# Patient Record
Sex: FEMALE | Race: OTHER | NOT HISPANIC OR LATINO | URBAN - METROPOLITAN AREA
[De-identification: names, ages, dates, MRNs, and addresses within clinical notes are randomized per-mention and may not be internally consistent; named-entity substitution may affect disease eponyms.]

---

## 2018-09-02 ENCOUNTER — EMERGENCY (EMERGENCY)
Facility: HOSPITAL | Age: 53
LOS: 1 days | Discharge: HOME | End: 2018-09-02
Attending: EMERGENCY MEDICINE
Payer: COMMERCIAL

## 2018-09-02 VITALS
OXYGEN SATURATION: 100 % | TEMPERATURE: 98 F | SYSTOLIC BLOOD PRESSURE: 169 MMHG | HEART RATE: 86 BPM | DIASTOLIC BLOOD PRESSURE: 90 MMHG | HEIGHT: 64 IN | WEIGHT: 147.05 LBS | RESPIRATION RATE: 16 BRPM

## 2018-09-02 VITALS
DIASTOLIC BLOOD PRESSURE: 71 MMHG | SYSTOLIC BLOOD PRESSURE: 154 MMHG | RESPIRATION RATE: 17 BRPM | HEART RATE: 81 BPM | OXYGEN SATURATION: 100 % | TEMPERATURE: 98 F

## 2018-09-02 PROCEDURE — 70450 CT HEAD/BRAIN W/O DYE: CPT

## 2018-09-02 PROCEDURE — 99284 EMERGENCY DEPT VISIT MOD MDM: CPT | Mod: 25

## 2018-09-02 PROCEDURE — 73620 X-RAY EXAM OF FOOT: CPT

## 2018-09-02 PROCEDURE — 70450 CT HEAD/BRAIN W/O DYE: CPT | Mod: 26

## 2018-09-02 PROCEDURE — 99284 EMERGENCY DEPT VISIT MOD MDM: CPT

## 2018-09-02 PROCEDURE — 73620 X-RAY EXAM OF FOOT: CPT | Mod: 26,LT

## 2018-09-02 PROCEDURE — 29515 APPLICATION SHORT LEG SPLINT: CPT | Mod: LT

## 2018-09-02 PROCEDURE — 73610 X-RAY EXAM OF ANKLE: CPT

## 2018-09-02 PROCEDURE — 73610 X-RAY EXAM OF ANKLE: CPT | Mod: 26,LT

## 2018-09-02 NOTE — CONSULT NOTE ADULT - SUBJECTIVE AND OBJECTIVE BOX
HPI: Patient is a 53 y/o F who presents to the ED c/o presenting to ED due to a slip and fall in bathtub x today. Pt states that she striked her head on bathtub and is experiencing pain at back of head.     PAST MEDICAL & SURGICAL HISTORY:  No pertinent past medical history  No significant past surgical history    Review of systems: Non Contributory    MEDICATIONS  (STANDING):    Allergies: No known Allergies    Physical Examination:    Musculoskeletal:         Neurovascularly Intact    RADIOLOGY & ADDITIONAL STUDIES:    ASSESSMENT:    PLAN/RECOMMENDATION:    FOLLOW UP: HPI: Patient is a 51 y/o F who presents to the ED c/o presenting to ED c/o left foot and ankle pain and swelling. Patient reports she slipped and fell in the bathtub today.    PAST MEDICAL & SURGICAL HISTORY:  No pertinent past medical history  No significant past surgical history    Review of systems: Non Contributory    MEDICATIONS  (STANDING):    Allergies: No known Allergies    Physical Examination:    Musculoskeletal:   Physical examination of left ankle/ foot shows pain to palpation on the dorsal side of the left foot and the lateral ankle ligament area. There is mild swelling, ecchymosis and decreased in range of motion noted.      Neurovascularly Intact    RADIOLOGY & ADDITIONAL STUDIES: X-ray of left foot and ankle done in the ER showed no acute fractures.     ASSESSMENT: Left foot/ankle sprain, s/p slip and fall in bath tub    PLAN/RECOMMENDATION:  Short leg splint/immobilizer was applied. Cane was given.     Apply ice. Take anti-inflammatory medication. Apply Voltaren gel.     FOLLOW UP: With office in 1-2 weeks

## 2018-09-02 NOTE — ED ADULT TRIAGE NOTE - CHIEF COMPLAINT QUOTE
Burning sensation on back of head ,pain side of l foot s/p slip and fall during shower ,denied loc ,denied being on any anti coagulant

## 2018-09-02 NOTE — ED ADULT NURSE NOTE - OBJECTIVE STATEMENT
States she slipped and fell while taking a shower , hit back of head against tub. Denies LOC, nausea or vomiting. c/o pain back of the head and left foot pain , with pulsating and tingling pain , breasts area.

## 2018-09-02 NOTE — ED PROVIDER NOTE - OBJECTIVE STATEMENT
51 y/o F with no significant PMHx or no significant PSHx presents to the ED c/o presenting to ED due to a slip and fall in bathtub x today. Pt states that she striked her head on bathtub and is experiencing pain at back of head. PT denies LOC or inability to ambulate after episode. NKDA

## 2018-09-02 NOTE — ED ADULT NURSE NOTE - NSIMPLEMENTINTERV_GEN_ALL_ED
Implemented All Universal Safety Interventions:  [place] to call system. Call bell, personal items and telephone within reach. Instruct patient to call for assistance. Room bathroom lighting operational. Non-slip footwear when patient is off stretcher. Physically safe environment: no spills, clutter or unnecessary equipment. Stretcher in lowest position, wheels locked, appropriate side rails in place.

## 2022-08-05 NOTE — ED ADULT NURSE NOTE - TEMPLATE LIST FOR HEAD TO TOE ASSESSMENT
Shahzad Naqvi     PREOPERATIVE DIAGNOSIS:   Cataract.   POSTOPERATIVE DIAGNOSIS:   Cataract.   PROCEDURE PERFORMED:   Cataract extraction with a posterior chamber intraocular lens, left eye.     SURGEON:   Dr. Steven Carney     ANESTHESIA:   Topical with IV sedation.     COMPLICATIONS:   None.     ESTIMATED BLOOD LOSS:  Less than 2ml  Shahzad Naqvi     TECHNIQUE:   Prior to surgery, the risks and benefits of the procedure including but not limited to infection, hemorrhage, retinal detachment and the need for a second surgery, loss of vision and blindness and loss of eye were discussed at length with the patient and informed consent was obtained.     The patient was then brought into the Operating Room and the patient was prepped and draped in sterile fashion, leaving only the operative eye exposed. Topical Tetracaine was instilled onto the eye for anesthesia.  A Rafa lid speculum was placed in the operative eye to provide exposure.  Lidocaine 4% was instilled on to the eye.  A Supersharp blade was used to create a paracentesis. Intracameral 2% lidocaine with 1/1000 epinephrine was injected into the eye to provide further anesthesia and dilation. Viscoelastic was injected through the paracentesis and filled the anterior chamber. A 2.4 mm Keratome was used to create a full-thickness clear corneal incision. A cystotome was then used to create a continuous capsulorhexis. Balanced salt solution on a hydrodissection cannula was used to gently hydrodissect the lens until the lens was noted to be freely mobile. Phacoemulsification was then used to remove the nucleus. Irrigating aspiration was used to remove the remaining cortex. Viscoelastic was then injected to fill the capsular bag and a posterior chamber lens model SN60WF with a power of 20.0 was inserted into the capsular bag and rotated in position using a intraocular lens hook. Irrigating and aspiration were used to remove the Viscoelastic. Stromal  hydration was used to close the wound. Moxifloxacin 0.2cc was injected into the eye. The wounds were then checked with Weck-Nayeli for leaks. They were found to be watertight. The speculum and drapes were removed. Prednisolone Acetate was instilled on the operative eye and the patient was taken to Recovery in good condition.      Steven Carney MD   8/5/2022     Fall

## 2023-04-06 ENCOUNTER — EMERGENCY (EMERGENCY)
Facility: HOSPITAL | Age: 58
LOS: 1 days | Discharge: HOME | End: 2023-04-06
Attending: EMERGENCY MEDICINE | Admitting: EMERGENCY MEDICINE
Payer: COMMERCIAL

## 2023-04-06 VITALS
TEMPERATURE: 98 F | RESPIRATION RATE: 16 BRPM | OXYGEN SATURATION: 98 % | DIASTOLIC BLOOD PRESSURE: 92 MMHG | WEIGHT: 135.58 LBS | HEIGHT: 64 IN | HEART RATE: 77 BPM | SYSTOLIC BLOOD PRESSURE: 164 MMHG

## 2023-04-06 VITALS
TEMPERATURE: 98 F | SYSTOLIC BLOOD PRESSURE: 157 MMHG | OXYGEN SATURATION: 97 % | RESPIRATION RATE: 16 BRPM | DIASTOLIC BLOOD PRESSURE: 86 MMHG | HEART RATE: 76 BPM

## 2023-04-06 LAB
ALBUMIN SERPL ELPH-MCNC: 3.7 G/DL — SIGNIFICANT CHANGE UP (ref 3.3–5)
ALP SERPL-CCNC: 132 U/L — HIGH (ref 30–120)
ALT FLD-CCNC: 21 U/L DA — SIGNIFICANT CHANGE UP (ref 10–60)
ANION GAP SERPL CALC-SCNC: 12 MMOL/L — SIGNIFICANT CHANGE UP (ref 5–17)
APTT BLD: 36.4 SEC — HIGH (ref 27.5–35.5)
AST SERPL-CCNC: 16 U/L — SIGNIFICANT CHANGE UP (ref 10–40)
BASOPHILS # BLD AUTO: 0.03 K/UL — SIGNIFICANT CHANGE UP (ref 0–0.2)
BASOPHILS NFR BLD AUTO: 0.3 % — SIGNIFICANT CHANGE UP (ref 0–2)
BILIRUB SERPL-MCNC: 0.7 MG/DL — SIGNIFICANT CHANGE UP (ref 0.2–1.2)
BUN SERPL-MCNC: 30 MG/DL — HIGH (ref 7–23)
CALCIUM SERPL-MCNC: 9 MG/DL — SIGNIFICANT CHANGE UP (ref 8.4–10.5)
CHLORIDE SERPL-SCNC: 102 MMOL/L — SIGNIFICANT CHANGE UP (ref 96–108)
CO2 SERPL-SCNC: 21 MMOL/L — LOW (ref 22–31)
CREAT SERPL-MCNC: 1.53 MG/DL — HIGH (ref 0.5–1.3)
EGFR: 39 ML/MIN/1.73M2 — LOW
EOSINOPHIL # BLD AUTO: 0.12 K/UL — SIGNIFICANT CHANGE UP (ref 0–0.5)
EOSINOPHIL NFR BLD AUTO: 1.1 % — SIGNIFICANT CHANGE UP (ref 0–6)
FLUAV AG NPH QL: SIGNIFICANT CHANGE UP
FLUBV AG NPH QL: SIGNIFICANT CHANGE UP
GLUCOSE SERPL-MCNC: 280 MG/DL — HIGH (ref 70–99)
HCT VFR BLD CALC: 36.3 % — SIGNIFICANT CHANGE UP (ref 34.5–45)
HGB BLD-MCNC: 10.9 G/DL — LOW (ref 11.5–15.5)
IMM GRANULOCYTES NFR BLD AUTO: 0.2 % — SIGNIFICANT CHANGE UP (ref 0–0.9)
INR BLD: 1.05 RATIO — SIGNIFICANT CHANGE UP (ref 0.88–1.16)
LYMPHOCYTES # BLD AUTO: 2.34 K/UL — SIGNIFICANT CHANGE UP (ref 1–3.3)
LYMPHOCYTES # BLD AUTO: 22.3 % — SIGNIFICANT CHANGE UP (ref 13–44)
MCHC RBC-ENTMCNC: 17.7 PG — LOW (ref 27–34)
MCHC RBC-ENTMCNC: 30 GM/DL — LOW (ref 32–36)
MCV RBC AUTO: 59 FL — LOW (ref 80–100)
MONOCYTES # BLD AUTO: 0.47 K/UL — SIGNIFICANT CHANGE UP (ref 0–0.9)
MONOCYTES NFR BLD AUTO: 4.5 % — SIGNIFICANT CHANGE UP (ref 2–14)
NEUTROPHILS # BLD AUTO: 7.49 K/UL — HIGH (ref 1.8–7.4)
NEUTROPHILS NFR BLD AUTO: 71.6 % — SIGNIFICANT CHANGE UP (ref 43–77)
NRBC # BLD: 0 /100 WBCS — SIGNIFICANT CHANGE UP (ref 0–0)
PLATELET # BLD AUTO: 302 K/UL — SIGNIFICANT CHANGE UP (ref 150–400)
POTASSIUM SERPL-MCNC: 4.3 MMOL/L — SIGNIFICANT CHANGE UP (ref 3.5–5.3)
POTASSIUM SERPL-SCNC: 4.3 MMOL/L — SIGNIFICANT CHANGE UP (ref 3.5–5.3)
PROT SERPL-MCNC: 7.8 G/DL — SIGNIFICANT CHANGE UP (ref 6–8.3)
PROTHROM AB SERPL-ACNC: 12.4 SEC — SIGNIFICANT CHANGE UP (ref 10.5–13.4)
RBC # BLD: 6.15 M/UL — HIGH (ref 3.8–5.2)
RBC # FLD: 19.1 % — HIGH (ref 10.3–14.5)
RSV RNA NPH QL NAA+NON-PROBE: SIGNIFICANT CHANGE UP
SARS-COV-2 RNA SPEC QL NAA+PROBE: SIGNIFICANT CHANGE UP
SODIUM SERPL-SCNC: 135 MMOL/L — SIGNIFICANT CHANGE UP (ref 135–145)
TROPONIN I, HIGH SENSITIVITY RESULT: <4 NG/L — SIGNIFICANT CHANGE UP
WBC # BLD: 10.47 K/UL — SIGNIFICANT CHANGE UP (ref 3.8–10.5)
WBC # FLD AUTO: 10.47 K/UL — SIGNIFICANT CHANGE UP (ref 3.8–10.5)

## 2023-04-06 PROCEDURE — 99285 EMERGENCY DEPT VISIT HI MDM: CPT | Mod: 25

## 2023-04-06 PROCEDURE — 84443 ASSAY THYROID STIM HORMONE: CPT

## 2023-04-06 PROCEDURE — 93010 ELECTROCARDIOGRAM REPORT: CPT

## 2023-04-06 PROCEDURE — 96360 HYDRATION IV INFUSION INIT: CPT | Mod: XU

## 2023-04-06 PROCEDURE — 70498 CT ANGIOGRAPHY NECK: CPT | Mod: MA

## 2023-04-06 PROCEDURE — 87637 SARSCOV2&INF A&B&RSV AMP PRB: CPT

## 2023-04-06 PROCEDURE — 71045 X-RAY EXAM CHEST 1 VIEW: CPT

## 2023-04-06 PROCEDURE — 84484 ASSAY OF TROPONIN QUANT: CPT

## 2023-04-06 PROCEDURE — 70496 CT ANGIOGRAPHY HEAD: CPT | Mod: 26,MA

## 2023-04-06 PROCEDURE — 85730 THROMBOPLASTIN TIME PARTIAL: CPT

## 2023-04-06 PROCEDURE — 80053 COMPREHEN METABOLIC PANEL: CPT

## 2023-04-06 PROCEDURE — 85025 COMPLETE CBC W/AUTO DIFF WBC: CPT

## 2023-04-06 PROCEDURE — 36415 COLL VENOUS BLD VENIPUNCTURE: CPT

## 2023-04-06 PROCEDURE — 70496 CT ANGIOGRAPHY HEAD: CPT | Mod: MA

## 2023-04-06 PROCEDURE — 70498 CT ANGIOGRAPHY NECK: CPT | Mod: 26,MA

## 2023-04-06 PROCEDURE — 99285 EMERGENCY DEPT VISIT HI MDM: CPT

## 2023-04-06 PROCEDURE — 71045 X-RAY EXAM CHEST 1 VIEW: CPT | Mod: 26

## 2023-04-06 PROCEDURE — 70450 CT HEAD/BRAIN W/O DYE: CPT | Mod: MA

## 2023-04-06 PROCEDURE — 85610 PROTHROMBIN TIME: CPT

## 2023-04-06 PROCEDURE — 93005 ELECTROCARDIOGRAM TRACING: CPT

## 2023-04-06 RX ORDER — SODIUM CHLORIDE 9 MG/ML
1000 INJECTION INTRAMUSCULAR; INTRAVENOUS; SUBCUTANEOUS ONCE
Refills: 0 | Status: COMPLETED | OUTPATIENT
Start: 2023-04-06 | End: 2023-04-06

## 2023-04-06 RX ADMIN — SODIUM CHLORIDE 1000 MILLILITER(S): 9 INJECTION INTRAMUSCULAR; INTRAVENOUS; SUBCUTANEOUS at 16:30

## 2023-04-06 NOTE — ED PROVIDER NOTE - ENMT, MLM
Airway patent. Mouth with normal mucosa. Throat has no vesicles, no oropharyngeal exudates and uvula is midline. No neck swelling noted, no drooling/stridor/hoarseness noted. No meningismus. No scalp tenderness/erythema/edema noted.

## 2023-04-06 NOTE — ED PROVIDER NOTE - PROGRESS NOTE DETAILS
Reevaluated patient at bedside.  Patient feeling much improved.  Discussed the results of all diagnostic testing in ED and copies of all reports given. Advised to stay hydrated, f/u with cardiologist and neurologist.  An opportunity to ask questions was given.  Discussed the importance of prompt, close medical follow-up.  Patient will return with any changes, concerns or persistent / worsening symptoms.  Understanding of all instructions verbalized. Pt seen by neuro, Dr. Manzo in ED, advised symptoms likely from near syncope, can f/u with her cardiologist and also f/u with Dr. Diaz for neuro.

## 2023-04-06 NOTE — ED PROVIDER NOTE - NSFOLLOWUPINSTRUCTIONS_ED_ALL_ED_FT
Follow-up with neurologist and your cardiologist for reevaluation, ongoing care and treatment.  Rest, stay hydrated.  If having worsening symptoms or other related symptoms, return to the ER immediately    Near-Syncope  Outline of the head showing blood vessels that supply the brain.   Near-syncope is when you suddenly feel like you might pass out or faint. This may also be called presyncope. During an episode of near-syncope, you may:  Feel dizzy, weak, or light-headed. It may feel like the room is spinning.  Feel like you may vomit (nauseous).  See spots or see all white or all black.  Have cold, clammy skin.  Feel warm and sweaty.  Hear ringing in your ears.  This condition is caused by a sudden decrease in blood flow to the brain. This can result from many causes, but most of those causes are not dangerous. However, near-syncope may be a sign of a serious medical problem, so it is important to seek medical care.    Follow these instructions at home:  Medicines    Take over-the-counter and prescription medicines only as told by your doctor.  If you are taking blood pressure or heart medicine, get up slowly and spend many minutes getting ready to sit and then stand. This can help with dizziness.  Lifestyle    Do not drive, use machinery, or play sports until your doctor says it is okay.  Do not drink alcohol.  Do not smoke or use any products that contain nicotine or tobacco. If you need help quitting, ask your doctor.  Avoid hot tubs and saunas.  General instructions    Be aware of any changes in your symptoms.  Talk with your doctor about your symptoms. You may need to have testing to help find the cause.  If you start to feel like you might pass out, sit or lie down right away. If sitting, lower your head down between your legs. If lying down, raise (elevate) your feet above the level of your heart.  Breathe deeply and steadily. Wait until all of the symptoms are gone.  Have someone stay with you until you feel better.  Drink enough fluid to keep your pee (urine) pale yellow.  Avoid standing for a long time. If you must stand for a long time, do movements such as:  Moving your legs.  Crossing your legs.  Flexing and stretching your leg muscles.  Squatting.  Keep all follow-up visits.  Contact a doctor if:  You continue to have episodes of near fainting.  Get help right away if:  You pass out or faint.  You have any of these symptoms:  Fast or uneven heartbeats (palpitations).  Pain in your chest, belly, or back.  Shortness of breath.  You have a seizure.  You have a very bad headache.  You are confused.  You have trouble seeing.  You are very weak.  You have trouble walking.  You are bleeding from your mouth or butt.  You have black or tarry poop (stool).  These symptoms may be an emergency. Get help right away. Call your local emergency services (911 in the [place]  Do not wait to see if the symptoms will go away.  Do not drive yourself to the hospital.  Summary  Near-syncope is when you suddenly feel like you might pass out or faint.  This condition is caused by a sudden decrease in blood flow to the brain.  Near-syncope may be a sign of a serious medical problem, so it is important to seek medical care.  If you start to feel like you might pass out, sit or lie down right away. If sitting, lower your head down between your legs. If lying down, raise (elevate) your feet above the level of your heart.  Talk with your doctor about your symptoms. You may need to have testing to help find the cause.  This information is not intended to replace advice given to you by your health care provider. Make sure you discuss any questions you have with your health care provider.

## 2023-04-06 NOTE — ED PROVIDER NOTE - CARE PROVIDER_API CALL
Michelle Diaz  NEUROLOGY  [address], [address]  [place] [address]  Phone: [phone]  Fax: [phone]  Follow Up Time: 1-3 Days    YOUR CARDIOLOGIST,   Phone: (   )    -  Fax: (   )    -  Follow Up Time: 1-3 Days

## 2023-04-06 NOTE — ED PROVIDER NOTE - PATIENT PORTAL LINK FT
You can access the FollowMyHealth Patient Portal offered by [facility] by registering at the following website: [email or web address]. By joining [employer]’s FollowMyHealth portal, you will also be able to view your health information using other applications (apps) compatible with our system.

## 2023-04-06 NOTE — ED PROVIDER NOTE - CLINICAL SUMMARY MEDICAL DECISION MAKING FREE TEXT BOX
57-year-old female with no significant past medical history presents with has been having some occipital headache/upper neck pain over past 2 days.  Patient also with some feeling like she has some discomfort or difficulty with swallowing.  Patient is able to tolerate p.o. intake, just with some discomfort.  No acute chest pain or shortness of breath.  No discomfort in the chest, only in the neck.  No numbness/tingling/focal weakness.  No radiation of pain to the arms or legs.  No fever or chills.  No recent head injury or LOC.  No visual changes.  No cough/URI.  No known COVID exposures.  No recent COVID infections.  Patient previously vaccinated for COVID.  No aggravating or alleviating factors otherwise noted.  No other acute injury or complaints.  Exam: Nontoxic, well-appearing.  Neck supple.  No meningeal signs.  CTA BL, no W/R/R.  Normal nonfocal detailed neurologic exam.  No pharyngeal edema.  No stridor.  Normal respiratory effort.  No other acute findings on exam.  No tenderness to the scalp, no erythema or edema.  Atypical headache and neck pain with some anterior neck discomfort as well.  Will check labs, CTA head and neck, reeval

## 2023-04-06 NOTE — ED PROVIDER NOTE - NS ED ATTENDING STATEMENT MOD
This was a shared visit with the BRANDAN. I reviewed and verified the documentation and independently performed the documented:

## 2023-04-06 NOTE — ED ADULT NURSE NOTE - OBJECTIVE STATEMENT
Pt came in ambulatory complains of feeling discomfort or feels unable to swallow since this past Monday. Pt denies any vomiting. Pt able to swallow but have the feeling of " something in my throat " when she drink or eat. No SOB  Airway patent No stridor Clear bilateral breath sounds

## 2023-04-06 NOTE — ED PROVIDER NOTE - OBJECTIVE STATEMENT
57-year-old female with history of DM, hypertension, asthma presents with c/o headache and neck pain today.  Patient states that she has had pressure-like occipital headache and upper neck pressure/pain since 11 AM this morning.  States that she also has had associated lightheadedness and generalized weakness.  Patient states that she has had anterior neck discomfort/difficulty only with swallowing over the past 3 to 4 days and occasionally has sensation of near syncope when swallowing.  Patient states that she is able to tolerate p.o. intake however just with some discomfort. States that she has appointment with a cardiologist next week. Denies chest pain, shortness of breath, vision changes, numbness, tingling, focal weakness, slurred speech, trauma/fall, fever, chills, rash, photophobia, neck stifness, sore throat, recent URI.

## 2023-04-06 NOTE — ED PROVIDER NOTE - PROVIDER TOKENS
PROVIDER:[TOKEN:[[id number]:[id number]],FOLLOWUP:[1-3 Days]],FREE:[LAST:[YOUR CARDIOLOGIST],PHONE:[(   )    -],FAX:[(   )    -],FOLLOWUP:[1-3 Days]]

## 2023-04-06 NOTE — ED ADULT TRIAGE NOTE - CHIEF COMPLAINT QUOTE
feels like cant swallow since monday ate oatmeal today and tolerated. c/o occipital headache and neck and back pain today. denies palpitations, heat or cold intolerance or lumps to neck. denies thyroid problems

## 2023-04-07 LAB — TSH SERPL-MCNC: 2.46 UIU/ML — SIGNIFICANT CHANGE UP (ref 0.27–4.2)

## 2023-04-08 ENCOUNTER — EMERGENCY (EMERGENCY)
Facility: HOSPITAL | Age: 58
LOS: 1 days | Discharge: HOME | End: 2023-04-08
Attending: STUDENT IN AN ORGANIZED HEALTH CARE EDUCATION/TRAINING PROGRAM
Payer: COMMERCIAL

## 2023-04-08 VITALS
WEIGHT: 134.92 LBS | HEART RATE: 75 BPM | OXYGEN SATURATION: 99 % | SYSTOLIC BLOOD PRESSURE: 174 MMHG | HEIGHT: 64 IN | RESPIRATION RATE: 18 BRPM | TEMPERATURE: 98 F | DIASTOLIC BLOOD PRESSURE: 96 MMHG

## 2023-04-08 VITALS
SYSTOLIC BLOOD PRESSURE: 176 MMHG | RESPIRATION RATE: 18 BRPM | TEMPERATURE: 98 F | DIASTOLIC BLOOD PRESSURE: 90 MMHG | HEART RATE: 80 BPM | OXYGEN SATURATION: 98 %

## 2023-04-08 LAB
ALBUMIN SERPL ELPH-MCNC: 3.4 G/DL — LOW (ref 3.5–5)
ALP SERPL-CCNC: 115 U/L — SIGNIFICANT CHANGE UP (ref 40–120)
ALT FLD-CCNC: 21 U/L DA — SIGNIFICANT CHANGE UP (ref 10–60)
ANION GAP SERPL CALC-SCNC: 9 MMOL/L — SIGNIFICANT CHANGE UP (ref 5–17)
APPEARANCE UR: CLEAR — SIGNIFICANT CHANGE UP
APTT BLD: 37.8 SEC — HIGH (ref 27.5–35.5)
AST SERPL-CCNC: 19 U/L — SIGNIFICANT CHANGE UP (ref 10–40)
BACTERIA # UR AUTO: ABNORMAL /HPF
BASOPHILS # BLD AUTO: 0.04 K/UL — SIGNIFICANT CHANGE UP (ref 0–0.2)
BASOPHILS NFR BLD AUTO: 0.5 % — SIGNIFICANT CHANGE UP (ref 0–2)
BILIRUB SERPL-MCNC: 0.8 MG/DL — SIGNIFICANT CHANGE UP (ref 0.2–1.2)
BILIRUB UR-MCNC: NEGATIVE — SIGNIFICANT CHANGE UP
BUN SERPL-MCNC: 17 MG/DL — SIGNIFICANT CHANGE UP (ref 7–18)
CALCIUM SERPL-MCNC: 9.3 MG/DL — SIGNIFICANT CHANGE UP (ref 8.4–10.5)
CHLORIDE SERPL-SCNC: 114 MMOL/L — HIGH (ref 96–108)
CO2 SERPL-SCNC: 22 MMOL/L — SIGNIFICANT CHANGE UP (ref 22–31)
COLOR SPEC: YELLOW — SIGNIFICANT CHANGE UP
COMMENT - URINE: SIGNIFICANT CHANGE UP
CREAT SERPL-MCNC: 1.11 MG/DL — SIGNIFICANT CHANGE UP (ref 0.5–1.3)
DIFF PNL FLD: NEGATIVE — SIGNIFICANT CHANGE UP
EGFR: 58 ML/MIN/1.73M2 — LOW
EOSINOPHIL # BLD AUTO: 0.06 K/UL — SIGNIFICANT CHANGE UP (ref 0–0.5)
EOSINOPHIL NFR BLD AUTO: 0.7 % — SIGNIFICANT CHANGE UP (ref 0–6)
EPI CELLS # UR: ABNORMAL /HPF
GLUCOSE SERPL-MCNC: 127 MG/DL — HIGH (ref 70–99)
GLUCOSE UR QL: 1000 MG/DL
HCT VFR BLD CALC: 38.8 % — SIGNIFICANT CHANGE UP (ref 34.5–45)
HGB BLD-MCNC: 11.2 G/DL — LOW (ref 11.5–15.5)
IMM GRANULOCYTES NFR BLD AUTO: 0.2 % — SIGNIFICANT CHANGE UP (ref 0–0.9)
INR BLD: 1.11 RATIO — SIGNIFICANT CHANGE UP (ref 0.88–1.16)
KETONES UR-MCNC: ABNORMAL
LEUKOCYTE ESTERASE UR-ACNC: NEGATIVE — SIGNIFICANT CHANGE UP
LYMPHOCYTES # BLD AUTO: 1.62 K/UL — SIGNIFICANT CHANGE UP (ref 1–3.3)
LYMPHOCYTES # BLD AUTO: 18.5 % — SIGNIFICANT CHANGE UP (ref 13–44)
MAGNESIUM SERPL-MCNC: 2.2 MG/DL — SIGNIFICANT CHANGE UP (ref 1.6–2.6)
MCHC RBC-ENTMCNC: 17.3 PG — LOW (ref 27–34)
MCHC RBC-ENTMCNC: 28.9 GM/DL — LOW (ref 32–36)
MCV RBC AUTO: 60.1 FL — LOW (ref 80–100)
MONOCYTES # BLD AUTO: 0.44 K/UL — SIGNIFICANT CHANGE UP (ref 0–0.9)
MONOCYTES NFR BLD AUTO: 5 % — SIGNIFICANT CHANGE UP (ref 2–14)
NEUTROPHILS # BLD AUTO: 6.58 K/UL — SIGNIFICANT CHANGE UP (ref 1.8–7.4)
NEUTROPHILS NFR BLD AUTO: 75.1 % — SIGNIFICANT CHANGE UP (ref 43–77)
NITRITE UR-MCNC: NEGATIVE — SIGNIFICANT CHANGE UP
NRBC # BLD: 0 /100 WBCS — SIGNIFICANT CHANGE UP (ref 0–0)
PH UR: 5 — SIGNIFICANT CHANGE UP (ref 5–8)
PHOSPHATE SERPL-MCNC: 4.3 MG/DL — SIGNIFICANT CHANGE UP (ref 2.5–4.5)
PLATELET # BLD AUTO: 277 K/UL — SIGNIFICANT CHANGE UP (ref 150–400)
POTASSIUM SERPL-MCNC: 4.4 MMOL/L — SIGNIFICANT CHANGE UP (ref 3.5–5.3)
POTASSIUM SERPL-SCNC: 4.4 MMOL/L — SIGNIFICANT CHANGE UP (ref 3.5–5.3)
PROT SERPL-MCNC: 7.4 G/DL — SIGNIFICANT CHANGE UP (ref 6–8.3)
PROT UR-MCNC: 30 MG/DL
PROTHROM AB SERPL-ACNC: 13.2 SEC — SIGNIFICANT CHANGE UP (ref 10.5–13.4)
RBC # BLD: 6.46 M/UL — HIGH (ref 3.8–5.2)
RBC # FLD: 19.4 % — HIGH (ref 10.3–14.5)
RBC CASTS # UR COMP ASSIST: SIGNIFICANT CHANGE UP /HPF (ref 0–2)
SARS-COV-2 RNA SPEC QL NAA+PROBE: SIGNIFICANT CHANGE UP
SODIUM SERPL-SCNC: 145 MMOL/L — SIGNIFICANT CHANGE UP (ref 135–145)
SP GR SPEC: 1.01 — SIGNIFICANT CHANGE UP (ref 1.01–1.02)
TROPONIN I, HIGH SENSITIVITY RESULT: 4.6 NG/L — SIGNIFICANT CHANGE UP
UROBILINOGEN FLD QL: NEGATIVE — SIGNIFICANT CHANGE UP
WBC # BLD: 8.76 K/UL — SIGNIFICANT CHANGE UP (ref 3.8–10.5)
WBC # FLD AUTO: 8.76 K/UL — SIGNIFICANT CHANGE UP (ref 3.8–10.5)
WBC UR QL: SIGNIFICANT CHANGE UP /HPF (ref 0–5)

## 2023-04-08 PROCEDURE — 71275 CT ANGIOGRAPHY CHEST: CPT | Mod: 26,MA

## 2023-04-08 PROCEDURE — 87086 URINE CULTURE/COLONY COUNT: CPT

## 2023-04-08 PROCEDURE — 81001 URINALYSIS AUTO W/SCOPE: CPT

## 2023-04-08 PROCEDURE — 85730 THROMBOPLASTIN TIME PARTIAL: CPT

## 2023-04-08 PROCEDURE — 71275 CT ANGIOGRAPHY CHEST: CPT | Mod: MA

## 2023-04-08 PROCEDURE — 83735 ASSAY OF MAGNESIUM: CPT

## 2023-04-08 PROCEDURE — 84100 ASSAY OF PHOSPHORUS: CPT

## 2023-04-08 PROCEDURE — 85025 COMPLETE CBC W/AUTO DIFF WBC: CPT

## 2023-04-08 PROCEDURE — 85610 PROTHROMBIN TIME: CPT

## 2023-04-08 PROCEDURE — 84484 ASSAY OF TROPONIN QUANT: CPT

## 2023-04-08 PROCEDURE — 36415 COLL VENOUS BLD VENIPUNCTURE: CPT

## 2023-04-08 PROCEDURE — 99285 EMERGENCY DEPT VISIT HI MDM: CPT

## 2023-04-08 PROCEDURE — 99285 EMERGENCY DEPT VISIT HI MDM: CPT | Mod: 25

## 2023-04-08 PROCEDURE — 87635 SARS-COV-2 COVID-19 AMP PRB: CPT

## 2023-04-08 PROCEDURE — 80053 COMPREHEN METABOLIC PANEL: CPT

## 2023-04-08 PROCEDURE — 93005 ELECTROCARDIOGRAM TRACING: CPT

## 2023-04-08 PROCEDURE — 82962 GLUCOSE BLOOD TEST: CPT

## 2023-04-08 RX ORDER — SODIUM CHLORIDE 9 MG/ML
1000 INJECTION INTRAMUSCULAR; INTRAVENOUS; SUBCUTANEOUS ONCE
Refills: 0 | Status: COMPLETED | OUTPATIENT
Start: 2023-04-08 | End: 2023-04-08

## 2023-04-08 RX ADMIN — SODIUM CHLORIDE 1000 MILLILITER(S): 9 INJECTION INTRAMUSCULAR; INTRAVENOUS; SUBCUTANEOUS at 13:36

## 2023-04-08 NOTE — ED PROVIDER NOTE - PROGRESS NOTE DETAILS
Camille-DO: pt seen and re-evaluated at bedside.  Pt states their symptoms have improved, reports persistent discomfort when swallowing.  Pt comfortable in NAD.  Labs/imaging non-actionable, tolerating PO. Will DC with GI and PMD follow up, return precautions discussed, pt understood and agreeable with plan.

## 2023-04-08 NOTE — ED PROVIDER NOTE - PATIENT PORTAL LINK FT
You can access the FollowMyHealth Patient Portal offered by [facility] by registering at the following website: [email or web address]. By joining [employer]’s FollowMyHealth portal, you will also be able to view your health information using other applications (apps) compatible with our system.

## 2023-04-08 NOTE — ED ADULT TRIAGE NOTE - BEFAST FACE
ED Oral Problem





- General


Chief Complaint: Toothache


Stated Complaint: TOOTH PAIN


Time Seen by Provider: 01/04/19 15:42


Mode of Arrival: Ambulatory


Information source: Patient


Notes: 





37-year-old female presented to ED for dental pain to tooth #28.  There is a 

large cavity to this tooth.  She states that started yesterday but that she has 

had part of the tooth missing for a while.  She states that she took ibuprofen 

with no relief.  She is alert oriented respirations regular and unlabored s

peaking in full sentences walking with a even steady gait.


TRAVEL OUTSIDE OF THE [place] IN LAST 30 DAYS: No





- HPI


Patient complains to provider of: Toothache


Onset: Yesterday


Onset: Gradual


Quality of pain: Sharp, Throbbing


Severity: Severe


Pain Level: 5


Associated symptoms: Toothache


Worsened by: Other - Smoking


Relieved by: Nothing


Similar symptoms previously: Yes


Recently seen / treated by doctor/dentist: No





- Related Data


Allergies/Adverse Reactions: 


                                        





No Known Allergies Allergy (Verified 01/04/19 15:21)


   











Past Medical History





- General


Information source: Patient





- Social History


Smoking Status: Current Every Day Smoker


Cigarette use (# per day): Yes - 1/2 pack/day


Smoking Education Provided: Yes - 4 minutes


Frequency of alcohol use: None


Drug Abuse: None


Occupation: [employer]


Lives with: Spouse/Significant other - And child


Family History: Reviewed & Not Pertinent


Patient has suicidal ideation: No


Patient has homicidal ideation: No





- Past Medical History


Cardiac Medical History: Reports: None


Pulmonary Medical History: Reports: None


EENT Medical History: Reports: None


Neurological Medical History: Reports: None


Endocrine Medical History: Reports: None


Renal/ Medical History: Reports: None


Malignancy Medical History: Reports: None


GI Medical History: Reports: None


Musculoskeletal Medical History: Reports Hx Musculoskeletal Trauma - Strain 

ligaments knee


Skin Medical History: Reports None


Psychiatric Medical History: Reports: None


Traumatic Medical History: Reports: None


Infectious Medical History: Reports: None


Past Surgical History: Reports: Hx Orthopedic Surgery - Left knee





Review of Systems





- Review of Systems


Constitutional: No symptoms reported


EENT: Mouth pain, Dental problem


Cardiovascular: No symptoms reported


Respiratory: No symptoms reported


Gastrointestinal: No symptoms reported


Genitourinary: No symptoms reported


Female Genitourinary: No symptoms reported


Musculoskeletal: No symptoms reported


Skin: No symptoms reported


Hematologic/Lymphatic: No symptoms reported


Neurological/Psychological: No symptoms reported





Physical Exam





- Vital signs


Vitals: 





                                        











Temp Pulse Resp BP Pulse Ox


 


 98.5 F   107 H  16   124/81   99 


 


 01/04/19 15:23  01/04/19 15:23  01/04/19 15:23  01/04/19 15:23  01/04/19 15:23











Interpretation: Normal





- General


General appearance: Appears well, Alert





- HEENT


Head: Normocephalic, Atraumatic


Eyes: Normal


Pupils: PERRL


Ears: Normal


External canal: Normal


Tympanic membrane: Normal


Sinus: Normal


Nasal: Normal


Mouth/Lips: Caries


Teeth diagram: 


                            __________________________














                            __________________________





 1 - Most of tooth missing with large cavities swelling around the tooth no 

abscess





Pharynx: Normal


Neck: Anterior cervical chain





- Respiratory


Respiratory status: No respiratory distress


Chest status: Nontender


Breath sounds: Normal


Chest palpation: Normal





- Cardiovascular


Rhythm: Regular


Heart sounds: Normal auscultation


Murmur: No





- Abdominal


Inspection: Normal


Distension: No distension


Bowel sounds: Normal


Tenderness: Nontender


Organomegaly: No organomegaly





- Back


Back: Normal, Nontender





- Extremities


General upper extremity: Normal inspection, Nontender, Normal color, Normal ROM,

Normal temperature


General lower extremity: Normal inspection, Nontender, Normal color, Normal ROM,

Normal temperature, Normal weight bearing.  No: Oral's sign





- Neurological


Neuro grossly intact: Yes


Cognition: Normal


Orientation: AAOx4


[place] Coma Scale Eye Opening: Spontaneous


[place] Coma Scale Verbal: Oriented


Elizabeth Coma Scale Motor: Obeys Commands


[place] Coma Scale Total: 15


Speech: Normal


Motor strength normal: LUE, RUE, LLE, RLE


Sensory: Normal





- Psychological


Associated symptoms: Normal affect, Normal mood





- Skin


Skin Temperature: Warm


Skin Moisture: Dry


Skin Color: Normal





Course





- Re-evaluation


Re-evalutation: 





01/04/19 16:10


Patient was treated with Bentyl and penicillin VK and viscous lidocaine in the 

emergency room and discharged home with prescription for penicillin VK and 3 

Norco for her dental pain.  Presentation is most consistent with likely an 

infected tooth.  Airway is patent.  Vitals within normal limits.  Patient is 

able swallow without any difficulty.  There is no significant facial swelling.  

No evidence of Ozzie angina, apical abscess, or airway obstruction.  Patient 

will be started on antibiotics.  I've instructed to follow-up with dentistry as 

earliest ability for definitive management.  At this time will discharge with 

return precautions and follow-up recommendations.  Verbal discharge instructions

given a the bedside and opportunity for questions given. Medication warnings 

reviewed. Patient is in agreement with this plan and has verbalized 

understanding of return precautions and the need for primary care follow-up in 

the next 24-72 hours.





- Vital Signs


Vital signs: 





                                        











Temp Pulse Resp BP Pulse Ox


 


 98.5 F   107 H  16   124/81   99 


 


 01/04/19 15:23  01/04/19 15:23  01/04/19 15:23  01/04/19 15:23  01/04/19 15:23














Discharge





- Discharge


Clinical Impression: 


 Pain due to dental caries





Condition: Stable


Disposition: HOME, SELF-CARE


Instructions:  [facility], Family Physicians / Practices


Additional Instructions: 


TOOTHACHE:





     Your pain is due to dental decay.  The tooth must be repaired in order for 

you to feel better.  You will, therefore, be referred to a dentist.  We do not 

have dentists on the staff at .


     Severe swelling or drainage around a tooth usually means a dental abscess. 

This also requires evaluation and treatment by the dentist, but antibiotics may 

be prescribed while awaiting dental treatment.


     You should be rechecked immediately if you develop major swelling of the 

face, increasing pain, a lump in the jaw or gums, headache, difficulty 

swallowing, or fever.








ORAL NARCOTIC MEDICATION:


     You have been given a prescription for pain control.  This medication is a 

narcotic.  It's best taken with food, as nausea can result if taken on an empty 

stomach.


     Don't operate machinery or drive within six hours of taking this medi

cation.  Do not combine this medicine with alcohol, or with any medication which

can cause sedation (such as cold tablets or sleeping pills) unless you get 

permission from the physician.


     Narcotics tend to cause constipation.  If possible, drink plenty of fluids 

and eat a diet high in fiber and fruits.





     Please be aware that prescription narcotics also have the potential for 

abuse.  People become addicted to these medications because of the general sense

of wellbeing that they induce.  This feeling along with a significant reduction 

in tension, anxiety, and aggression provides a stimulating seductive quality to 

these drugs.  Once your pain is under control, we encourage you to discard your 

unused narcotics.








PENICILLIN V K:


     You have been given a prescription for Penicillin VK.  Your physician has 

determined that this is the best antibiotic for your condition.


     Pen VK can be taken with meals, however more of the antibiotic gets into 

the bloodstream if it's taken on an empty stomach.


     Penicillin usually has no side effects.  However, allergy to penicillins is

common.  If you have had an allergic reaction to any drug of the penicillin 

family, you should never take any other penicillin.  Notify your doctor at once 

if you develop hives, itching, swelling, faintness, or shortness of breath.





You have been given a syringe of viscous lidocaine.  Please place a small amount

of this on your finger and then rub it on the tooth and gums of the tooth it is 

paining you.  You can do this every 3-4 hours.  More often than that is not 

going to help you.





FOLLOW-UP CARE:


     You have been referred for follow-up care to the dentists listed below.  

Call the dentists office for an appointment as you were instructed or within 

the next two days.  If you experience worsening or a significant change in your 

symptoms, notify the physician immediately or return to the Emergency Department

at any time for re-evaluation.





[facility]


[address] [place], [place]


[phone]





[facility]


[address] [place] [address] [phone]





[facility]


[address]


[phone]





[facility]


[address]


[facility]


[phone]





[facility]


[address]


[phone]


[email or web address]





[facility]


[address] [place]  [address] [phone]


Monday-Thursdays  8:00am to 5:00 pm


Will see patients from other [facility].


Charges based on income and family size and


accepts Medicare, Medicaid, and Insurances


Will pull molars





[facility]


Student Clinics


[facility] [place] [address] [phone]


Hours of Operation


   8:00 am - 4:30 pm weekdays





The following dental offices accept Medicaid:





   [facility]   [phone]


   Dr. Barker         [phone]


   Dr. Kaur         [phone]


   Dr. Sheppard         [phone]


   Dr. Bazan         [phone]


   [facility]


      oral surgery      [phone]


   Dr. Maya ([place])      [phone]


   Dr. Johnson ([place])   [phone]


   [facility]      [phone]


   [facility] ([place])   [phone]


   Dr. Cruz ([place])      [phone]


   [facility]      [phone]


   [facility]   [phone]


   [facility]   [phone]


   Dr. Glass ([place])      [phone]


   [facility] and 


      ([place])      [phone]





   Medicaid Care Line      [phone]


Prescriptions: 


Hydrocodone/Acetaminophen [Norco 5-325 mg Tablet] 1 tab PO Q6HP PRN #3 tablet


 PRN Reason: 


Penicillin V Potassium [Penicillin Vk 500 mg Tablet] 500 mg PO BID #20 tablet


Forms:  Smoking Cessation Education, Return to Work


Referrals: 


LOCALMD,NO [Primary Care Provider] - Follow up as needed No

## 2023-04-08 NOTE — ED PROVIDER NOTE - PHYSICAL EXAMINATION
CONSTITUTIONAL: non-toxic, well appearing  SKIN: no rash, no petechiae.  EYES: PERRL, EOMI, pink conjunctiva, anicteric  ENT: tongue and uvular midline, no exudates, mildly dry mucous membranes  NECK: Supple; no meningismus, no JVD  CARD: RRR, no murmurs, equal radial pulses bilaterally 2+  RESP: CTAB, no respiratory distress  ABD: Soft, non-tender, non-distended, no peritoneal signs, no CVA tenderness  EXT: Normal ROM x4. No edema. No calf tenderness  NEURO: Alert, oriented. Neuro exam nonfocal, equal strength bilaterally  PSYCH: Cooperative, appropriate.

## 2023-04-08 NOTE — ED PROVIDER NOTE - CLINICAL SUMMARY MEDICAL DECISION MAKING FREE TEXT BOX
Sowmya: 57-year-old female with past medical history hypertension, diabetes, mild intermittent asthma presents with chest discomfort x5 days.  Patient reports chest discomfort located in the middle of her chest, worse when trying to swallow, reports lightheadedness when attempting to swallow.  Patient seen 2 days ago at [facility] for the same complaint with negative CTA of her head and neck.  Patient reports occasional chills, but denies any fevers, headache, vision change, shortness of breath, abdominal pain, vomiting, diarrhea, numbness, weakness, or rash.  Patient tolerating oral intake.  Denies any recent chest discomfort with exertion.  Denies any possibility of foreign body. No evidence of a cardiac arrythmia such as Brugada, WPW, HOCM, long or short QT.  Neurologic exam is nonfocal, not c/w CVA or primary neurologic abnormality, NIHSS 0. Will obtain labs, imaging, provide supportive treatment with dispo pending workup.

## 2023-04-08 NOTE — ED PROVIDER NOTE - NSFOLLOWUPINSTRUCTIONS_ED_ALL_ED_FT
Rest and stay well hydrated.     Take over the counter acetaminophen (Tylenol) 650-1000 mg every 4-6 hours as needed for pain. Do not take more than 3000 mg in a 24 hour period. Be aware many over the counter and prescription medications also contain acetaminophen (Tylenol).     Follow up with gastroenterology as discussed (info provided).     Follow up with your primary care physician in 1-3 days.     Return with any new or worsening symptoms or concerns (See below).  ______________________  Chest pain can be caused by many different conditions. It can be caused by a condition that is life-threatening and requires treatment right away. It can also be caused by something that is not life-threatening. If you have chest pain, it can be hard to know the difference, so it is important to get help right away to make sure that you do not have a serious condition.    Some life-threatening causes of chest pain include:    Heart attack.  A tear in the body's main blood vessel (aortic dissection).  Inflammation around your heart (pericarditis).  A problem in the lungs, such as a blood clot (pulmonary embolism) or a collapsed lung (pneumothorax).    Some non life-threatening causes of chest pain include:    Heartburn.  Anxiety or stress.  Damage to the bones, muscles, and cartilage that make up your chest wall.  Pneumonia or bronchitis.  Shingles infection (varicella-zoster virus).    Chest pain can feel like:    Pain or discomfort on the surface of your chest or deep in your chest.  Crushing, pressure, aching, or squeezing pain.  Burning or tingling.  Dull or sharp pain that is worse when you move, cough, or take a deep breath.  Pain or discomfort that is also felt in your back, neck, jaw, shoulder, or arm, or pain that spreads to any of these areas.    Your chest pain may come and go. It may also be constant. Your health care provider will do lab tests and other studies to find the cause of your pain. Treatment will depend on the cause of your chest pain.    Follow these instructions at home:      Medicines    Take over-the-counter and prescription medicines only as told by your health care provider.  If you were prescribed an antibiotic, take it as told by your health care provider. Do not stop taking the antibiotic even if you start to feel better.        Lifestyle     Rest as directed by your health care provider.  Do not use any products that contain nicotine or tobacco, such as cigarettes and e-cigarettes. If you need help quitting, ask your health care provider.  Do not drink alcohol.  Make healthy lifestyle choices as recommended. These may include:    Getting regular exercise. Ask your health care provider to suggest some activities that are safe for you.  Eating a heart-healthy diet. This includes plenty of fresh fruits and vegetables, whole grains, low-fat (lean) protein, and low-fat dairy products. A dietitian can help you find healthy eating options.  Maintaining a healthy weight.  Managing any other health conditions you have, such as high blood pressure (hypertension) or diabetes.  Reducing stress, such as with yoga or relaxation techniques.        General instructions    Pay attention to any changes in your symptoms. Tell your health care provider about them or any new symptoms.  Avoid any activities that cause chest pain.  Keep all follow-up visits as told by your health care provider. This is important. This includes visits for any further testing if your chest pain does not go away.    Contact a health care provider if:  Your chest pain does not go away.  You feel depressed.  You have a fever.    Get help right away if:  Your chest pain gets worse.  You have a cough that gets worse, or you cough up blood.  You have severe pain in your abdomen.  You faint.  You have sudden, unexplained chest discomfort.  You have sudden, unexplained discomfort in your arms, back, neck, or jaw.  You have shortness of breath at any time.  You suddenly start to sweat, or your skin gets clammy.  You feel nausea or you vomit.  You suddenly feel lightheaded or dizzy.  You have severe weakness, or unexplained weakness or fatigue.  Your heart begins to beat quickly, or it feels like it is skipping beats.    These symptoms may represent a serious problem that is an emergency. Do not wait to see if the symptoms will go away. Get medical help right away. Call your local emergency services (911 in the [place] Do not drive yourself to the hospital.    Summary  Chest pain can be caused by a condition that is serious and requires urgent treatment. It may also be caused by something that is not life-threatening.  If you have chest pain, it is very important to see your health care provider. Your health care provider may do lab tests and other studies to find the cause of your pain.  Follow your health care provider's instructions on taking medicines, making lifestyle changes, and getting emergency treatment if symptoms become worse.  Keep all follow-up visits as told by your health care provider. This includes visits for any further testing if your chest pain does not go away.

## 2023-04-08 NOTE — ED ADULT NURSE NOTE - ED STAT RN HANDOFF DETAILS
pt  endorsed  to ANGELICA Casey  for  continuity of  care  pt  no  signs  of  any distress  ,  meal  offered  to  pt  , awaiting  for  dc  papers.

## 2023-04-08 NOTE — ED ADULT NURSE NOTE - OBJECTIVE STATEMENT
pt  is a 58 y/o  female  with c/o  dizziness  pt  able  to  speak  in full sentences  follow  instruction  well  no  signs  of  any deficit  on her  upper and  lower  extremity.  pt . ABC  intact  , walk  with steady gait  and pt  airway  patent.

## 2023-04-08 NOTE — ED PROVIDER NOTE - OBJECTIVE STATEMENT
57-year-old female with past medical history hypertension, diabetes, mild intermittent asthma presents with chest discomfort x5 days.  Patient reports chest discomfort located in the middle of her chest, worse when trying to swallow, reports lightheadedness when attempting to swallow.  Patient seen 2 days ago at [facility] for the same complaint with negative CTA of her head and neck.  Patient reports occasional chills, but denies any fevers, headache, vision change, shortness of breath, abdominal pain, vomiting, diarrhea, numbness, weakness, or rash.  Patient tolerating oral intake.  Denies any recent chest discomfort with exertion.  Denies any possibility of foreign body.  Denies any additional complaints.

## 2023-04-09 LAB
CULTURE RESULTS: SIGNIFICANT CHANGE UP
SPECIMEN SOURCE: SIGNIFICANT CHANGE UP

## 2023-04-10 ENCOUNTER — EMERGENCY (EMERGENCY)
Facility: HOSPITAL | Age: 58
LOS: 1 days | Discharge: HOME | End: 2023-04-10
Attending: EMERGENCY MEDICINE
Payer: COMMERCIAL

## 2023-04-10 VITALS
SYSTOLIC BLOOD PRESSURE: 173 MMHG | DIASTOLIC BLOOD PRESSURE: 95 MMHG | RESPIRATION RATE: 16 BRPM | HEIGHT: 64 IN | OXYGEN SATURATION: 100 % | TEMPERATURE: 98 F | WEIGHT: 135.58 LBS | HEART RATE: 68 BPM

## 2023-04-10 VITALS
TEMPERATURE: 98 F | DIASTOLIC BLOOD PRESSURE: 84 MMHG | OXYGEN SATURATION: 99 % | HEART RATE: 62 BPM | SYSTOLIC BLOOD PRESSURE: 170 MMHG | RESPIRATION RATE: 20 BRPM

## 2023-04-10 LAB
ALBUMIN SERPL ELPH-MCNC: 3.2 G/DL — LOW (ref 3.5–5)
ALP SERPL-CCNC: 113 U/L — SIGNIFICANT CHANGE UP (ref 40–120)
ALT FLD-CCNC: 20 U/L DA — SIGNIFICANT CHANGE UP (ref 10–60)
ANION GAP SERPL CALC-SCNC: 5 MMOL/L — SIGNIFICANT CHANGE UP (ref 5–17)
AST SERPL-CCNC: 30 U/L — SIGNIFICANT CHANGE UP (ref 10–40)
BASOPHILS # BLD AUTO: 0.04 K/UL — SIGNIFICANT CHANGE UP (ref 0–0.2)
BASOPHILS NFR BLD AUTO: 0.4 % — SIGNIFICANT CHANGE UP (ref 0–2)
BILIRUB SERPL-MCNC: 1 MG/DL — SIGNIFICANT CHANGE UP (ref 0.2–1.2)
BUN SERPL-MCNC: 11 MG/DL — SIGNIFICANT CHANGE UP (ref 7–18)
CALCIUM SERPL-MCNC: 9.3 MG/DL — SIGNIFICANT CHANGE UP (ref 8.4–10.5)
CHLORIDE SERPL-SCNC: 112 MMOL/L — HIGH (ref 96–108)
CO2 SERPL-SCNC: 23 MMOL/L — SIGNIFICANT CHANGE UP (ref 22–31)
CREAT SERPL-MCNC: 1.11 MG/DL — SIGNIFICANT CHANGE UP (ref 0.5–1.3)
EGFR: 58 ML/MIN/1.73M2 — LOW
EOSINOPHIL # BLD AUTO: 0.04 K/UL — SIGNIFICANT CHANGE UP (ref 0–0.5)
EOSINOPHIL NFR BLD AUTO: 0.4 % — SIGNIFICANT CHANGE UP (ref 0–6)
FLUAV AG NPH QL: SIGNIFICANT CHANGE UP
FLUBV AG NPH QL: SIGNIFICANT CHANGE UP
GLUCOSE SERPL-MCNC: 140 MG/DL — HIGH (ref 70–99)
HCT VFR BLD CALC: 37.6 % — SIGNIFICANT CHANGE UP (ref 34.5–45)
HGB BLD-MCNC: 11.1 G/DL — LOW (ref 11.5–15.5)
IMM GRANULOCYTES NFR BLD AUTO: 0.3 % — SIGNIFICANT CHANGE UP (ref 0–0.9)
LYMPHOCYTES # BLD AUTO: 1.68 K/UL — SIGNIFICANT CHANGE UP (ref 1–3.3)
LYMPHOCYTES # BLD AUTO: 18.2 % — SIGNIFICANT CHANGE UP (ref 13–44)
MAGNESIUM SERPL-MCNC: 2.2 MG/DL — SIGNIFICANT CHANGE UP (ref 1.6–2.6)
MCHC RBC-ENTMCNC: 17.5 PG — LOW (ref 27–34)
MCHC RBC-ENTMCNC: 29.5 GM/DL — LOW (ref 32–36)
MCV RBC AUTO: 59.4 FL — LOW (ref 80–100)
MONOCYTES # BLD AUTO: 0.39 K/UL — SIGNIFICANT CHANGE UP (ref 0–0.9)
MONOCYTES NFR BLD AUTO: 4.2 % — SIGNIFICANT CHANGE UP (ref 2–14)
NEUTROPHILS # BLD AUTO: 7.03 K/UL — SIGNIFICANT CHANGE UP (ref 1.8–7.4)
NEUTROPHILS NFR BLD AUTO: 76.5 % — SIGNIFICANT CHANGE UP (ref 43–77)
NRBC # BLD: 0 /100 WBCS — SIGNIFICANT CHANGE UP (ref 0–0)
PLATELET # BLD AUTO: 262 K/UL — SIGNIFICANT CHANGE UP (ref 150–400)
POTASSIUM SERPL-MCNC: 4.5 MMOL/L — SIGNIFICANT CHANGE UP (ref 3.5–5.3)
POTASSIUM SERPL-SCNC: 4.5 MMOL/L — SIGNIFICANT CHANGE UP (ref 3.5–5.3)
PROT SERPL-MCNC: 7.2 G/DL — SIGNIFICANT CHANGE UP (ref 6–8.3)
RBC # BLD: 6.33 M/UL — HIGH (ref 3.8–5.2)
RBC # FLD: 18.9 % — HIGH (ref 10.3–14.5)
SARS-COV-2 RNA SPEC QL NAA+PROBE: SIGNIFICANT CHANGE UP
SODIUM SERPL-SCNC: 140 MMOL/L — SIGNIFICANT CHANGE UP (ref 135–145)
TROPONIN I, HIGH SENSITIVITY RESULT: 5.3 NG/L — SIGNIFICANT CHANGE UP
WBC # BLD: 9.21 K/UL — SIGNIFICANT CHANGE UP (ref 3.8–10.5)
WBC # FLD AUTO: 9.21 K/UL — SIGNIFICANT CHANGE UP (ref 3.8–10.5)

## 2023-04-10 PROCEDURE — 87637 SARSCOV2&INF A&B&RSV AMP PRB: CPT

## 2023-04-10 PROCEDURE — 99285 EMERGENCY DEPT VISIT HI MDM: CPT

## 2023-04-10 PROCEDURE — 93005 ELECTROCARDIOGRAM TRACING: CPT

## 2023-04-10 PROCEDURE — 71045 X-RAY EXAM CHEST 1 VIEW: CPT | Mod: 26

## 2023-04-10 PROCEDURE — 83735 ASSAY OF MAGNESIUM: CPT

## 2023-04-10 PROCEDURE — 71045 X-RAY EXAM CHEST 1 VIEW: CPT

## 2023-04-10 PROCEDURE — 80053 COMPREHEN METABOLIC PANEL: CPT

## 2023-04-10 PROCEDURE — 36415 COLL VENOUS BLD VENIPUNCTURE: CPT

## 2023-04-10 PROCEDURE — 99285 EMERGENCY DEPT VISIT HI MDM: CPT | Mod: 25

## 2023-04-10 PROCEDURE — 82962 GLUCOSE BLOOD TEST: CPT

## 2023-04-10 PROCEDURE — 84484 ASSAY OF TROPONIN QUANT: CPT

## 2023-04-10 PROCEDURE — 85025 COMPLETE CBC W/AUTO DIFF WBC: CPT

## 2023-04-10 NOTE — ED PROVIDER NOTE - OBJECTIVE STATEMENT
57-year-old female history of diabetes, hypertension, asthma presents the ED with episodes of chest pain, elevated blood pressure, and blurry vision this morning.  Patient seen in the ED on April 6 for difficulty swallowing.  Patient had a negative CTA head and neck.  Patient returned to ED on 8 April now with chest pain.  Patient had a CTA of the chest which was also negative for any pulmonary embolism.  As per patient this morning she woke up she felt that her vision was blurry.  Patient checked her pressure and it was 184/110 at home.  Patient was concerned and thus came to the ED for evaluation.  Patient says she is compliant with her blood pressure medications.  Upon arrival to ED blurry vision have resolved.  Patient is reporting some chest tightness.  As per patient her sore throat is better and she is now able to swallow more comfortably

## 2023-04-10 NOTE — ED ADULT NURSE NOTE - CAS TRG GEN SKIN CONDITION
ID Attending Progress Note    ASSESSMENT  - Status post BAL and bronchial biopsies on September 2 as part of work-up of atypical right lung infiltrates.  Cultures negative to date, biopsies result pending.  On vancomycin and cefepime empirically.  - Large right lower lobe infiltrate probable pneumonia with stable mediastinal lymphadenopathy.  And moderate right pleural effusion, urine histo and blasto antigens were negative earlier this month.  Possible infectious versus noninfectious process  -Leukocytosis, resolved  - Hypertensive heart disease, dyslipidemia, asthma     PLAN  - Continue cefepime.  - Follow-up BAL cultures, so far negative to date  - Follow-up bronchial biopsies.  - Discussed with patient and family ,lyndsey pulm.    --------------------------------------------------------------------------------------------------------  Subjective:   Cough at night   Off oxygen   SOB better   No nausea, vomiting, diarrhea  No fevers    ROS:    A 10 points review of system was done and is negative except for as listed above.    Objective:    Vital signs in last 24 hours:  Temp:  [98.2 °F (36.8 °C)-99 °F (37.2 °C)] 99 °F (37.2 °C)  Heart Rate:  [] 104  Resp:  [16-18] 16  BP: ()/(53-62) 107/53    Physical Exam:  GENERAL: Normocephalic atraumatic, in NAD, on RA, looks better   HEENT: Anicteric sclera, well injected conjunctiva  NECK: supple  HEART: RRR, no audible murmur  LUNGS: CIELO, decreased on the right primarily at the base no wheezing rales or rhonchi  ABDOMEN: Soft, nondistended non tender to palpation  LOWER EXTREMITIES: no edema, no cyanosis  SKIN: no rash  NEURO: AO X3         MEDICATIONS   Current Facility-Administered Medications   Medication Dose Route Frequency Provider Last Rate Last Admin   • ipratropium-albuterol (DUONEB) 0.5-2.5 (3) MG/3ML nebulizer solution 3 mL  3 mL Nebulization 4x Daily Resp Orin Rae, MD   3 mL at 09/06/22 1531   • cefepime (MAXIPIME) 1,000 mg in sodium chloride 0.9 %  100 mL IVPB  1,000 mg Intravenous 3 times per day Keyon Malik  mL/hr at 09/06/22 1240 1,000 mg at 09/06/22 1240   • pantoprazole (PROTONIX) EC tablet 40 mg  40 mg Oral QAM AC Keyon Malik MD   40 mg at 09/06/22 0539   • lactated ringers bolus 1,000 mL  1,000 mL Intravenous Once Deena Barrera MD        Followed by   • lactated ringers bolus 1,000 mL  1,000 mL Intravenous Once Deena Barrera MD       • atorvastatin (LIPITOR) tablet 20 mg  20 mg Oral Nightly Deena Barrera MD   20 mg at 09/05/22 2200   • montelukast (SINGULAIR) tablet 10 mg  10 mg Oral Q Evening Deena Barrera MD   10 mg at 09/05/22 1712   • gabapentin (NEURONTIN) capsule 100 mg  100 mg Oral Daily Deena Barrera MD   100 mg at 09/06/22 0957   • baclofen (LIORESAL) tablet 10 mg  10 mg Oral Nightly Deena Barrera MD   10 mg at 09/05/22 2200   • ketotifen (ZADITOR) 0.025 % ophthalmic solution 1 drop  1 drop Both Eyes BID Deena Barrera MD   1 drop at 09/06/22 0959   • [Held by provider] lisinopril-hydroCHLOROthiazide 10-12.5 mg   Oral Daily Deena Barrera MD       • fluticasone-vilanterol (BREO ELLIPTA) 200-25 MCG/INH inhaler 1 puff  1 puff Inhalation Daily Resp Deena Barrera MD   1 puff at 09/06/22 0756   • sodium chloride (PF) 0.9 % injection 2 mL  2 mL Intracatheter 2 times per day Deena Barrera MD   2 mL at 09/06/22 1000   • enoxaparin (LOVENOX) injection 40 mg  40 mg Subcutaneous Daily Deena Barrera MD   40 mg at 09/06/22 0957      Current Facility-Administered Medications   Medication Dose Route Frequency Provider Last Rate Last Admin   • acetaminophen (TYLENOL) tablet 650 mg  650 mg Oral Q4H PRN Keyon Malik MD   650 mg at 09/06/22 0306   • diphenhydrAMINE (BENADRYL) capsule 25 mg  25 mg Oral Q4H PRN José Martinez MD   25 mg at 09/05/22 6755   • albuterol inhaler 2 puff  2 puff Inhalation Q4H PRN Deena Barrera MD        • guaiFENesin-DM (ROBITUSSIN DM) 100-10 MG/5ML syrup 10 mL  10 mL Oral Q4H PRN Deena Barrera MD   10 mL at 09/06/22 0957   • sodium chloride 0.9 % flush bag 25 mL  25 mL Intravenous PRN Deena Barrera MD       • sodium chloride 0.9% infusion   Intravenous Continuous PRN Deena Barrera MD       • sodium chloride 0.9% infusion   Intravenous Continuous PRN Deena Barrera MD       • benzonatate (TESSALON PERLES) capsule 100 mg  100 mg Oral TID PRN Deena Barrera MD   100 mg at 09/04/22 1003                  Laboratory data reviewed    Recent Labs   Lab 09/06/22  0500 09/05/22  0515 09/04/22  0514   HGB 10.8* 11.5* 12.3   HCT 32.3* 34.7* 37.0    295 314   WBC 10.0 8.9 9.4   RBC 3.59* 3.83* 4.07   MCV 90.0 90.6 90.9     Recent Labs     09/04/22  0514 09/05/22  0515 09/06/22  0500   ALBUMIN 1.8* 1.7* 1.9*   AST 14 14 16   CO2 23 25 24   CALCIUM 7.5* 7.5* 7.7*   CREATININE 0.72 0.74 0.68   BUN 9 10 7      Recent Labs     09/06/22  0500   BILIRUBIN 0.4         Micro reviewed     Radiology reviewed     MD Varsha [facility]  [phone]    9/6/2022  5:57 PM   Warm

## 2023-04-10 NOTE — ED ADULT NURSE NOTE - OBJECTIVE STATEMENT
Pt c/o chest discomfort and tingling sensation to left arm x week. Pt reports was here 2 days ago with same complaint. Pt speak clear and full, ambulate steady gait. no deficit noted.

## 2023-04-10 NOTE — ED PROVIDER NOTE - NSFOLLOWUPINSTRUCTIONS_ED_ALL_ED_FT
Nonspecific Chest Pain, Adult  Chest pain is an uncomfortable, tight, or painful feeling in the chest. The pain can feel like a crushing, aching, or squeezing pressure. A person can feel a burning or tingling sensation. Chest pain can also be felt in your back, neck, jaw, shoulder, or arm. This pain can be worse when you move, sneeze, or take a deep breath.    Chest pain can be caused by a condition that is life-threatening. This must be treated right away. It can also be caused by something that is not life-threatening. If you have chest pain, it can be hard to know the difference, so it is important to get help right away to make sure that you do not have a serious condition.    Some life-threatening causes of chest pain include:  Heart attack.  A tear in the body's main blood vessel (aortic dissection).  Inflammation around your heart (pericarditis).  A problem in the lungs, such as a blood clot (pulmonary embolism) or a collapsed lung (pneumothorax).  Some non life-threatening causes of chest pain include:  Heartburn.  Anxiety or stress.  Damage to the bones, muscles, and cartilage that make up your chest wall.  Pneumonia or bronchitis.  Shingles infection (varicella-zoster virus).  Your chest pain may come and go. It may also be constant. Your health care provider will do tests and other studies to find the cause of your pain. Treatment will depend on the cause of your chest pain.    Follow these instructions at home:  Medicines    Take over-the-counter and prescription medicines only as told by your health care provider.  If you were prescribed an antibiotic medicine, take it as told by your health care provider. Do not stop taking the antibiotic even if you start to feel better.  Activity    Avoid any activities that cause chest pain.  Do not lift anything that is heavier than 10 lb (4.5 kg), or the limit that you are told, until your health care provider says that it is safe.  Rest as directed by your health care provider.  Return to your normal activities only as told by your health care provider. Ask your health care provider what activities are safe for you.  Lifestyle    A plate along with examples of foods in a healthy diet.  Silhouette of a person sitting on the floor doing yoga.  Do not use any products that contain nicotine or tobacco, such as cigarettes, e-cigarettes, and chewing tobacco. If you need help quitting, ask your health care provider.  Do not drink alcohol.  Make healthy lifestyle changes as recommended. These may include:  Getting regular exercise. Ask your health care provider to suggest some exercises that are safe for you.  Eating a heart-healthy diet. This includes plenty of fresh fruits and vegetables, whole grains, low-fat (lean) protein, and low-fat dairy products. A dietitian can help you find healthy eating options.  Maintaining a healthy weight.  Managing any other health conditions you may have, such as high blood pressure (hypertension) or diabetes.  Reducing stress, such as with yoga or relaxation techniques.  General instructions    Pay attention to any changes in your symptoms.  It is up to you to get the results of any tests that were done. Ask your health care provider, or the department that is doing the tests, when your results will be ready.  Keep all follow-up visits as told by your health care provider. This is important.  You may be asked to go for further testing if your chest pain does not go away.  Contact a health care provider if:  Your chest pain does not go away.  You feel depressed.  You have a fever.  You notice changes in your symptoms or develop new symptoms.  Get help right away if:  Your chest pain gets worse.  You have a cough that gets worse, or you cough up blood.  You have severe pain in your abdomen.  You faint.  You have sudden, unexplained chest discomfort.  You have sudden, unexplained discomfort in your arms, back, neck, or jaw.  You have shortness of breath at any time.  You suddenly start to sweat, or your skin gets clammy.  You feel nausea or you vomit.  You suddenly feel lightheaded or dizzy.  You have severe weakness, or unexplained weakness or fatigue.  Your heart begins to beat quickly, or it feels like it is skipping beats.  These symptoms may represent a serious problem that is an emergency. Do not wait to see if the symptoms will go away. Get medical help right away. Call your local emergency services (911 in the [place] Do not drive yourself to the hospital.    Summary  Chest pain can be caused by a condition that is serious and requires urgent treatment. It may also be caused by something that is not life-threatening.  Your health care provider may do lab tests and other studies to find the cause of your pain.  Follow your health care provider's instructions on taking medicines, making lifestyle changes, and getting emergency treatment if symptoms become worse.  Keep all follow-up visits as told by your health care provider. This includes visits for any further testing if your chest pain does not go away.  This information is not intended to replace advice given to you by your health care provider. Make sure you discuss any questions you have with your health care provider.    Document Revised: 03/03/2022 Document Reviewed: 03/03/2022

## 2023-04-10 NOTE — ED PROVIDER NOTE - NS ED MD DISPO DISCHARGE CCDA
Last Appointment Date: 10/13/2021  Next Appointment Date: 10/13/2022    Allergies   Allergen Reactions    Sulfa Antibiotics      Pt is only allergic to the Sulfa Creams.  Childhood allergy     Patient needs refill on   Requested Prescriptions     Pending Prescriptions Disp Refills    tiZANidine (ZANAFLEX) 4 MG tablet 90 tablet 0     Sig: TAKE ONE TABLET EVERY 8 HOURS AS NEEDED FOR SPASMS Patient/Caregiver provided printed discharge information.

## 2023-04-10 NOTE — ED PROVIDER NOTE - PATIENT PORTAL LINK FT
You can access the FollowMyHealth Patient Portal offered by [facility] by registering at the following website: [email or web address]. By joining [employer]’s FollowMyHealth portal, you will also be able to view your health information using other applications (apps) compatible with our system.

## 2023-04-10 NOTE — ED PROVIDER NOTE - PROGRESS NOTE DETAILS
labs and imaging negative.  pt reassessed and chest pain free.  pt given copy of all results.  Will dc

## 2023-04-10 NOTE — ED PROVIDER NOTE - CLINICAL SUMMARY MEDICAL DECISION MAKING FREE TEXT BOX
57-year-old female history of diabetes, hypertension, asthma, presents to ED with chest pain and elevated blood pressure.  Patient was reporting blurry vision which is since resolved.  Exam currently unremarkable.  Patient seen in ED 2 days ago had negative CTA chest.  Low suspicion for PE or ACS.  Possibly anxiety.  Will check labs Trope EKG reassess

## 2023-04-19 ENCOUNTER — INPATIENT (INPATIENT)
Facility: HOSPITAL | Age: 58
LOS: 1 days | Discharge: HOME | DRG: 305 | End: 2023-04-21
Attending: STUDENT IN AN ORGANIZED HEALTH CARE EDUCATION/TRAINING PROGRAM | Admitting: STUDENT IN AN ORGANIZED HEALTH CARE EDUCATION/TRAINING PROGRAM
Payer: COMMERCIAL

## 2023-04-19 VITALS
DIASTOLIC BLOOD PRESSURE: 93 MMHG | OXYGEN SATURATION: 99 % | TEMPERATURE: 98 F | HEIGHT: 64 IN | RESPIRATION RATE: 16 BRPM | HEART RATE: 65 BPM | WEIGHT: 134.48 LBS | SYSTOLIC BLOOD PRESSURE: 193 MMHG

## 2023-04-19 LAB
ALBUMIN SERPL ELPH-MCNC: 3.2 G/DL — LOW (ref 3.5–5)
ALP SERPL-CCNC: 99 U/L — SIGNIFICANT CHANGE UP (ref 40–120)
ALT FLD-CCNC: 19 U/L DA — SIGNIFICANT CHANGE UP (ref 10–60)
ANION GAP SERPL CALC-SCNC: 7 MMOL/L — SIGNIFICANT CHANGE UP (ref 5–17)
AST SERPL-CCNC: 8 U/L — LOW (ref 10–40)
BASOPHILS # BLD AUTO: 0.04 K/UL — SIGNIFICANT CHANGE UP (ref 0–0.2)
BASOPHILS NFR BLD AUTO: 0.5 % — SIGNIFICANT CHANGE UP (ref 0–2)
BILIRUB SERPL-MCNC: 0.7 MG/DL — SIGNIFICANT CHANGE UP (ref 0.2–1.2)
BUN SERPL-MCNC: 16 MG/DL — SIGNIFICANT CHANGE UP (ref 7–18)
CALCIUM SERPL-MCNC: 9.3 MG/DL — SIGNIFICANT CHANGE UP (ref 8.4–10.5)
CHLORIDE SERPL-SCNC: 111 MMOL/L — HIGH (ref 96–108)
CO2 SERPL-SCNC: 24 MMOL/L — SIGNIFICANT CHANGE UP (ref 22–31)
CREAT SERPL-MCNC: 1.19 MG/DL — SIGNIFICANT CHANGE UP (ref 0.5–1.3)
EGFR: 53 ML/MIN/1.73M2 — LOW
EOSINOPHIL # BLD AUTO: 0.28 K/UL — SIGNIFICANT CHANGE UP (ref 0–0.5)
EOSINOPHIL NFR BLD AUTO: 3.3 % — SIGNIFICANT CHANGE UP (ref 0–6)
GLUCOSE SERPL-MCNC: 204 MG/DL — HIGH (ref 70–99)
HCT VFR BLD CALC: 36.1 % — SIGNIFICANT CHANGE UP (ref 34.5–45)
HGB BLD-MCNC: 10.6 G/DL — LOW (ref 11.5–15.5)
IMM GRANULOCYTES NFR BLD AUTO: 0.1 % — SIGNIFICANT CHANGE UP (ref 0–0.9)
LYMPHOCYTES # BLD AUTO: 2.85 K/UL — SIGNIFICANT CHANGE UP (ref 1–3.3)
LYMPHOCYTES # BLD AUTO: 33.5 % — SIGNIFICANT CHANGE UP (ref 13–44)
MAGNESIUM SERPL-MCNC: 2.3 MG/DL — SIGNIFICANT CHANGE UP (ref 1.6–2.6)
MCHC RBC-ENTMCNC: 17.4 PG — LOW (ref 27–34)
MCHC RBC-ENTMCNC: 29.4 GM/DL — LOW (ref 32–36)
MCV RBC AUTO: 59.3 FL — LOW (ref 80–100)
MONOCYTES # BLD AUTO: 0.52 K/UL — SIGNIFICANT CHANGE UP (ref 0–0.9)
MONOCYTES NFR BLD AUTO: 6.1 % — SIGNIFICANT CHANGE UP (ref 2–14)
NEUTROPHILS # BLD AUTO: 4.81 K/UL — SIGNIFICANT CHANGE UP (ref 1.8–7.4)
NEUTROPHILS NFR BLD AUTO: 56.5 % — SIGNIFICANT CHANGE UP (ref 43–77)
NRBC # BLD: 0 /100 WBCS — SIGNIFICANT CHANGE UP (ref 0–0)
NT-PROBNP SERPL-SCNC: 68 PG/ML — SIGNIFICANT CHANGE UP (ref 0–125)
PLATELET # BLD AUTO: 238 K/UL — SIGNIFICANT CHANGE UP (ref 150–400)
POTASSIUM SERPL-MCNC: 3.9 MMOL/L — SIGNIFICANT CHANGE UP (ref 3.5–5.3)
POTASSIUM SERPL-SCNC: 3.9 MMOL/L — SIGNIFICANT CHANGE UP (ref 3.5–5.3)
PROT SERPL-MCNC: 7.1 G/DL — SIGNIFICANT CHANGE UP (ref 6–8.3)
RBC # BLD: 6.09 M/UL — HIGH (ref 3.8–5.2)
RBC # FLD: 18.3 % — HIGH (ref 10.3–14.5)
SODIUM SERPL-SCNC: 142 MMOL/L — SIGNIFICANT CHANGE UP (ref 135–145)
TROPONIN I, HIGH SENSITIVITY RESULT: 5.4 NG/L — SIGNIFICANT CHANGE UP
WBC # BLD: 8.51 K/UL — SIGNIFICANT CHANGE UP (ref 3.8–10.5)
WBC # FLD AUTO: 8.51 K/UL — SIGNIFICANT CHANGE UP (ref 3.8–10.5)

## 2023-04-19 PROCEDURE — 71045 X-RAY EXAM CHEST 1 VIEW: CPT | Mod: 26

## 2023-04-19 PROCEDURE — 99285 EMERGENCY DEPT VISIT HI MDM: CPT

## 2023-04-19 NOTE — ED ADULT NURSE NOTE - CARDIO ASSESSMENT
Bed: 04  Expected date: 10/13/19  Expected time: 12:45 PM  Means of arrival: [facility]  Comments:  Fall   ---

## 2023-04-19 NOTE — ED ADULT NURSE NOTE - CAS DISCH BELONGINGS RETURNED
please get remote checks for pacer or ICD every 3 months and Office check in 1 yr  Please call our office after every transmission to confirm success of transmission  There are no discontinued medications. Orders Placed This Encounter    PFT DLCO     Standing Status:   Future     Standing Expiration Date:   9/15/2017    PULMONARY FUNCTION TEST     Standing Status:   Future     Standing Expiration Date:   9/15/2017          Atrial Fibrillation: Care Instructions  Your Care Instructions    Atrial fibrillation is an irregular and often fast heartbeat. Treating this condition is important for several reasons. It can cause blood clots, which can travel from your heart to your brain and cause a stroke. If you have a fast heartbeat, you may feel lightheaded, dizzy, and weak. An irregular heartbeat can also increase your risk for heart failure. Atrial fibrillation is often the result of another heart condition, such as high blood pressure or coronary artery disease. Making changes to improve your heart condition will help you stay healthy and active. Follow-up care is a key part of your treatment and safety. Be sure to make and go to all appointments, and call your doctor if you are having problems. It's also a good idea to know your test results and keep a list of the medicines you take. How can you care for yourself at home? Medicines  · Take your medicines exactly as prescribed. Call your doctor if you think you are having a problem with your medicine. You will get more details on the specific medicines your doctor prescribes. · If your doctor has given you a blood thinner to prevent a stroke, be sure you get instructions about how to take your medicine safely. Blood thinners can cause serious bleeding problems. · Do not take any vitamins, over-the-counter drugs, or herbal products without talking to your doctor first.  Lifestyle changes  · Do not smoke.  Smoking can increase your chance of a stroke and heart attack. If you need help quitting, talk to your doctor about stop-smoking programs and medicines. These can increase your chances of quitting for good. · Eat a heart-healthy diet. · Stay at a healthy weight. Lose weight if you need to. · Limit alcohol to 2 drinks a day for men and 1 drink a day for women. Too much alcohol can cause health problems. · Avoid colds and flu. Get a pneumococcal vaccine shot. If you have had one before, ask your doctor whether you need another dose. Get a flu shot every year. If you must be around people with colds or flu, wash your hands often. Activity  · If your doctor recommends it, get more exercise. Walking is a good choice. Bit by bit, increase the amount you walk every day. Try for at least 30 minutes on most days of the week. You also may want to swim, bike, or do other activities. Your doctor may suggest that you join a cardiac rehabilitation program so that you can have help increasing your physical activity safely. · Start light exercise if your doctor says it is okay. Even a small amount will help you get stronger, have more energy, and manage stress. Walking is an easy way to get exercise. Start out by walking a little more than you did in the hospital. Gradually increase the amount you walk. · When you exercise, watch for signs that your heart is working too hard. You are pushing too hard if you cannot talk while you are exercising. If you become short of breath or dizzy or have chest pain, sit down and rest immediately. · Check your pulse regularly. Place two fingers on the artery at the palm side of your wrist, in line with your thumb. If your heartbeat seems uneven or fast, talk to your doctor. When should you call for help? Call 911 anytime you think you may need emergency care. For example, call if:  · You have symptoms of a heart attack. These may include:  ¨ Chest pain or pressure, or a strange feeling in the chest.  ¨ Sweating.   ¨ Shortness of breath. ¨ Nausea or vomiting. ¨ Pain, pressure, or a strange feeling in the back, neck, jaw, or upper belly or in one or both shoulders or arms. ¨ Lightheadedness or sudden weakness. ¨ A fast or irregular heartbeat. After you call 911, the  may tell you to chew 1 adult-strength or 2 to 4 low-dose aspirin. Wait for an ambulance. Do not try to drive yourself. · You have symptoms of a stroke. These may include:  ¨ Sudden numbness, tingling, weakness, or loss of movement in your face, arm, or leg, especially on only one side of your body. ¨ Sudden vision changes. ¨ Sudden trouble speaking. ¨ Sudden confusion or trouble understanding simple statements. ¨ Sudden problems with walking or balance. ¨ A sudden, severe headache that is different from past headaches. · You passed out (lost consciousness). Call your doctor now or seek immediate medical care if:  · You have new or increased shortness of breath. · You feel dizzy or lightheaded, or you feel like you may faint. · Your heart rate becomes irregular. · You can feel your heart flutter in your chest or skip heartbeats. Tell your doctor if these symptoms are new or worse. Watch closely for changes in your health, and be sure to contact your doctor if you have any problems. Where can you learn more? Go to http://margarita-eddie.info/. Enter U020 in the search box to learn more about \"Atrial Fibrillation: Care Instructions. \"  Current as of: January 27, 2016  Content Version: 11.2  © 3058-7016 [employer]. Care instructions adapted under license by [employer] (which disclaims liability or warranty for this information). If you have questions about a medical condition or this instruction, always ask your healthcare professional. [place] [address] any warranty or liability for your use of this information.       Patient is scheduled to have a PFT scheduled at [facility] on 7/5/17 @ 10:45 Not applicable

## 2023-04-19 NOTE — ED ADULT TRIAGE NOTE - CHIEF COMPLAINT QUOTE
I have a chest tightness started at 12 pm , and left sided facial numbness and tingling started at 12 pm , numbness and tingling in both hands since 3-4 pm and blurry vision

## 2023-04-20 DIAGNOSIS — Z29.9 ENCOUNTER FOR PROPHYLACTIC MEASURES, UNSPECIFIED: ICD-10-CM

## 2023-04-20 DIAGNOSIS — E11.9 TYPE 2 DIABETES MELLITUS WITHOUT COMPLICATIONS: ICD-10-CM

## 2023-04-20 DIAGNOSIS — I16.9 HYPERTENSIVE CRISIS, UNSPECIFIED: ICD-10-CM

## 2023-04-20 DIAGNOSIS — R07.9 CHEST PAIN, UNSPECIFIED: ICD-10-CM

## 2023-04-20 DIAGNOSIS — D64.9 ANEMIA, UNSPECIFIED: ICD-10-CM

## 2023-04-20 DIAGNOSIS — I16.0 HYPERTENSIVE URGENCY: ICD-10-CM

## 2023-04-20 DIAGNOSIS — I10 ESSENTIAL (PRIMARY) HYPERTENSION: ICD-10-CM

## 2023-04-20 LAB
A1C WITH ESTIMATED AVERAGE GLUCOSE RESULT: 11.7 % — HIGH (ref 4–5.6)
ALBUMIN SERPL ELPH-MCNC: 3.4 G/DL — LOW (ref 3.5–5)
ALP SERPL-CCNC: 102 U/L — SIGNIFICANT CHANGE UP (ref 40–120)
ALT FLD-CCNC: 18 U/L DA — SIGNIFICANT CHANGE UP (ref 10–60)
ANION GAP SERPL CALC-SCNC: 7 MMOL/L — SIGNIFICANT CHANGE UP (ref 5–17)
AST SERPL-CCNC: 8 U/L — LOW (ref 10–40)
BILIRUB SERPL-MCNC: 0.8 MG/DL — SIGNIFICANT CHANGE UP (ref 0.2–1.2)
BUN SERPL-MCNC: 16 MG/DL — SIGNIFICANT CHANGE UP (ref 7–18)
CALCIUM SERPL-MCNC: 9.3 MG/DL — SIGNIFICANT CHANGE UP (ref 8.4–10.5)
CHLORIDE SERPL-SCNC: 111 MMOL/L — HIGH (ref 96–108)
CHOLEST SERPL-MCNC: 127 MG/DL — SIGNIFICANT CHANGE UP
CO2 SERPL-SCNC: 24 MMOL/L — SIGNIFICANT CHANGE UP (ref 22–31)
CREAT SERPL-MCNC: 1.02 MG/DL — SIGNIFICANT CHANGE UP (ref 0.5–1.3)
EGFR: 64 ML/MIN/1.73M2 — SIGNIFICANT CHANGE UP
ESTIMATED AVERAGE GLUCOSE: 289 MG/DL — HIGH (ref 68–114)
FERRITIN SERPL-MCNC: 34 NG/ML — SIGNIFICANT CHANGE UP (ref 15–150)
GLUCOSE BLDC GLUCOMTR-MCNC: 150 MG/DL — HIGH (ref 70–99)
GLUCOSE BLDC GLUCOMTR-MCNC: 160 MG/DL — HIGH (ref 70–99)
GLUCOSE BLDC GLUCOMTR-MCNC: 164 MG/DL — HIGH (ref 70–99)
GLUCOSE BLDC GLUCOMTR-MCNC: 178 MG/DL — HIGH (ref 70–99)
GLUCOSE SERPL-MCNC: 174 MG/DL — HIGH (ref 70–99)
HCT VFR BLD CALC: 37.8 % — SIGNIFICANT CHANGE UP (ref 34.5–45)
HDLC SERPL-MCNC: 68 MG/DL — SIGNIFICANT CHANGE UP
HGB BLD-MCNC: 11.1 G/DL — LOW (ref 11.5–15.5)
IRON SATN MFR SERPL: 28 UG/DL — LOW (ref 40–150)
IRON SATN MFR SERPL: 8 % — LOW (ref 15–50)
LIPID PNL WITH DIRECT LDL SERPL: 38 MG/DL — SIGNIFICANT CHANGE UP
MAGNESIUM SERPL-MCNC: 2.2 MG/DL — SIGNIFICANT CHANGE UP (ref 1.6–2.6)
MCHC RBC-ENTMCNC: 17.5 PG — LOW (ref 27–34)
MCHC RBC-ENTMCNC: 29.4 GM/DL — LOW (ref 32–36)
MCV RBC AUTO: 59.4 FL — LOW (ref 80–100)
NON HDL CHOLESTEROL: 59 MG/DL — SIGNIFICANT CHANGE UP
NRBC # BLD: 0 /100 WBCS — SIGNIFICANT CHANGE UP (ref 0–0)
PHOSPHATE SERPL-MCNC: 3.4 MG/DL — SIGNIFICANT CHANGE UP (ref 2.5–4.5)
PLATELET # BLD AUTO: 248 K/UL — SIGNIFICANT CHANGE UP (ref 150–400)
POTASSIUM SERPL-MCNC: 4.8 MMOL/L — SIGNIFICANT CHANGE UP (ref 3.5–5.3)
POTASSIUM SERPL-SCNC: 4.8 MMOL/L — SIGNIFICANT CHANGE UP (ref 3.5–5.3)
PROT SERPL-MCNC: 7.6 G/DL — SIGNIFICANT CHANGE UP (ref 6–8.3)
RBC # BLD: 6.36 M/UL — HIGH (ref 3.8–5.2)
RBC # FLD: 18.6 % — HIGH (ref 10.3–14.5)
SARS-COV-2 RNA SPEC QL NAA+PROBE: SIGNIFICANT CHANGE UP
SODIUM SERPL-SCNC: 142 MMOL/L — SIGNIFICANT CHANGE UP (ref 135–145)
TIBC SERPL-MCNC: 347 UG/DL — SIGNIFICANT CHANGE UP (ref 250–450)
TRIGL SERPL-MCNC: 106 MG/DL — SIGNIFICANT CHANGE UP
TROPONIN I, HIGH SENSITIVITY RESULT: 4.7 NG/L — SIGNIFICANT CHANGE UP
TROPONIN I, HIGH SENSITIVITY RESULT: 5.2 NG/L — SIGNIFICANT CHANGE UP
UIBC SERPL-MCNC: 319 UG/DL — SIGNIFICANT CHANGE UP (ref 110–370)
WBC # BLD: 8.25 K/UL — SIGNIFICANT CHANGE UP (ref 3.8–10.5)
WBC # FLD AUTO: 8.25 K/UL — SIGNIFICANT CHANGE UP (ref 3.8–10.5)

## 2023-04-20 PROCEDURE — 78452 HT MUSCLE IMAGE SPECT MULT: CPT | Mod: 26

## 2023-04-20 PROCEDURE — 93018 CV STRESS TEST I&R ONLY: CPT

## 2023-04-20 PROCEDURE — 70450 CT HEAD/BRAIN W/O DYE: CPT | Mod: 26

## 2023-04-20 PROCEDURE — 93016 CV STRESS TEST SUPVJ ONLY: CPT

## 2023-04-20 PROCEDURE — 99223 1ST HOSP IP/OBS HIGH 75: CPT | Mod: GC

## 2023-04-20 RX ORDER — INSULIN GLARGINE 100 [IU]/ML
40 INJECTION, SOLUTION SUBCUTANEOUS AT BEDTIME
Refills: 0 | Status: DISCONTINUED | OUTPATIENT
Start: 2023-04-20 | End: 2023-04-21

## 2023-04-20 RX ORDER — METFORMIN HYDROCHLORIDE 850 MG/1
1 TABLET ORAL
Refills: 0 | DISCHARGE

## 2023-04-20 RX ORDER — INSULIN GLARGINE 100 [IU]/ML
70 INJECTION, SOLUTION SUBCUTANEOUS
Refills: 0 | DISCHARGE

## 2023-04-20 RX ORDER — HYDRALAZINE HCL 50 MG
10 TABLET ORAL ONCE
Refills: 0 | Status: COMPLETED | OUTPATIENT
Start: 2023-04-20 | End: 2023-04-20

## 2023-04-20 RX ORDER — ACETAMINOPHEN 500 MG
650 TABLET ORAL EVERY 6 HOURS
Refills: 0 | Status: DISCONTINUED | OUTPATIENT
Start: 2023-04-20 | End: 2023-04-21

## 2023-04-20 RX ORDER — ATORVASTATIN CALCIUM 80 MG/1
40 TABLET, FILM COATED ORAL AT BEDTIME
Refills: 0 | Status: DISCONTINUED | OUTPATIENT
Start: 2023-04-20 | End: 2023-04-20

## 2023-04-20 RX ORDER — CARVEDILOL PHOSPHATE 80 MG/1
12.5 CAPSULE, EXTENDED RELEASE ORAL EVERY 12 HOURS
Refills: 0 | Status: DISCONTINUED | OUTPATIENT
Start: 2023-04-20 | End: 2023-04-21

## 2023-04-20 RX ORDER — INSULIN LISPRO 100/ML
VIAL (ML) SUBCUTANEOUS
Refills: 0 | Status: DISCONTINUED | OUTPATIENT
Start: 2023-04-20 | End: 2023-04-21

## 2023-04-20 RX ORDER — TRAMADOL HYDROCHLORIDE 50 MG/1
50 TABLET ORAL ONCE
Refills: 0 | Status: DISCONTINUED | OUTPATIENT
Start: 2023-04-20 | End: 2023-04-20

## 2023-04-20 RX ORDER — MORPHINE SULFATE 50 MG/1
2 CAPSULE, EXTENDED RELEASE ORAL ONCE
Refills: 0 | Status: DISCONTINUED | OUTPATIENT
Start: 2023-04-20 | End: 2023-04-20

## 2023-04-20 RX ORDER — LOSARTAN POTASSIUM 100 MG/1
1 TABLET, FILM COATED ORAL
Refills: 0 | DISCHARGE

## 2023-04-20 RX ORDER — ENOXAPARIN SODIUM 100 MG/ML
40 INJECTION SUBCUTANEOUS EVERY 24 HOURS
Refills: 0 | Status: DISCONTINUED | OUTPATIENT
Start: 2023-04-20 | End: 2023-04-21

## 2023-04-20 RX ORDER — INSULIN LISPRO 100/ML
4 VIAL (ML) SUBCUTANEOUS
Refills: 0 | Status: DISCONTINUED | OUTPATIENT
Start: 2023-04-20 | End: 2023-04-21

## 2023-04-20 RX ORDER — MORPHINE SULFATE 50 MG/1
1 CAPSULE, EXTENDED RELEASE ORAL ONCE
Refills: 0 | Status: DISCONTINUED | OUTPATIENT
Start: 2023-04-20 | End: 2023-04-20

## 2023-04-20 RX ORDER — ATORVASTATIN CALCIUM 80 MG/1
1 TABLET, FILM COATED ORAL
Refills: 0 | DISCHARGE

## 2023-04-20 RX ORDER — ASPIRIN/CALCIUM CARB/MAGNESIUM 324 MG
81 TABLET ORAL DAILY
Refills: 0 | Status: DISCONTINUED | OUTPATIENT
Start: 2023-04-20 | End: 2023-04-20

## 2023-04-20 RX ORDER — INSULIN LISPRO 100/ML
VIAL (ML) SUBCUTANEOUS
Refills: 0 | Status: DISCONTINUED | OUTPATIENT
Start: 2023-04-20 | End: 2023-04-20

## 2023-04-20 RX ORDER — LOSARTAN POTASSIUM 100 MG/1
100 TABLET, FILM COATED ORAL DAILY
Refills: 0 | Status: DISCONTINUED | OUTPATIENT
Start: 2023-04-20 | End: 2023-04-21

## 2023-04-20 RX ORDER — CARVEDILOL PHOSPHATE 80 MG/1
6.25 CAPSULE, EXTENDED RELEASE ORAL EVERY 12 HOURS
Refills: 0 | Status: DISCONTINUED | OUTPATIENT
Start: 2023-04-20 | End: 2023-04-20

## 2023-04-20 RX ADMIN — MORPHINE SULFATE 1 MILLIGRAM(S): 50 CAPSULE, EXTENDED RELEASE ORAL at 04:24

## 2023-04-20 RX ADMIN — ENOXAPARIN SODIUM 40 MILLIGRAM(S): 100 INJECTION SUBCUTANEOUS at 12:52

## 2023-04-20 RX ADMIN — INSULIN GLARGINE 40 UNIT(S): 100 INJECTION, SOLUTION SUBCUTANEOUS at 22:18

## 2023-04-20 RX ADMIN — Medication 1: at 12:55

## 2023-04-20 RX ADMIN — MORPHINE SULFATE 2 MILLIGRAM(S): 50 CAPSULE, EXTENDED RELEASE ORAL at 08:23

## 2023-04-20 RX ADMIN — MORPHINE SULFATE 1 MILLIGRAM(S): 50 CAPSULE, EXTENDED RELEASE ORAL at 03:54

## 2023-04-20 RX ADMIN — Medication 1: at 08:07

## 2023-04-20 RX ADMIN — Medication 1: at 17:09

## 2023-04-20 RX ADMIN — CARVEDILOL PHOSPHATE 12.5 MILLIGRAM(S): 80 CAPSULE, EXTENDED RELEASE ORAL at 17:51

## 2023-04-20 RX ADMIN — LOSARTAN POTASSIUM 100 MILLIGRAM(S): 100 TABLET, FILM COATED ORAL at 05:08

## 2023-04-20 RX ADMIN — Medication 81 MILLIGRAM(S): at 12:52

## 2023-04-20 RX ADMIN — Medication 650 MILLIGRAM(S): at 09:31

## 2023-04-20 RX ADMIN — Medication 10 MILLIGRAM(S): at 03:54

## 2023-04-20 RX ADMIN — Medication 4 UNIT(S): at 17:08

## 2023-04-20 RX ADMIN — MORPHINE SULFATE 2 MILLIGRAM(S): 50 CAPSULE, EXTENDED RELEASE ORAL at 08:01

## 2023-04-20 RX ADMIN — Medication 650 MILLIGRAM(S): at 10:02

## 2023-04-20 RX ADMIN — CARVEDILOL PHOSPHATE 6.25 MILLIGRAM(S): 80 CAPSULE, EXTENDED RELEASE ORAL at 05:08

## 2023-04-20 RX ADMIN — TRAMADOL HYDROCHLORIDE 50 MILLIGRAM(S): 50 TABLET ORAL at 02:30

## 2023-04-20 NOTE — H&P ADULT - HISTORY OF PRESENT ILLNESS
Patient is a 58 yo female with hx of DM, HTN, asthma presents with chest pain. Patient states that the pain is a "pressure sensation" in the middle of her chest ongoing since yesterday afternoon. Patient states pressure is constant 7/10 in severity with no radiation with no alleviating or aggravating factors. Patient denies any trauma, syncope or dizziness. Patient reports compliance with her medications and denies prior episodes. Associated symptoms were numbness to the left side of the face, which has now resolved. Patient reports blurry vision because she states her blood pressure is high. Patient denies abdominal pain, nausea or vomiting. Denies back pain, cough, shortness of breath. Denies fevers or chills. Patient states that her outpatient cardiologist is Dr. Hartman? Denies any stress test, angiogram or echocardiogram in the past.

## 2023-04-20 NOTE — PROGRESS NOTE ADULT - SUBJECTIVE AND OBJECTIVE BOX
PGY-1 Progress Note discussed with attending    PAGER #: [[phone]] TILL 5:00 PM  PLEASE CONTACT ON CALL TEAM:   - On Call Team (Please refer to Gene) FROM 5:00 PM - 8:30PM  - Nightfloat Team FROM 8:30 -7:30 AM    INTERVAL HPI/OVERNIGHT EVENTS:       REVIEW OF SYSTEMS:  CONSTITUTIONAL: No fever, weight loss, or fatigue  RESPIRATORY: No cough, wheezing, chills or hemoptysis; No shortness of breath  CARDIOVASCULAR: No chest pain, palpitations, dizziness, or leg swelling  GASTROINTESTINAL: No abdominal pain. No nausea, vomiting, or hematemesis; No diarrhea or constipation. No melena or hematochezia.  GENITOURINARY: No dysuria or hematuria, urinary frequency  NEUROLOGICAL: No headaches, memory loss, loss of strength, numbness, or tremors  SKIN: No itching, burning, rashes, or lesions     MEDICATIONS  (STANDING):  aspirin enteric coated 81 milliGRAM(s) Oral daily  atorvastatin 40 milliGRAM(s) Oral at bedtime  carvedilol 6.25 milliGRAM(s) Oral every 12 hours  enoxaparin Injectable 40 milliGRAM(s) SubCutaneous every 24 hours  insulin lispro (ADMELOG) corrective regimen sliding scale   SubCutaneous three times a day before meals  losartan 100 milliGRAM(s) Oral daily    MEDICATIONS  (PRN):  acetaminophen     Tablet .. 650 milliGRAM(s) Oral every 6 hours PRN Temp greater or equal to 38C (100.4F), Mild Pain (1 - 3), Moderate Pain (4 - 6)      Vital Signs Last 24 Hrs  T(C): 36.7 (20 Apr 2023 08:33), Max: 37.9 (20 Apr 2023 03:21)  T(F): 98.1 (20 Apr 2023 08:33), Max: 100.2 (20 Apr 2023 03:21)  HR: 70 (20 Apr 2023 08:33) (59 - 72)  BP: 160/88 (20 Apr 2023 08:33) (160/88 - 193/93)  BP(mean): 107 (19 Apr 2023 22:35) (107 - 107)  RR: 18 (20 Apr 2023 08:33) (12 - 18)  SpO2: 100% (20 Apr 2023 08:33) (98% - 100%)    Parameters below as of 20 Apr 2023 08:33  Patient On (Oxygen Delivery Method): room air        PHYSICAL EXAMINATION:  GENERAL: NAD, well built  HEAD:  Atraumatic, Normocephalic  EYES:  conjunctiva and sclera clear  NECK: Supple, No JVD, Normal thyroid  CHEST/LUNG: Clear to auscultation. Clear to percussion bilaterally; No rales, rhonchi, wheezing, or rubs  HEART: Regular rate and rhythm; No murmurs, rubs, or gallops  ABDOMEN: Soft, Nontender, Nondistended; Bowel sounds present  NERVOUS SYSTEM:  Alert & Oriented X3,    EXTREMITIES:  2+ Peripheral Pulses, No clubbing, cyanosis, or edema  SKIN: warm dry                          11.1   8.25  )-----------( 248      ( 20 Apr 2023 04:55 )             37.8     04-20    142  |  111<H>  |  16  ----------------------------<  174<H>  4.8   |  24  |  1.02    Ca    9.3      20 Apr 2023 04:55  Phos  3.4     04-20  Mg     2.2     04-20    TPro  7.6  /  Alb  3.4<L>  /  TBili  0.8  /  DBili  x   /  AST  8<L>  /  ALT  18  /  AlkPhos  102  04-20    LIVER FUNCTIONS - ( 20 Apr 2023 04:55 )  Alb: 3.4 g/dL / Pro: 7.6 g/dL / ALK PHOS: 102 U/L / ALT: 18 U/L DA / AST: 8 U/L / GGT: x                       I&O's Summary      CAPILLARY BLOOD GLUCOSE      POCT Blood Glucose.: 160 mg/dL (20 Apr 2023 08:02)    CAPILLARY BLOOD GLUCOSE      POCT Blood Glucose.: 160 mg/dL (20 Apr 2023 08:02)      RADIOLOGY & ADDITIONAL TESTS:                   PGY-1 Progress Note discussed with attending    PAGER #: [[phone]] TILL 5:00 PM  PLEASE CONTACT ON CALL TEAM:   - On Call Team (Please refer to Gene) FROM 5:00 PM - 8:30PM  - Nightfloat Team FROM 8:30 -7:30 AM    INTERVAL HPI/OVERNIGHT EVENTS:   Pt seen at bedside, appears comfortable eating breakfast. Pt states that her chest pain, blurry vision and headache improved after morphine in the ED. States her chest pain started yesterday noon and did not go away prompting her to go to the ED. States she is compliant with her anti-HTNsive meds.     REVIEW OF SYSTEMS:  CONSTITUTIONAL: No fever, weight loss, or fatigue  RESPIRATORY: No cough, wheezing, chills or hemoptysis; No shortness of breath  CARDIOVASCULAR: No chest pain, palpitations, dizziness, or leg swelling  GASTROINTESTINAL: No abdominal pain. No nausea, vomiting, or hematemesis; No diarrhea or constipation. No melena or hematochezia.  GENITOURINARY: No dysuria or hematuria, urinary frequency  NEUROLOGICAL: No headaches, memory loss, loss of strength, numbness, or tremors  SKIN: No itching, burning, rashes, or lesions     MEDICATIONS  (STANDING):  aspirin enteric coated 81 milliGRAM(s) Oral daily  atorvastatin 40 milliGRAM(s) Oral at bedtime  carvedilol 6.25 milliGRAM(s) Oral every 12 hours  enoxaparin Injectable 40 milliGRAM(s) SubCutaneous every 24 hours  insulin lispro (ADMELOG) corrective regimen sliding scale   SubCutaneous three times a day before meals  losartan 100 milliGRAM(s) Oral daily    MEDICATIONS  (PRN):  acetaminophen     Tablet .. 650 milliGRAM(s) Oral every 6 hours PRN Temp greater or equal to 38C (100.4F), Mild Pain (1 - 3), Moderate Pain (4 - 6)      Vital Signs Last 24 Hrs  T(C): 36.7 (20 Apr 2023 08:33), Max: 37.9 (20 Apr 2023 03:21)  T(F): 98.1 (20 Apr 2023 08:33), Max: 100.2 (20 Apr 2023 03:21)  HR: 70 (20 Apr 2023 08:33) (59 - 72)  BP: 160/88 (20 Apr 2023 08:33) (160/88 - 193/93)  BP(mean): 107 (19 Apr 2023 22:35) (107 - 107)  RR: 18 (20 Apr 2023 08:33) (12 - 18)  SpO2: 100% (20 Apr 2023 08:33) (98% - 100%)    Parameters below as of 20 Apr 2023 08:33  Patient On (Oxygen Delivery Method): room air        PHYSICAL EXAMINATION:  GENERAL: NAD, well built female  HEAD:  Atraumatic, Normocephalic  EYES:  conjunctiva and sclera clear  NECK: Supple, No JVD, Normal thyroid  CHEST/LUNG: Clear to auscultation. Clear to percussion bilaterally; No rales, rhonchi, wheezing, or rubs  HEART: Regular rate and rhythm; No murmurs, rubs, or gallops  ABDOMEN: Soft, Nontender, Nondistended; Bowel sounds present  NERVOUS SYSTEM:  Alert & Oriented X3  EXTREMITIES:  2+ Peripheral Pulses, No clubbing, cyanosis, or edema  SKIN: warm dry                          11.1   8.25  )-----------( 248      ( 20 Apr 2023 04:55 )             37.8     04-20    142  |  111<H>  |  16  ----------------------------<  174<H>  4.8   |  24  |  1.02    Ca    9.3      20 Apr 2023 04:55  Phos  3.4     04-20  Mg     2.2     04-20    TPro  7.6  /  Alb  3.4<L>  /  TBili  0.8  /  DBili  x   /  AST  8<L>  /  ALT  18  /  AlkPhos  102  04-20    LIVER FUNCTIONS - ( 20 Apr 2023 04:55 )  Alb: 3.4 g/dL / Pro: 7.6 g/dL / ALK PHOS: 102 U/L / ALT: 18 U/L DA / AST: 8 U/L / GGT: x                 CAPILLARY BLOOD GLUCOSE  POCT Blood Glucose.: 160 mg/dL (20 Apr 2023 08:02)      RADIOLOGY & ADDITIONAL TESTS:  < from: CT Head No Cont (04.20.23 @ 02:16) >    IMPRESSION:    No acute intracranial pathology.    --- End of Report ---      < from: Xray Chest 1 View- PORTABLE-Urgent (04.19.23 @ 22:03) >  IMPRESSION:   No radiographic evidence of active chest disease.    --- End of Report ---    < end of copied text >   PGY-1 Progress Note discussed with attending    PAGER #: [[phone]] TILL 5:00 PM  PLEASE CONTACT ON CALL TEAM:   - On Call Team (Please refer to Gene) FROM 5:00 PM - 8:30PM  - Nightfloat Team FROM 8:30 -7:30 AM    INTERVAL HPI/OVERNIGHT EVENTS:   Pt seen at bedside, appears comfortable eating breakfast. Pt states that her chest pain, blurry vision and headache improved after morphine in the ED. States her chest pain started yesterday noon and did not go away prompting her to go to the ED. States she is compliant with her anti-HTNsive meds.     REVIEW OF SYSTEMS:  CONSTITUTIONAL: No fever, weight loss, or fatigue  RESPIRATORY: No cough, wheezing, chills or hemoptysis; No shortness of breath  CARDIOVASCULAR: No chest pain, palpitations, dizziness, or leg swelling  GASTROINTESTINAL: No abdominal pain. No nausea, vomiting, or hematemesis; No diarrhea or constipation. No melena or hematochezia.  GENITOURINARY: No dysuria or hematuria, urinary frequency  NEUROLOGICAL: No headaches, memory loss, loss of strength, numbness, or tremors  SKIN: No itching, burning, rashes, or lesions     MEDICATIONS  (STANDING):  aspirin enteric coated 81 milliGRAM(s) Oral daily  atorvastatin 40 milliGRAM(s) Oral at bedtime  carvedilol 6.25 milliGRAM(s) Oral every 12 hours  enoxaparin Injectable 40 milliGRAM(s) SubCutaneous every 24 hours  insulin lispro (ADMELOG) corrective regimen sliding scale   SubCutaneous three times a day before meals  losartan 100 milliGRAM(s) Oral daily    MEDICATIONS  (PRN):  acetaminophen     Tablet .. 650 milliGRAM(s) Oral every 6 hours PRN Temp greater or equal to 38C (100.4F), Mild Pain (1 - 3), Moderate Pain (4 - 6)      Vital Signs Last 24 Hrs  T(C): 36.7 (20 Apr 2023 08:33), Max: 37.9 (20 Apr 2023 03:21)  T(F): 98.1 (20 Apr 2023 08:33), Max: 100.2 (20 Apr 2023 03:21)  HR: 70 (20 Apr 2023 08:33) (59 - 72)  BP: 160/88 (20 Apr 2023 08:33) (160/88 - 193/93)  BP(mean): 107 (19 Apr 2023 22:35) (107 - 107)  RR: 18 (20 Apr 2023 08:33) (12 - 18)  SpO2: 100% (20 Apr 2023 08:33) (98% - 100%)    Parameters below as of 20 Apr 2023 08:33  Patient On (Oxygen Delivery Method): room air        PHYSICAL EXAMINATION:  GENERAL: NAD, well built female  HEAD:  Atraumatic, Normocephalic  EYES:  conjunctiva and sclera clear  NECK: Supple, No JVD, Normal thyroid  CHEST/LUNG: Clear to auscultation. Clear to percussion bilaterally; No rales, rhonchi, wheezing, or rubs  HEART: Regular rate and rhythm; No murmurs, rubs, or gallops  ABDOMEN: Soft, Nontender, Nondistended; Bowel sounds present  NERVOUS SYSTEM:  Alert & Oriented X3  EXTREMITIES:  2+ Peripheral Pulses, No clubbing, cyanosis, or edema  SKIN: warm dry                          11.1   8.25  )-----------( 248      ( 20 Apr 2023 04:55 )             37.8     04-20    142  |  111<H>  |  16  ----------------------------<  174<H>  4.8   |  24  |  1.02    Ca    9.3      20 Apr 2023 04:55  Phos  3.4     04-20  Mg     2.2     04-20    TPro  7.6  /  Alb  3.4<L>  /  TBili  0.8  /  DBili  x   /  AST  8<L>  /  ALT  18  /  AlkPhos  102  04-20    LIVER FUNCTIONS - ( 20 Apr 2023 04:55 )  Alb: 3.4 g/dL / Pro: 7.6 g/dL / ALK PHOS: 102 U/L / ALT: 18 U/L DA / AST: 8 U/L / GGT: x                 CAPILLARY BLOOD GLUCOSE  POCT Blood Glucose.: 160 mg/dL (20 Apr 2023 08:02)      RADIOLOGY & ADDITIONAL TESTS:  < from: CT Head No Cont (04.20.23 @ 02:16) >    IMPRESSION:    No acute intracranial pathology.    --- End of Report ---      < from: Xray Chest 1 View- PORTABLE-Urgent (04.19.23 @ 22:03) >  IMPRESSION:   No radiographic evidence of active chest disease.    --- End of Report ---

## 2023-04-20 NOTE — H&P ADULT - PROBLEM SELECTOR PLAN 1
- patient presenting with chest pain  - EKG showing sinus bradycardia at 59bpm with no acute ischemic changes  - cardiac enzymes negative x2  - admit to telemetry for ACS r/o  - continue with coreg  - Aspirin and statin   - f/u echocardiogram  - f/u lipids and A1c   - Cardiology consult Dr. Carver - patient presenting with chest pain  - EKG showing sinus bradycardia at 59bpm with no acute ischemic changes  - cardiac enzymes negative x2  - admit to telemetry for ACS r/o  - continue with coreg  - Aspirin and statin   - f/u echocardiogram  - f/u lipids and A1c   - Cardiology consulted Dr. Carver

## 2023-04-20 NOTE — H&P ADULT - NSHPPHYSICALEXAM_GEN_ALL_CORE
PHYSICAL EXAM:  GENERAL: NAD, speaks in full sentences, no signs of respiratory distress  HEAD:  Atraumatic, Normocephalic  EYES: EOMI, PERRLA, conjunctiva and sclera clear  NECK: Supple  CHEST/LUNG: Clear to auscultation bilaterally; No wheeze; No crackles; No accessory muscles used  HEART: Regular rate and rhythm; No murmurs; gallops or rubs  ABDOMEN: Soft, Nontender, Nondistended; Bowel sounds present; No guarding  EXTREMITIES:  2+ Peripheral Pulses, No cyanosis or edema  PSYCH: AAOx3  NEUROLOGY: non-focal  SKIN: No rashes or lesions

## 2023-04-20 NOTE — PROGRESS NOTE ADULT - PROBLEM SELECTOR PLAN 2
- patient presenting with BP of 193/93  - associated left facial numbness  - CT head negative for acute pathologies  - admit to telemetry  - s/p hydralazine 10mg IV  - continue home medication of losartan and coreg  - DASH diet  - Cardiology Consulted Dr. Carver - patient presenting with BP of 193/93 associated left facial numbness, headache and blurry vision  - CT head negative for acute pathologies  - admit to telemetry  - s/p hydralazine 10mg IV  - continue home medication of losartan 100mg  - increased Coreg to 12.5mg BID, d/t elevated pressures  - DASH diet  - Cardiology Consulted Dr. Carver

## 2023-04-20 NOTE — CONSULT NOTE ADULT - ASSESSMENT
56 y/o F with PMH of DM, HTN, asthma p/w chest pain admitted for rule out ACS, cardiology consulted for further workup.

## 2023-04-20 NOTE — PATIENT PROFILE ADULT - FUNCTIONAL ASSESSMENT - DAILY ACTIVITY 3.
Form Request Documentation    Date Received in Clinic:    Name/Type of Form: [facility]  Questions that need to be addressed:   Current Employment Status: not currently working,    Amount of Leave Requested: Continuous Leave no return to work date   Other: None  Date Completed: 3/21/2018  Copy Mailed to patient: Yes on 3/21/2018  Disposition of Form: Fax to [facility] at [phone]     
denies pain/discomfort
4 = No assist / stand by assistance

## 2023-04-20 NOTE — PATIENT PROFILE ADULT - FALL HARM RISK - UNIVERSAL INTERVENTIONS
Bed in lowest position, wheels locked, appropriate side rails in place/Call bell, personal items and telephone in reach/Instruct patient to call for assistance before getting out of bed or chair/Non-slip footwear when patient is out of bed/[place] to call system/Physically safe environment - no spills, clutter or unnecessary equipment/Purposeful Proactive Rounding/Room/bathroom lighting operational, light cord in reach

## 2023-04-20 NOTE — H&P ADULT - PROBLEM SELECTOR PLAN 2
- patient presenting with BP of 193/93  - associated left facial numbness  - CT head negative for acute pathologies  - admit to telemetry  - s/p hydralazine 10mg IV  - continue home medication of losartan and coreg  - DASH diet  - Cardiology Consult Dr. Carver - patient presenting with BP of 193/93  - associated left facial numbness  - CT head negative for acute pathologies  - admit to telemetry  - s/p hydralazine 10mg IV  - continue home medication of losartan and coreg  - DASH diet  - Cardiology Consulted Dr. Carver

## 2023-04-20 NOTE — H&P ADULT - ATTENDING COMMENTS
This is a This is a 56 y/o female with PMHx of DM2 and HTN presenting with chief complaint of chest pressure x 1 day. She describes it as midsternal with a burning sensation that has been constant since around noon yesterday. She was sitting in a chair on her computer at time of onset. She also endorses bilateral arm weakness. No prior similar episodes. EKG without significant STTW abnormalities. Trop negative. Will admit to tele for chest pain, r/o ACS.    Vital Signs Last 24 Hrs  T(C): 36.5 (19 Apr 2023 19:36), Max: 36.5 (19 Apr 2023 19:36)  T(F): 97.7 (19 Apr 2023 19:36), Max: 97.7 (19 Apr 2023 19:36)  HR: 59 (19 Apr 2023 22:35) (59 - 65)  BP: 186/76 (19 Apr 2023 22:35) (186/76 - 193/93)  BP(mean): 107 (19 Apr 2023 22:35) (107 - 107)  RR: 12 (19 Apr 2023 22:35) (12 - 16)  SpO2: 100% (19 Apr 2023 22:35) (99% - 100%)    Parameters below as of 19 Apr 2023 19:36  Patient On (Oxygen Delivery Method): room air    PEx  as above    A/P:  #Chest pain  #DM2  #HTN  #HLD  #Prophylactic measure    - admit to tele  - trend cardiac enzymes  - TTE  - asa, statin, bblocker  - insulin  - check lipid panel, A1C  - cards eval  - dvt ppx

## 2023-04-20 NOTE — CONSULT NOTE ADULT - TIME BILLING
- Review of records, telemetry, vital signs and daily labs.   - General and cardiovascular physical examination.  - Generation of cardiovascular treatment plan.  - Coordination of care.      Patient was seen and examined by me on 4/20/23,interim events noted,labs and radiology studies reviewed.  Ollie Carver MD,FACC.  [address] [place], [place] [id number].  [phone]

## 2023-04-20 NOTE — H&P ADULT - ASSESSMENT
Patient is a 56 yo female with hx of DM, HTN, asthma presents with chest pain. Upon evaluation in ED, patient afebrile with BP of 193/93, hr of 65bpm and saturating well on room air. Labs showing normal cardiac enzymes with hgb of 10.6. EKG showing sinus bradycardia at 59bpm with no acute ischemic changes noted. CT head is negative for acute infarct or bleed. Patient admitted to telemetry for ACS r/o and hypertensive urgency.

## 2023-04-20 NOTE — CONSULT NOTE ADULT - PROBLEM SELECTOR RECOMMENDATION 3
pt admitted w hypertensive urgency s/p IV hydralazine 10 mg  takes losartan and coreg at home    - c/w losartan 100 mg qd, coreg 6.25 mg bid pt admitted w hypertensive urgency s/p IV hydralazine 10 mg  takes losartan and coreg at home    - c/w losartan, coreg

## 2023-04-20 NOTE — CONSULT NOTE ADULT - PROBLEM SELECTOR RECOMMENDATION 9
pt presents with chest pain  troponin wnl, EKG without signs of ischemia, LDL wnl  telemetry monitoring thus far without abnormality  pending TTE, stress test    - f/u stress test, TTE, A1c  - c/w coreg

## 2023-04-20 NOTE — PROGRESS NOTE ADULT - PROBLEM SELECTOR PLAN 3
- noted with hgb of 106 (baseline of 10-11)  - MCV of 59  - f/u iron studies - noted with hgb of 10.6 (baseline of 10-11)  - MCV of 59  - Iron low, iron saturation low, normal TIBC

## 2023-04-20 NOTE — ED PROVIDER NOTE - CLINICAL SUMMARY MEDICAL DECISION MAKING FREE TEXT BOX
58 y/o woman, h/o DM, HTN, asthma, c/o left sided chest pain, numbness to left face and b/l UE's, swelling in b/l ankles, starting about 12 pm (noon) today--labs, EKG, CXR, CT head, consider admission with high HEART score.

## 2023-04-20 NOTE — ED PROVIDER NOTE - OBJECTIVE STATEMENT
56 y/o woman, h/o DM, HTN, asthma, c/o left sided chest pain, numbness to left face and b/l UE's, swelling in b/l ankles, starting about 12 pm (noon) today.  No SOB/fever/cough/syncope.  No h/o DVT/PE/MI/CVA.  She does not feel like this is her asthma.

## 2023-04-20 NOTE — PROGRESS NOTE ADULT - PROBLEM SELECTOR PLAN 4
- noted to be on insulin at home   - SSI  - f/u A1c - noted to be on insulin at home (will confirm with pharmacy dosages)  - SSI  - a1c 11.7 - noted to be on insulin 50U at bedtime, farxiga. Recently stopped metformin due to reflux  - start 80% of home dose, Lantus 40U at bedtime  - start premeal coverage 4U TID, adjust as needed  - c/w ISS  - a1c 11.7

## 2023-04-20 NOTE — PROGRESS NOTE ADULT - PROBLEM SELECTOR PLAN 1
- patient presenting with chest pain  - EKG showing sinus bradycardia at 59bpm with no acute ischemic changes  - cardiac enzymes negative x2  - admit to telemetry for ACS r/o  - continue with coreg  - Aspirin and statin   - f/u echocardiogram  - f/u lipids and A1c   - Cardiology consulted Dr. Carver - patient presenting with chest pain  - EKG showing sinus bradycardia at 59bpm with no acute ischemic changes  - cardiac enzymes negative x2  - admitted to telemetry for ACS r/o  - a1c 11.7, lipid panel wnl  - nuclear stress test negative for reversible ischemia  - continue with coreg  - will dc ACS protocol ASA and statin due to negative stress test  - f/u echocardiogram  - Cardiology consulted Dr. Carver

## 2023-04-20 NOTE — CONSULT NOTE ADULT - SUBJECTIVE AND OBJECTIVE BOX
SUBJ:      MEDICATIONS  (STANDING):  aspirin enteric coated 81 milliGRAM(s) Oral daily  atorvastatin 40 milliGRAM(s) Oral at bedtime  carvedilol 6.25 milliGRAM(s) Oral every 12 hours  enoxaparin Injectable 40 milliGRAM(s) SubCutaneous every 24 hours  insulin lispro (ADMELOG) corrective regimen sliding scale   SubCutaneous three times a day before meals  losartan 100 milliGRAM(s) Oral daily    MEDICATIONS  (PRN):  acetaminophen     Tablet .. 650 milliGRAM(s) Oral every 6 hours PRN Temp greater or equal to 38C (100.4F), Mild Pain (1 - 3), Moderate Pain (4 - 6)    Vital Signs Last 24 Hrs  T(C): 36.7 (20 Apr 2023 08:33), Max: 37.9 (20 Apr 2023 03:21)  T(F): 98.1 (20 Apr 2023 08:33), Max: 100.2 (20 Apr 2023 03:21)  HR: 70 (20 Apr 2023 08:33) (59 - 72)  BP: 160/88 (20 Apr 2023 08:33) (160/88 - 193/93)  BP(mean): 107 (19 Apr 2023 22:35) (107 - 107)  RR: 18 (20 Apr 2023 08:33) (12 - 18)  SpO2: 100% (20 Apr 2023 08:33) (98% - 100%)    Parameters below as of 20 Apr 2023 08:33  Patient On (Oxygen Delivery Method): room air      REVIEW OF SYSTEMS:  CONSTITUTIONAL: No fever, weight loss, or fatigue  EYES: No eye pain, visual disturbances, or discharge  ENMT:  No difficulty hearing, tinnitus, vertigo; No sinus or throat pain  NECK: No pain or stiffness  RESPIRATORY: No cough, wheezing, chills or hemoptysis; No shortness of breath  CARDIOVASCULAR: No chest pain, palpitations, dizziness, or leg swelling  GASTROINTESTINAL: No abdominal or epigastric pain. No nausea, vomiting, or hematemesis; No diarrhea or constipation. No melena or hematochezia.  GENITOURINARY: No dysuria, frequency, hematuria, or incontinence  NEUROLOGICAL: No headaches, memory loss, loss of strength, numbness, or tremors  SKIN: No itching, burning, rashes, or lesions   LYMPH NODES: No enlarged glands  ENDOCRINE: No heat or cold intolerance; No hair loss  MUSCULOSKELETAL: No joint pain or swelling; No muscle, back, or extremity pain  PSYCHIATRIC: No depression, anxiety, mood swings, or difficulty sleeping  HEME/LYMPH: No easy bruising, or bleeding gums  ALLERY AND IMMUNOLOGIC: No hives or eczema    PHYSICAL EXAM:  GENERAL: NAD, speaks in full sentences, no signs of respiratory distress  HEAD:  Atraumatic, Normocephalic  EYES: EOMI, PERRLA, conjunctiva and sclera clear  NECK: Supple  CHEST/LUNG: Clear to auscultation bilaterally; No wheeze; No crackles; No accessory muscles used  HEART: Regular rate and rhythm; No murmurs; gallops or rubs  ABDOMEN: Soft, Nontender, Nondistended; Bowel sounds present; No guarding  EXTREMITIES:  2+ Peripheral Pulses, No cyanosis or edema  PSYCH: AAOx3  NEUROLOGY: non-focal  SKIN: No rashes or lesions    TELEMETRY: wnl    ECG: sinus gale @ 59, no ischemia    TTE: pending    LABS:                        11.1   8.25  )-----------( 248      ( 20 Apr 2023 04:55 )             37.8     04-20    142  |  111<H>  |  16  ----------------------------<  174<H>  4.8   |  24  |  1.02    Ca    9.3      20 Apr 2023 04:55  Phos  3.4     04-20  Mg     2.2     04-20    TPro  7.6  /  Alb  3.4<L>  /  TBili  0.8  /  DBili  x   /  AST  8<L>  /  ALT  18  /  AlkPhos  102  04-20          Creatinine Trend: 1.02<--, 1.19<--, 1.11<--, 1.11<--, 1.53<--  I&O's Summary    BNP  RADIOLOGY & ADDITIONAL STUDIES:    IMPRESSION AND PLAN:         SUBJ: Pt denies complaints this AM. Reported chest pain with bilateral arm numbness lastnight and headache overnight      MEDICATIONS  (STANDING):  aspirin enteric coated 81 milliGRAM(s) Oral daily  atorvastatin 40 milliGRAM(s) Oral at bedtime  carvedilol 6.25 milliGRAM(s) Oral every 12 hours  enoxaparin Injectable 40 milliGRAM(s) SubCutaneous every 24 hours  insulin lispro (ADMELOG) corrective regimen sliding scale   SubCutaneous three times a day before meals  losartan 100 milliGRAM(s) Oral daily    MEDICATIONS  (PRN):  acetaminophen     Tablet .. 650 milliGRAM(s) Oral every 6 hours PRN Temp greater or equal to 38C (100.4F), Mild Pain (1 - 3), Moderate Pain (4 - 6)    Vital Signs Last 24 Hrs  T(C): 36.7 (20 Apr 2023 08:33), Max: 37.9 (20 Apr 2023 03:21)  T(F): 98.1 (20 Apr 2023 08:33), Max: 100.2 (20 Apr 2023 03:21)  HR: 70 (20 Apr 2023 08:33) (59 - 72)  BP: 160/88 (20 Apr 2023 08:33) (160/88 - 193/93)  BP(mean): 107 (19 Apr 2023 22:35) (107 - 107)  RR: 18 (20 Apr 2023 08:33) (12 - 18)  SpO2: 100% (20 Apr 2023 08:33) (98% - 100%)    Parameters below as of 20 Apr 2023 08:33  Patient On (Oxygen Delivery Method): room air      REVIEW OF SYSTEMS:  CONSTITUTIONAL: No fever, weight loss, or fatigue  EYES: No eye pain, visual disturbances, or discharge  ENMT:  No difficulty hearing, tinnitus, vertigo; No sinus or throat pain  NECK: No pain or stiffness  RESPIRATORY: No cough, wheezing, chills or hemoptysis; No shortness of breath  CARDIOVASCULAR: No chest pain, palpitations, dizziness, or leg swelling  GASTROINTESTINAL: No abdominal or epigastric pain. No nausea, vomiting, or hematemesis; No diarrhea or constipation. No melena or hematochezia.  GENITOURINARY: No dysuria, frequency, hematuria, or incontinence  NEUROLOGICAL: No headaches, memory loss, loss of strength, numbness, or tremors  SKIN: No itching, burning, rashes, or lesions   LYMPH NODES: No enlarged glands  ENDOCRINE: No heat or cold intolerance; No hair loss  MUSCULOSKELETAL: No joint pain or swelling; No muscle, back, or extremity pain  PSYCHIATRIC: No depression, anxiety, mood swings, or difficulty sleeping  HEME/LYMPH: No easy bruising, or bleeding gums  ALLERY AND IMMUNOLOGIC: No hives or eczema    PHYSICAL EXAM:  GENERAL: NAD, speaks in full sentences, no signs of respiratory distress  HEAD:  Atraumatic, Normocephalic  EYES: EOMI, PERRLA, conjunctiva and sclera clear  NECK: Supple  CHEST/LUNG: Clear to auscultation bilaterally; No wheeze; No crackles; No accessory muscles used  HEART: Regular rate and rhythm; No murmurs; gallops or rubs  ABDOMEN: Soft, Nontender, Nondistended; Bowel sounds present; No guarding  EXTREMITIES:  2+ Peripheral Pulses, No cyanosis or edema  PSYCH: AAOx3  NEUROLOGY: non-focal  SKIN: No rashes or lesions    TELEMETRY: wnl    ECG: sinus gale @ 59, no ischemia    TTE: pending    LABS:                        11.1   8.25  )-----------( 248      ( 20 Apr 2023 04:55 )             37.8     04-20    142  |  111<H>  |  16  ----------------------------<  174<H>  4.8   |  24  |  1.02    Ca    9.3      20 Apr 2023 04:55  Phos  3.4     04-20  Mg     2.2     04-20    TPro  7.6  /  Alb  3.4<L>  /  TBili  0.8  /  DBili  x   /  AST  8<L>  /  ALT  18  /  AlkPhos  102  04-20          Creatinine Trend: 1.02<--, 1.19<--, 1.11<--, 1.11<--, 1.53<--  I&O's Summary    BNP  RADIOLOGY & ADDITIONAL STUDIES:    IMPRESSION AND PLAN:

## 2023-04-21 VITALS
SYSTOLIC BLOOD PRESSURE: 168 MMHG | HEART RATE: 79 BPM | TEMPERATURE: 99 F | RESPIRATION RATE: 16 BRPM | OXYGEN SATURATION: 98 % | DIASTOLIC BLOOD PRESSURE: 84 MMHG

## 2023-04-21 LAB
ANION GAP SERPL CALC-SCNC: 9 MMOL/L — SIGNIFICANT CHANGE UP (ref 5–17)
BUN SERPL-MCNC: 27 MG/DL — HIGH (ref 7–18)
CALCIUM SERPL-MCNC: 9 MG/DL — SIGNIFICANT CHANGE UP (ref 8.4–10.5)
CHLORIDE SERPL-SCNC: 109 MMOL/L — HIGH (ref 96–108)
CO2 SERPL-SCNC: 24 MMOL/L — SIGNIFICANT CHANGE UP (ref 22–31)
CREAT SERPL-MCNC: 1.08 MG/DL — SIGNIFICANT CHANGE UP (ref 0.5–1.3)
EGFR: 60 ML/MIN/1.73M2 — SIGNIFICANT CHANGE UP
GLUCOSE BLDC GLUCOMTR-MCNC: 111 MG/DL — HIGH (ref 70–99)
GLUCOSE BLDC GLUCOMTR-MCNC: 127 MG/DL — HIGH (ref 70–99)
GLUCOSE BLDC GLUCOMTR-MCNC: 285 MG/DL — HIGH (ref 70–99)
GLUCOSE BLDC GLUCOMTR-MCNC: 299 MG/DL — HIGH (ref 70–99)
GLUCOSE SERPL-MCNC: 143 MG/DL — HIGH (ref 70–99)
HCT VFR BLD CALC: 34.4 % — LOW (ref 34.5–45)
HCV AB S/CO SERPL IA: 0.18 S/CO — SIGNIFICANT CHANGE UP (ref 0–0.99)
HCV AB SERPL-IMP: SIGNIFICANT CHANGE UP
HGB BLD-MCNC: 10.2 G/DL — LOW (ref 11.5–15.5)
MAGNESIUM SERPL-MCNC: 2.2 MG/DL — SIGNIFICANT CHANGE UP (ref 1.6–2.6)
MCHC RBC-ENTMCNC: 17.6 PG — LOW (ref 27–34)
MCHC RBC-ENTMCNC: 29.7 GM/DL — LOW (ref 32–36)
MCV RBC AUTO: 59.2 FL — LOW (ref 80–100)
NRBC # BLD: 0 /100 WBCS — SIGNIFICANT CHANGE UP (ref 0–0)
PHOSPHATE SERPL-MCNC: 3.5 MG/DL — SIGNIFICANT CHANGE UP (ref 2.5–4.5)
PLATELET # BLD AUTO: 229 K/UL — SIGNIFICANT CHANGE UP (ref 150–400)
POTASSIUM SERPL-MCNC: 3.8 MMOL/L — SIGNIFICANT CHANGE UP (ref 3.5–5.3)
POTASSIUM SERPL-SCNC: 3.8 MMOL/L — SIGNIFICANT CHANGE UP (ref 3.5–5.3)
RBC # BLD: 5.81 M/UL — HIGH (ref 3.8–5.2)
RBC # FLD: 17.8 % — HIGH (ref 10.3–14.5)
SODIUM SERPL-SCNC: 142 MMOL/L — SIGNIFICANT CHANGE UP (ref 135–145)
WBC # BLD: 8.43 K/UL — SIGNIFICANT CHANGE UP (ref 3.8–10.5)
WBC # FLD AUTO: 8.43 K/UL — SIGNIFICANT CHANGE UP (ref 3.8–10.5)

## 2023-04-21 PROCEDURE — 70450 CT HEAD/BRAIN W/O DYE: CPT | Mod: MA

## 2023-04-21 PROCEDURE — 83540 ASSAY OF IRON: CPT

## 2023-04-21 PROCEDURE — 80061 LIPID PANEL: CPT

## 2023-04-21 PROCEDURE — 84100 ASSAY OF PHOSPHORUS: CPT

## 2023-04-21 PROCEDURE — 83735 ASSAY OF MAGNESIUM: CPT

## 2023-04-21 PROCEDURE — 82962 GLUCOSE BLOOD TEST: CPT

## 2023-04-21 PROCEDURE — 80048 BASIC METABOLIC PNL TOTAL CA: CPT

## 2023-04-21 PROCEDURE — A9502: CPT

## 2023-04-21 PROCEDURE — 87635 SARS-COV-2 COVID-19 AMP PRB: CPT

## 2023-04-21 PROCEDURE — 93306 TTE W/DOPPLER COMPLETE: CPT

## 2023-04-21 PROCEDURE — 85027 COMPLETE CBC AUTOMATED: CPT

## 2023-04-21 PROCEDURE — 80053 COMPREHEN METABOLIC PANEL: CPT

## 2023-04-21 PROCEDURE — 83880 ASSAY OF NATRIURETIC PEPTIDE: CPT

## 2023-04-21 PROCEDURE — 83036 HEMOGLOBIN GLYCOSYLATED A1C: CPT

## 2023-04-21 PROCEDURE — 83550 IRON BINDING TEST: CPT

## 2023-04-21 PROCEDURE — 99239 HOSP IP/OBS DSCHRG MGMT >30: CPT

## 2023-04-21 PROCEDURE — 84484 ASSAY OF TROPONIN QUANT: CPT

## 2023-04-21 PROCEDURE — 99285 EMERGENCY DEPT VISIT HI MDM: CPT | Mod: 25

## 2023-04-21 PROCEDURE — 86803 HEPATITIS C AB TEST: CPT

## 2023-04-21 PROCEDURE — 82728 ASSAY OF FERRITIN: CPT

## 2023-04-21 PROCEDURE — 71045 X-RAY EXAM CHEST 1 VIEW: CPT

## 2023-04-21 PROCEDURE — 85025 COMPLETE CBC W/AUTO DIFF WBC: CPT

## 2023-04-21 PROCEDURE — 93017 CV STRESS TEST TRACING ONLY: CPT

## 2023-04-21 PROCEDURE — 36415 COLL VENOUS BLD VENIPUNCTURE: CPT

## 2023-04-21 PROCEDURE — 93005 ELECTROCARDIOGRAM TRACING: CPT

## 2023-04-21 PROCEDURE — 78452 HT MUSCLE IMAGE SPECT MULT: CPT

## 2023-04-21 RX ORDER — INSULIN ASPART 100 [IU]/ML
4 INJECTION, SOLUTION INTRAVENOUS; SUBCUTANEOUS
Qty: 2 | Refills: 0 | DISCHARGE
Start: 2023-04-21 | End: 2023-05-20

## 2023-04-21 RX ORDER — CARVEDILOL PHOSPHATE 80 MG/1
1 CAPSULE, EXTENDED RELEASE ORAL
Qty: 0 | Refills: 0 | DISCHARGE
Start: 2023-04-21

## 2023-04-21 RX ORDER — INSULIN GLARGINE-YFGN 100 [IU]/ML
38 INJECTION, SOLUTION SUBCUTANEOUS
Qty: 4 | Refills: 0 | DISCHARGE
Start: 2023-04-21 | End: 2023-05-20

## 2023-04-21 RX ORDER — INSULIN LISPRO 100/ML
6 VIAL (ML) SUBCUTANEOUS
Qty: 2 | Refills: 0
Start: 2023-04-21 | End: 2023-05-20

## 2023-04-21 RX ORDER — CARVEDILOL PHOSPHATE 80 MG/1
1 CAPSULE, EXTENDED RELEASE ORAL
Qty: 60 | Refills: 0
Start: 2023-04-21 | End: 2023-05-20

## 2023-04-21 RX ORDER — INSULIN LISPRO 100/ML
0 VIAL (ML) SUBCUTANEOUS
Refills: 0 | DISCHARGE

## 2023-04-21 RX ORDER — INSULIN GLARGINE 100 [IU]/ML
50 INJECTION, SOLUTION SUBCUTANEOUS
Refills: 0 | DISCHARGE

## 2023-04-21 RX ORDER — CARVEDILOL PHOSPHATE 80 MG/1
1 CAPSULE, EXTENDED RELEASE ORAL
Refills: 0 | DISCHARGE

## 2023-04-21 RX ORDER — INSULIN GLARGINE 100 [IU]/ML
70 INJECTION, SOLUTION SUBCUTANEOUS
Refills: 0 | DISCHARGE

## 2023-04-21 RX ORDER — INSULIN GLARGINE 100 [IU]/ML
40 INJECTION, SOLUTION SUBCUTANEOUS
Qty: 4 | Refills: 0
Start: 2023-04-21 | End: 2023-05-20

## 2023-04-21 RX ORDER — INSULIN ASPART 100 [IU]/ML
6 INJECTION, SOLUTION SUBCUTANEOUS
Qty: 2 | Refills: 0
Start: 2023-04-21 | End: 2023-05-20

## 2023-04-21 RX ORDER — DAPAGLIFLOZIN 10 MG/1
1 TABLET, FILM COATED ORAL
Refills: 0 | DISCHARGE

## 2023-04-21 RX ADMIN — Medication 4 UNIT(S): at 11:59

## 2023-04-21 RX ADMIN — TRAMADOL HYDROCHLORIDE 50 MILLIGRAM(S): 50 TABLET ORAL at 03:00

## 2023-04-21 RX ADMIN — LOSARTAN POTASSIUM 100 MILLIGRAM(S): 100 TABLET, FILM COATED ORAL at 06:36

## 2023-04-21 RX ADMIN — CARVEDILOL PHOSPHATE 12.5 MILLIGRAM(S): 80 CAPSULE, EXTENDED RELEASE ORAL at 06:18

## 2023-04-21 RX ADMIN — CARVEDILOL PHOSPHATE 12.5 MILLIGRAM(S): 80 CAPSULE, EXTENDED RELEASE ORAL at 17:22

## 2023-04-21 RX ADMIN — Medication 3: at 11:59

## 2023-04-21 RX ADMIN — Medication 4 UNIT(S): at 07:30

## 2023-04-21 RX ADMIN — Medication 650 MILLIGRAM(S): at 17:22

## 2023-04-21 NOTE — DISCHARGE NOTE NURSING/CASE MANAGEMENT/SOCIAL WORK - NSDCPEFALRISK_GEN_ALL_CORE
For information on Fall & Injury Prevention, visit: [email or web address] OR  [email or web address] OR  https://www.cdc.gov/steadi/patient.html

## 2023-04-21 NOTE — PROGRESS NOTE ADULT - PROBLEM SELECTOR PLAN 1
- patient presenting with chest pain  - EKG showing sinus bradycardia at 59bpm with no acute ischemic changes  - cardiac enzymes negative x2  - admitted to telemetry for ACS r/o  - a1c 11.7, lipid panel wnl  - nuclear stress test negative for reversible ischemia  - continue with coreg  - will dc ACS protocol ASA and statin due to negative stress test  - f/u echocardiogram  - Cardiology consulted Dr. Carver - patient presenting with chest pain  - EKG showing sinus bradycardia at 59bpm with no acute ischemic changes  - cardiac enzymes negative x2  - admitted to telemetry for ACS r/o & HTNsive crisis  - a1c 11.7, lipid panel wnl  - nuclear stress test negative for reversible ischemia  - DCed ACS protocol ASA and statin due to negative stress test  - continue with increased Coreg dose 12.5mg BID  - f/u echocardiogram  - Cardiology Dr. Carver following

## 2023-04-21 NOTE — DISCHARGE NOTE PROVIDER - HOSPITAL COURSE
56 yo female with hx of DM, HTN, asthma presents with chest pain. Patient states that the pain is a "pressure sensation" in the middle of her chest ongoing since yesterday afternoon. Patient states pressure is constant 7/10 in severity with no radiation with no alleviating or aggravating factors. Patient denies any trauma, syncope or dizziness. Patient reports compliance with her medications and denies prior episodes. Associated symptoms were numbness to the left side of the face, which has now resolved. Patient reports blurry vision because she states her blood pressure is high. Patient denies abdominal pain, nausea or vomiting. Denies back pain, cough, shortness of breath. Denies fevers or chills. Patient states that her outpatient cardiologist is Dr. Hartman. Denies any stress test, angiogram or echocardiogram in the past.     Pt admitted to telemetry for hypertensive crisis. /93 on admission. Given IV Hydralazine 10mg in ED. Pt states her chest pain resolved with morphine in the ED.     Problem/Plan - 1:  ·  Problem: Chest pain.   ·  Plan: - patient presenting with chest pain  - EKG showing sinus bradycardia at 59bpm with no acute ischemic changes  - cardiac enzymes negative x2  - admitted to telemetry for ACS r/o  - a1c 11.7, lipid panel wnl  - nuclear stress test negative for reversible ischemia  - continue with coreg  - will dc ACS protocol ASA and statin due to negative stress test  - f/u echocardiogram  - Cardiology consulted Dr. Carver.     Problem/Plan - 2:  ·  Problem: Hypertensive crisis.   ·  Plan: - patient presenting with BP of 193/93 associated left facial numbness, headache and blurry vision  - CT head negative for acute pathologies  - admit to telemetry  - s/p hydralazine 10mg IV  - continue home medication of losartan 100mg  - increased Coreg to 12.5mg BID, d/t elevated pressures  - DASH diet  - Cardiology Consulted Dr. Carver.     Problem/Plan - 3:  ·  Problem: Anemia.   ·  Plan: - noted with hgb of 10.6 (baseline of 10-11)  - MCV of 59  - Iron low, iron saturation low, normal TIBC.     Problem/Plan - 4:  ·  Problem: DM (diabetes mellitus).   ·  Plan: - noted to be on insulin 50U at bedtime, farxiga. Recently stopped metformin due to reflux  - start 80% of home dose, Lantus 40U at bedtime  - start premeal coverage 4U TID, adjust as needed  - c/w ISS  - a1c 11.7.     Problem/Plan - 5:  ·  Problem: Prophylactic measure.   ·  Plan: - Lovenox for DVT prophylaxis.   56 yo female with hx of DM, HTN, asthma presents with chest pain. Patient states that the pain is a "pressure sensation" in the middle of her chest ongoing since yesterday afternoon. Patient states pressure is constant 7/10 in severity with no radiation with no alleviating or aggravating factors. Patient denies any trauma, syncope or dizziness. Patient reports compliance with her medications and denies prior episodes. Associated symptoms were numbness to the left side of the face, which has now resolved. Patient reports blurry vision because she states her blood pressure is high. Patient denies abdominal pain, nausea or vomiting. Denies back pain, cough, shortness of breath. Denies fevers or chills. Patient states that her outpatient cardiologist is Dr. Hartman. Denies any stress test, angiogram or echocardiogram in the past.     Pt admitted to telemetry for hypertensive crisis. /93 on admission. Given IV Hydralazine 10mg in ED. Pt states her chest pain resolved with morphine in the ED. EKG showing sinus bradycardia at 59bpm with no acute ischemic changes. cardiac enzymes negative x2. a1c 11.7, lipid panel wnl. nuclear stress test negative for reversible ischemia. continue with increased dose of coreg 12.5mg BID. will dc ACS protocol ASA and statin due to negative stress test. f/u echocardiogram results. Cardiology consulted Dr. Carver. patient presenting with BP of 193/93 associated left facial numbness, headache and blurry vision. Signs of hypertensive crisis. CT head negative for acute pathologies. continue home medication of losartan 100mg. increased Coreg to 12.5mg BID, d/t elevated pressures. DASH diet. Cardiology Consulted Dr. Carver. noted with hgb of 10.6 (baseline of 10-11). MCV of 59  Iron low, iron saturation low, normal TIBC. Hx of DM2 noted to be on insulin 50U at bedtime, farxiga. Recently stopped metformin due to reflux. start 80% of home dose, Lantus 40U at bedtime. start premeal coverage 4U TID, adjust as needed. c/w ISS. Lovenox for DVT prophylaxis.    Patient is medically stable and ready for discharge.  This is only a brief summary. Please refer to the chart for the full hospital course.   56 yo female with hx of DM, HTN, asthma presents with chest pain. Patient states that the pain is a "pressure sensation" in the middle of her chest ongoing since yesterday afternoon. Patient states pressure is constant 7/10 in severity with no radiation with no alleviating or aggravating factors. Patient denies any trauma, syncope or dizziness. Patient reports compliance with her medications and denies prior episodes. Associated symptoms were numbness to the left side of the face, which has now resolved. Patient reports blurry vision because she states her blood pressure is high. Patient denies abdominal pain, nausea or vomiting. Denies back pain, cough, shortness of breath. Denies fevers or chills. Patient states that her outpatient cardiologist is Dr. Hartman. Denies any stress test, angiogram or echocardiogram in the past.     Pt admitted to telemetry for hypertensive crisis. /93 on admission. Given IV Hydralazine 10mg in ED. Pt states her chest pain resolved with morphine in the ED. EKG showing sinus bradycardia at 59bpm with no acute ischemic changes. cardiac enzymes negative x2. a1c 11.7, lipid panel wnl. nuclear stress test negative for reversible ischemia. continue with increased dose of coreg 12.5mg BID. will dc ACS protocol ASA and statin due to negative stress test. f/u echocardiogram results. Cardiology consulted Dr. Carver. patient presenting with BP of 193/93 associated left facial numbness, headache and blurry vision. Signs of hypertensive crisis. CT head negative for acute pathologies. continue home medication of losartan 100mg. increased Coreg to 12.5mg BID, d/t elevated pressures. DASH diet. Cardiology Consulted Dr. Carver. noted with hgb of 10.6 (baseline of 10-11). MCV of 59  Iron low, iron saturation low, normal TIBC. Hx of DM2 noted to be on insulin 50U at bedtime, farxiga. Recently stopped metformin due to reflux. started 80% of home dose, Lantus 40U at bedtime. start premeal coverage 4U TID, and Admelog 4U TID premeals. adjust as needed. c/w ISS. Sugars were elevated after meal. Will DC on Basalglar 40U, Admelog 6U TID premeals and Farxiga. Lovenox for DVT prophylaxis.    Patient is medically stable and ready for discharge.  This is only a brief summary. Please refer to the chart for the full hospital course.   58 yo female with hx of DM, HTN, asthma presents with chest pain. Patient states that the pain is a "pressure sensation" in the middle of her chest ongoing since yesterday afternoon. Patient states pressure is constant 7/10 in severity with no radiation with no alleviating or aggravating factors. Patient denies any trauma, syncope or dizziness. Patient reports compliance with her medications and denies prior episodes. Associated symptoms were numbness to the left side of the face, which has now resolved. Patient reports blurry vision because she states her blood pressure is high. Patient denies abdominal pain, nausea or vomiting. Denies back pain, cough, shortness of breath. Denies fevers or chills. Patient states that her outpatient cardiologist is Dr. Hartman. Denies any stress test, angiogram or echocardiogram in the past.     Pt admitted to telemetry for hypertensive crisis. /93 on admission. Given IV Hydralazine 10mg in ED. Pt states her chest pain resolved with morphine in the ED. EKG showing sinus bradycardia at 59bpm with no acute ischemic changes. cardiac enzymes negative x2. a1c 11.7, lipid panel wnl. nuclear stress test negative for reversible ischemia. continue with increased dose of coreg 12.5mg BID. will dc ACS protocol ASA and statin due to negative stress test. f/u echocardiogram results. Cardiology consulted Dr. Carver. patient presenting with BP of 193/93 associated left facial numbness, headache and blurry vision. Signs of hypertensive crisis. CT head negative for acute pathologies. continue home medication of losartan 100mg. increased Coreg to 12.5mg BID, d/t elevated pressures. DASH diet. Cardiology Consulted Dr. Carver. noted with hgb of 10.6 (baseline of 10-11). MCV of 59  Iron low, iron saturation low, normal TIBC. Hx of DM2 noted to be on insulin 50U at bedtime, farxiga. Recently stopped metformin due to reflux. started 80% of home dose, Lantus 40U at bedtime. start premeal coverage 4U TID, and Novolog 4U TID premeals (admelog not covered by insurance). adjust as needed. c/w ISS. Sugars were elevated after meal. Will DC on Basalglar 40U, Admelog 6U TID premeals and Farxiga. Lovenox for DVT prophylaxis.    Patient is medically stable and ready for discharge.  This is only a brief summary. Please refer to the chart for the full hospital course.   56 yo female with hx of DM, HTN, asthma presents with chest pain. Patient states that the pain is a "pressure sensation" in the middle of her chest ongoing since yesterday afternoon. Patient states pressure is constant 7/10 in severity with no radiation with no alleviating or aggravating factors. Patient denies any trauma, syncope or dizziness. Patient reports compliance with her medications and denies prior episodes. Associated symptoms were numbness to the left side of the face, which has now resolved. Patient reports blurry vision because she states her blood pressure is high. Patient denies abdominal pain, nausea or vomiting. Denies back pain, cough, shortness of breath. Denies fevers or chills. Patient states that her outpatient cardiologist is Dr. Hartman. Denies any stress test, angiogram or echocardiogram in the past.     Pt admitted to telemetry for hypertensive crisis. /93 on admission. Given IV Hydralazine 10mg in ED. Pt states her chest pain resolved with morphine in the ED. EKG showing sinus bradycardia at 59bpm with no acute ischemic changes. cardiac enzymes negative x2. a1c 11.7, lipid panel wnl. nuclear stress test negative for reversible ischemia. continue with increased dose of coreg 12.5mg BID. will dc ACS protocol ASA and statin due to negative stress test. Echo showed grade I diastolic dysfunction, normal EF. Cardiology consulted Dr. Carver. patient presenting with BP of 193/93 associated left facial numbness, headache and blurry vision. Signs of hypertensive crisis. CT head negative for acute pathologies. continue home medication of losartan 100mg. increased Coreg to 12.5mg BID, d/t elevated pressures. DASH diet. Cardiology Consulted Dr. Carver. noted with hgb of 10.6 (baseline of 10-11). MCV of 59. Iron low, iron saturation low, normal TIBC. Hx of DM2 noted to be on insulin 50U at bedtime, farxiga. Recently stopped metformin due to reflux. started 80% of home dose, Lantus 40U at bedtime. start premeal coverage 4U TID, and Novolog 4U TID premeals (admelog not covered by insurance). adjust as needed. c/w ISS. Sugars were elevated after meal. Will DC on Basalglar 40U, Admelog 6U TID premeals and Farxiga. Lovenox for DVT prophylaxis.    Patient is medically stable and ready for discharge.  This is only a brief summary. Please refer to the chart for the full hospital course.   56 yo female with hx of DM, HTN, asthma presents with chest pain. Patient states that the pain is a "pressure sensation" in the middle of her chest ongoing since yesterday afternoon. Patient states pressure is constant 7/10 in severity with no radiation with no alleviating or aggravating factors. Patient denies any trauma, syncope or dizziness. Patient reports compliance with her medications and denies prior episodes. Associated symptoms were numbness to the left side of the face, which has now resolved. Patient reports blurry vision because she states her blood pressure is high. Patient denies abdominal pain, nausea or vomiting. Denies back pain, cough, shortness of breath. Denies fevers or chills. Patient states that her outpatient cardiologist is Dr. Hartman. Denies any stress test, angiogram or echocardiogram in the past.     Pt admitted to telemetry for hypertensive crisis. /93 on admission. Given IV Hydralazine 10mg in ED. Pt states her chest pain resolved with morphine in the ED. EKG showing sinus bradycardia at 59bpm with no acute ischemic changes. cardiac enzymes negative x2. a1c 11.7, lipid panel wnl. nuclear stress test negative for reversible ischemia. continue with increased dose of coreg 12.5mg BID. will dc ACS protocol ASA and statin due to negative stress test. Echo showed grade I diastolic dysfunction, normal EF. Cardiology consulted Dr. Carver. patient presenting with BP of 193/93 associated left facial numbness, headache and blurry vision. Signs of hypertensive crisis. CT head negative for acute pathologies. continue home medication of losartan 100mg. increased Coreg to 12.5mg BID, d/t elevated pressures. DASH diet. Cardiology Consulted Dr. Carver. noted with hgb of 10.6 (baseline of 10-11). MCV of 59. Iron low, iron saturation low, normal TIBC. Hx of DM2 noted to be on insulin 50U at bedtime, farxiga. Recently stopped metformin due to reflux. started 80% of home dose, Lantus 40U at bedtime. start premeal coverage Novolog 4U TID premeals (admelog not covered by insurance). adjust as needed. c/w ISS. Sugars were elevated after meal. Will DC on Basalglar 40U, Admelog 6U TID premeals and Farxiga. Lovenox for DVT prophylaxis.    Patient is medically stable and ready for discharge.  This is only a brief summary. Please refer to the chart for the full hospital course.

## 2023-04-21 NOTE — DISCHARGE NOTE PROVIDER - CARE PROVIDER_API CALL
Cindy Heredia)  Internal Medicine  [address] [facility], [address] [place] [address]  Phone: [phone]  Fax: [phone]  Follow Up Time: 1 week    James Reeves  INTERNAL MEDICINE  [address] [address]  Phone: [phone]  Fax: [phone]  Follow Up Time: 1 week

## 2023-04-21 NOTE — DIETITIAN INITIAL EVALUATION ADULT - PERTINENT LABORATORY DATA
04-21    142  |  109<H>  |  27<H>  ----------------------------<  143<H>  3.8   |  24  |  1.08    Ca    9.0      21 Apr 2023 05:35  Phos  3.5     04-21  Mg     2.2     04-21    TPro  7.6  /  Alb  3.4<L>  /  TBili  0.8  /  DBili  x   /  AST  8<L>  /  ALT  18  /  AlkPhos  102  04-20  POCT Blood Glucose.: 299 mg/dL (04-21-23 @ 10:24)  A1C with Estimated Average Glucose Result: 11.7 % (04-20-23 @ 04:55)

## 2023-04-21 NOTE — PROGRESS NOTE ADULT - PROBLEM SELECTOR PLAN 2
- patient presenting with BP of 193/93 associated left facial numbness, headache and blurry vision  - CT head negative for acute pathologies  - admit to telemetry  - s/p hydralazine 10mg IV  - continue home medication of losartan 100mg  - increased Coreg to 12.5mg BID, d/t elevated pressures  - DASH diet  - Cardiology Consulted Dr. Carver - patient presenting with BP of 193/93 associated left facial numbness, headache and blurry vision  - CT head negative for acute pathologies  - s/p hydralazine 10mg IV in ED  - c/w losartan 100mg  - c/w Coreg 12.5mg BID, BP under better control  - DASH diet  - Cardiology Dr. Carver following  - Resolved*

## 2023-04-21 NOTE — PROGRESS NOTE ADULT - ATTENDING COMMENTS
Patient blood pressure improved. Uncontrolled sugars, start on nutritional insulin. Stress test negative. Discussed with Dr. Carver. Discharge home today.
Suspect symptoms most likely related to hypertensive crisis. Increase carvedilol, blood pressure improved. hgbA1c elevated to 11.7, needs nutritional insulin. Diabetes teaching, nutrition consult. Stress test negative, echo normal per Dr. Carver. Discharge when BP improved.

## 2023-04-21 NOTE — DISCHARGE NOTE PROVIDER - NSDCCPCAREPLAN_GEN_ALL_CORE_FT
PRINCIPAL DISCHARGE DIAGNOSIS  Diagnosis: Hypertensive crisis  Assessment and Plan of Treatment: You came to the hospital with chest pain You have high blood pressure, for which you have been started on medications. It is important to continue to take your medications on time,  monitor your blood pressure at home, keep a log of your home readings and follow up with your Primary Care Physician. As per AHA/ACC guidelines, it is important to adhere to a DASH Diet of fresh fruits, vegetables, lean meats such as poultry and fish, and whole wheat carbs. 30 minutes of aerobic exercise per day 3-4 times a week, reducing salt intake <1.5g/day, and cutting down on highly processed foods are also shown to reduce BP. Uncontrolled BP may result in organ damage to your eyes, brain, heart, and kidneys by causing strokes, heart attacks, kidney failure, and bleeds in your eye.        SECONDARY DISCHARGE DIAGNOSES  Diagnosis: DM (diabetes mellitus)  Assessment and Plan of Treatment: Continue with your blood sugar medication. HbA1C on admission was 11.7 which is elevated. This is a reflection of your blood sugar level over the last 3 months.  Please check your blood sugar levels three times daily before meals. Keep a record of your blood sugar readings.  You must maintain a healthy diet that consists of low sugar and low fat. Your diet must consist of mostly vegetables. Please limit your intake of high sugar and high carbohydrate foods such as pasta, rice, and bread. Exercise frequently if possible. Follow up with primary care physician within one week of your discharge to further manage your diabetes and monitor your Hemoglobin A1c levels after 3 months to evaluate your diabetes control.     PRINCIPAL DISCHARGE DIAGNOSIS  Diagnosis: Hypertensive crisis  Assessment and Plan of Treatment: You came to the hospital with chest pain, blurry vision and headache. Your systolic blood pressure was >180 on admission. We gave you IV blood pressure medication in the emergency room to bring it down. Cardiologist, Dr. Carver was consulted. Stress test was negative for any abnormalities. You have a history of high blood pressure, for which you have been on medications. We INCREASED your Coreg to 12.5mg twice a day. It is important to continue to take your medications on time, monitor your blood pressure at home, keep a log of your home readings and follow up with your Primary Care Physician. As per AHA/ACC guidelines, it is important to adhere to a DASH Diet of fresh fruits, vegetables, lean meats such as poultry and fish, and whole wheat carbs. 30 minutes of aerobic exercise per day 3-4 times a week, reducing salt intake <1.5g/day, and cutting down on highly processed foods are also shown to reduce BP. Uncontrolled BP may result in organ damage to your eyes, brain, heart, and kidneys by causing strokes, heart attacks, kidney failure, and bleeds in your eye. Follow up with your PCP within 1 week of discharge.      SECONDARY DISCHARGE DIAGNOSES  Diagnosis: DM (diabetes mellitus)  Assessment and Plan of Treatment: You have a history of diabetes mellitus. HbA1C on admission was 11.7 which is elevated, meaning your sugars have not been controlled. This is a reflection of your blood sugar level over the last 3 months. We adjusted your medications. Take Basalglar 40 units at bedtime. Take Admelog 6 units three times a day BEFORE meals. Continue to take Farxiga. Stop taking metformin for now.   Please check your blood sugar levels three times daily before meals. Keep a record of your blood sugar readings.  You must maintain a healthy diet that consists of low sugar and low fat. Your diet must consist of mostly vegetables. Please limit your intake of high sugar and high carbohydrate foods such as pasta, rice, and bread. Exercise frequently if possible. Follow up with primary care physician within one week of your discharge to further manage your diabetes and monitor your Hemoglobin A1c levels after 3 months to evaluate your diabetes control. Follow up with endocrinologist for further management.     PRINCIPAL DISCHARGE DIAGNOSIS  Diagnosis: Hypertensive crisis  Assessment and Plan of Treatment: You came to the hospital with chest pain, blurry vision and headache. Your systolic blood pressure was >180 on admission. We gave you IV blood pressure medication in the emergency room to bring it down. Cardiologist, Dr. Carver was consulted. Stress test was negative for any abnormalities. You have a history of high blood pressure, for which you have been on medications. We INCREASED your Coreg to 12.5mg twice a day. It is important to continue to take your medications on time, monitor your blood pressure at home, keep a log of your home readings and follow up with your Primary Care Physician. As per AHA/ACC guidelines, it is important to adhere to a DASH Diet of fresh fruits, vegetables, lean meats such as poultry and fish, and whole wheat carbs. 30 minutes of aerobic exercise per day 3-4 times a week, reducing salt intake <1.5g/day, and cutting down on highly processed foods are also shown to reduce BP. Uncontrolled BP may result in organ damage to your eyes, brain, heart, and kidneys by causing strokes, heart attacks, kidney failure, and bleeds in your eye. Follow up with your PCP within 1 week of discharge.      SECONDARY DISCHARGE DIAGNOSES  Diagnosis: DM (diabetes mellitus)  Assessment and Plan of Treatment: You have a history of diabetes mellitus. HbA1C on admission was 11.7 which is elevated, meaning your sugars have not been controlled. This is a reflection of your blood sugar level over the last 3 months. We adjusted your medications. Take Basalglar 40 units at bedtime. Take Novolog 6 units three times a day BEFORE meals. Continue to take Farxiga. Stop taking metformin for now.   Please check your blood sugar levels three times daily before meals. Keep a record of your blood sugar readings.  You must maintain a healthy diet that consists of low sugar and low fat. Your diet must consist of mostly vegetables. Please limit your intake of high sugar and high carbohydrate foods such as pasta, rice, and bread. Exercise frequently if possible. Follow up with primary care physician within one week of your discharge to further manage your diabetes and monitor your Hemoglobin A1c levels after 3 months to evaluate your diabetes control. Follow up with endocrinologist, Dr. Heredia for further management.     PRINCIPAL DISCHARGE DIAGNOSIS  Diagnosis: Hypertensive crisis  Assessment and Plan of Treatment: You came to the hospital with chest pain, blurry vision and headache. Your systolic blood pressure was >180 on admission. We gave you IV blood pressure medication in the emergency room to bring it down. Cardiologist, Dr. Carver was consulted. Stress test was negative for any abnormalities. Echocardiogram showed normal ejection fraction and grade I diastolic dysfunction. You have a history of high blood pressure, for which you have been on medications. We INCREASED your Coreg to 12.5mg twice a day. It is important to continue to take your medications on time, monitor your blood pressure at home, keep a log of your home readings and follow up with your Primary Care Physician. As per AHA/ACC guidelines, it is important to adhere to a DASH Diet of fresh fruits, vegetables, lean meats such as poultry and fish, and whole wheat carbs. 30 minutes of aerobic exercise per day 3-4 times a week, reducing salt intake <1.5g/day, and cutting down on highly processed foods are also shown to reduce BP. Uncontrolled BP may result in organ damage to your eyes, brain, heart, and kidneys by causing strokes, heart attacks, kidney failure, and bleeds in your eye. Follow up with your PCP and cardiologist within 1-2 weeks of discharge.      SECONDARY DISCHARGE DIAGNOSES  Diagnosis: DM (diabetes mellitus)  Assessment and Plan of Treatment: You have a history of diabetes mellitus. HbA1C on admission was 11.7 which is elevated, meaning your sugars have not been controlled. This is a reflection of your blood sugar level over the last 3 months. We adjusted your medications. Take Basalglar 40 units at bedtime. Take Novolog 6 units three times a day BEFORE meals. Continue to take Farxiga. Stop taking metformin for now.   Please check your blood sugar levels three times daily before meals. Keep a record of your blood sugar readings.  You must maintain a healthy diet that consists of low sugar and low fat. Your diet must consist of mostly vegetables. Please limit your intake of high sugar and high carbohydrate foods such as pasta, rice, and bread. Exercise frequently if possible. Follow up with primary care physician within one week of your discharge to further manage your diabetes and monitor your Hemoglobin A1c levels after 3 months to evaluate your diabetes control. Follow up with endocrinologist, Dr. Heredia for further management.     PRINCIPAL DISCHARGE DIAGNOSIS  Diagnosis: Hypertensive crisis  Assessment and Plan of Treatment: You came to the hospital with chest pain, blurry vision and headache. Your systolic blood pressure was >180 on admission. We gave you IV blood pressure medication in the emergency room to bring it down. Cardiologist, Dr. Carver was consulted. Stress test was negative for any abnormalities. Echocardiogram showed normal ejection fraction and grade I diastolic dysfunction. You have a history of high blood pressure, for which you have been on medications. We INCREASED your Coreg to 12.5mg twice a day. It is important to continue to take your medications on time, monitor your blood pressure at home, keep a log of your home readings and follow up with your Primary Care Physician. As per AHA/ACC guidelines, it is important to adhere to a DASH Diet of fresh fruits, vegetables, lean meats such as poultry and fish, and whole wheat carbs. 30 minutes of aerobic exercise per day 3-4 times a week, reducing salt intake <1.5g/day, and cutting down on highly processed foods are also shown to reduce BP. Uncontrolled BP may result in organ damage to your eyes, brain, heart, and kidneys by causing strokes, heart attacks, kidney failure, and bleeds in your eye. Follow up with your PCP and cardiologist within 1-2 weeks of discharge.      SECONDARY DISCHARGE DIAGNOSES  Diagnosis: DM (diabetes mellitus)  Assessment and Plan of Treatment: You have a history of diabetes mellitus. HbA1C on admission was 11.7 which is elevated, meaning your sugars have not been controlled. This is a reflection of your blood sugar level over the last 3 months. We adjusted your medications. Take Basalglar 40 units at bedtime. Take Novolog 6 units three times a day BEFORE meals. Continue to take Farxiga. Stop taking metformin for now.   Please check your blood sugar levels three times daily before meals. Keep a record of your blood sugar readings.  You must maintain a healthy diet that consists of low sugar and low fat. Your diet must consist of mostly vegetables. Please limit your intake of high sugar and high carbohydrate foods such as pasta, rice, and bread. Exercise frequently if possible. Follow up with primary care physician within one week of your discharge to further manage your diabetes and monitor your Hemoglobin A1c levels after 3 months to evaluate your diabetes control. Follow up with endocrinologist, Dr. Heredia for further management.    Diagnosis: Type 2 diabetes with nephropathy  Assessment and Plan of Treatment: Protein on UA. Better glucose control. Continue on losartan.

## 2023-04-21 NOTE — DISCHARGE NOTE NURSING/CASE MANAGEMENT/SOCIAL WORK - PATIENT PORTAL LINK FT
You can access the FollowMyHealth Patient Portal offered by [facility] by registering at the following website: [email or web address]. By joining [employer]’s FollowMyHealth portal, you will also be able to view your health information using other applications (apps) compatible with our system.

## 2023-04-21 NOTE — DIETITIAN INITIAL EVALUATION ADULT - OTHER INFO
57 years old female visited early today. No history of weight loss reported, no chewing or swallowing difficulties (only when food is too dry at times have problem swallowing), Tolerates current diet with completion of >/50% of the meals. Has been diabetic for ~10-12 years, takes her medication regularly at home. Diabetic education given pt understood directions. All questions and concerns addressed.

## 2023-04-21 NOTE — DISCHARGE NOTE PROVIDER - NSDCQMCOGNITION_NEU_ALL_CORE
Anesthesia Volume In Cc: 3 Anesthesia Type: 1% lidocaine with epinephrine Detail Level: Detailed Removed With: scissors Price (Use Numbers Only, No Special Characters Or $): 125 Consent: Written consent obtained and the risks of skin tag removal was reviewed with the patient including but not limited to bleeding, pigmentary change, infection, pain, and remote possibility of scarring. Total Number Of Lesions Treated: 14 No difficulties

## 2023-04-21 NOTE — PROGRESS NOTE ADULT - PROBLEM SELECTOR PLAN 3
- noted with hgb of 10.6 (baseline of 10-11)  - MCV of 59  - Iron low, iron saturation low, normal TIBC

## 2023-04-21 NOTE — DISCHARGE NOTE PROVIDER - NSDCHHHOMEBOUND_GEN_ALL_CORE
Problem: Potential for Falls  Goal: Patient will remain free of falls  Description: INTERVENTIONS:  - Assess patient frequently for physical needs  -  Identify cognitive and physical deficits and behaviors that affect risk of falls  -  [place] fall precautions as indicated by assessment   - Educate patient/family on patient safety including physical limitations  - Instruct patient to call for assistance with activity based on assessment  - Modify environment to reduce risk of injury  - Consider OT/PT consult to assist with strengthening/mobility  Outcome: Progressing     Problem: Prexisting or High Potential for Compromised Skin Integrity  Goal: Skin integrity is maintained or improved  Description: INTERVENTIONS:  - Identify patients at risk for skin breakdown  - Assess and monitor skin integrity  - Assess and monitor nutrition and hydration status  - Monitor labs   - Assess for incontinence   - Turn and reposition patient  - Assist with mobility/ambulation  - Relieve pressure over bony prominences  - Avoid friction and shearing  - Provide appropriate hygiene as needed including keeping skin clean and dry  - Evaluate need for skin moisturizer/barrier cream  - Collaborate with interdisciplinary team   - Patient/family teaching  - Consider wound care consult   Outcome: Progressing     Problem: Nutrition/Hydration-ADULT  Goal: Nutrient/Hydration intake appropriate for improving, restoring or maintaining nutritional needs  Description: Monitor and assess patient's nutrition/hydration status for malnutrition  Collaborate with interdisciplinary team and initiate plan and interventions as ordered  Monitor patient's weight and dietary intake as ordered or per policy  Utilize nutrition screening tool and intervene as necessary  Determine patient's food preferences and provide high-protein, high-caloric foods as appropriate       INTERVENTIONS:  - Monitor oral intake, urinary output, labs, and treatment plans  - Assess nutrition and hydration status and recommend course of action  - Evaluate amount of meals eaten  - Assist patient with eating if necessary   - Allow adequate time for meals  - Recommend/ encourage appropriate diets, oral nutritional supplements, and vitamin/mineral supplements  - Order, calculate, and assess calorie counts as needed  - Recommend, monitor, and adjust tube feedings and TPN/PPN based on assessed needs  - Assess need for intravenous fluids  - Provide specific nutrition/hydration education as appropriate  - Include patient/family/caregiver in decisions related to nutrition  Outcome: Progressing     Problem: PAIN - ADULT  Goal: Verbalizes/displays adequate comfort level or baseline comfort level  Description: Interventions:  - Encourage patient to monitor pain and request assistance  - Assess pain using appropriate pain scale  - Administer analgesics based on type and severity of pain and evaluate response  - Implement non-pharmacological measures as appropriate and evaluate response  - Consider cultural and social influences on pain and pain management  - Notify physician/advanced practitioner if interventions unsuccessful or patient reports new pain  Outcome: Progressing     Problem: INFECTION - ADULT  Goal: Absence or prevention of progression during hospitalization  Description: INTERVENTIONS:  - Assess and monitor for signs and symptoms of infection  - Monitor lab/diagnostic results  - Monitor all insertion sites, i e  indwelling lines, tubes, and drains  - Monitor endotracheal if appropriate and nasal secretions for changes in amount and color  - [place] appropriate cooling/warming therapies per order  - Administer medications as ordered  - Instruct and encourage patient and family to use good hand hygiene technique  - Identify and instruct in appropriate isolation precautions for identified infection/condition  Outcome: Progressing  Goal: Absence of fever/infection during neutropenic period  Description: INTERVENTIONS:  - Monitor WBC    Outcome: Progressing     Problem: SAFETY ADULT  Goal: Patient will remain free of falls  Description: INTERVENTIONS:  - Assess patient frequently for physical needs  -  Identify cognitive and physical deficits and behaviors that affect risk of falls    -  [place] fall precautions as indicated by assessment   - Educate patient/family on patient safety including physical limitations  - Instruct patient to call for assistance with activity based on assessment  - Modify environment to reduce risk of injury  - Consider OT/PT consult to assist with strengthening/mobility  Outcome: Progressing  Goal: Maintain or return to baseline ADL function  Description: INTERVENTIONS:  -  Assess patient's ability to carry out ADLs; assess patient's baseline for ADL function and identify physical deficits which impact ability to perform ADLs (bathing, care of mouth/teeth, toileting, grooming, dressing, etc )  - Assess/evaluate cause of self-care deficits   - Assess range of motion  - Assess patient's mobility; develop plan if impaired  - Assess patient's need for assistive devices and provide as appropriate  - Encourage maximum independence but intervene and supervise when necessary  - Involve family in performance of ADLs  - Assess for home care needs following discharge   - Consider OT consult to assist with ADL evaluation and planning for discharge  - Provide patient education as appropriate  Outcome: Progressing  Goal: Maintain or return mobility status to optimal level  Description: INTERVENTIONS:  - Assess patient's baseline mobility status (ambulation, transfers, stairs, etc )    - Identify cognitive and physical deficits and behaviors that affect mobility  - Identify mobility aids required to assist with transfers and/or ambulation (gait belt, sit-to-stand, lift, walker, cane, etc )  - [place] fall precautions as indicated by assessment  - Record patient progress and toleration of activity level on Mobility SBAR; progress patient to next Phase/Stage  - Instruct patient to call for assistance with activity based on assessment  - Consider rehabilitation consult to assist with strengthening/weightbearing, etc   Outcome: Progressing     Problem: DISCHARGE PLANNING  Goal: Discharge to home or other facility with appropriate resources  Description: INTERVENTIONS:  - Identify barriers to discharge w/patient and caregiver  - Arrange for needed discharge resources and transportation as appropriate  - Identify discharge learning needs (meds, wound care, etc )  - Arrange for interpretive services to assist at discharge as needed  - Refer to Case Management Department for coordinating discharge planning if the patient needs post-hospital services based on physician/advanced practitioner order or complex needs related to functional status, cognitive ability, or social support system  Outcome: Progressing     Problem: Knowledge Deficit  Goal: Patient/family/caregiver demonstrates understanding of disease process, treatment plan, medications, and discharge instructions  Description: Complete learning assessment and assess knowledge base    Interventions:  - Provide teaching at level of understanding  - Provide teaching via preferred learning methods  Outcome: Progressing     Problem: GENITOURINARY - ADULT  Goal: Maintains or returns to baseline urinary function  Description: INTERVENTIONS:  - Assess urinary function  - Encourage oral fluids to ensure adequate hydration if ordered  - Administer IV fluids as ordered to ensure adequate hydration  - Administer ordered medications as needed  - Offer frequent toileting  - Follow urinary retention protocol if ordered  Outcome: Progressing  Goal: Absence of urinary retention  Description: INTERVENTIONS:  - Assess patients ability to void and empty bladder  - Monitor I/O  - Bladder scan as needed  - Discuss with physician/AP medications to alleviate retention as needed  - Discuss catheterization for long term situations as appropriate  Outcome: Progressing  Goal: Urinary catheter remains patent  Description: INTERVENTIONS:  - Assess patency of urinary catheter  - If patient has a chronic chadwick, consider changing catheter if non-functioning  - Follow guidelines for intermittent irrigation of non-functioning urinary catheter  Outcome: Progressing Other, specify... Fall risk

## 2023-04-21 NOTE — DIETITIAN INITIAL EVALUATION ADULT - PROBLEM SELECTOR PLAN 2
- patient presenting with BP of 193/93  - associated left facial numbness  - CT head negative for acute pathologies  - admit to telemetry  - s/p hydralazine 10mg IV  - continue home medication of losartan and coreg  - DASH diet  - Cardiology Consulted Dr. Carver

## 2023-04-21 NOTE — PROGRESS NOTE ADULT - SUBJECTIVE AND OBJECTIVE BOX
PGY-1 Progress Note discussed with attending    PAGER #: [[phone]] TILL 5:00 PM  PLEASE CONTACT ON CALL TEAM:   - On Call Team (Please refer to Gene) FROM 5:00 PM - 8:30PM  - Nightfloat Team FROM 8:30 -7:30 AM    INTERVAL HPI/OVERNIGHT EVENTS:       REVIEW OF SYSTEMS:  CONSTITUTIONAL: No fever, weight loss, or fatigue  RESPIRATORY: No cough, wheezing, chills or hemoptysis; No shortness of breath  CARDIOVASCULAR: No chest pain, palpitations, dizziness, or leg swelling  GASTROINTESTINAL: No abdominal pain. No nausea, vomiting, or hematemesis; No diarrhea or constipation. No melena or hematochezia.  GENITOURINARY: No dysuria or hematuria, urinary frequency  NEUROLOGICAL: No headaches, memory loss, loss of strength, numbness, or tremors  SKIN: No itching, burning, rashes, or lesions     MEDICATIONS  (STANDING):  carvedilol 12.5 milliGRAM(s) Oral every 12 hours  enoxaparin Injectable 40 milliGRAM(s) SubCutaneous every 24 hours  insulin glargine Injectable (LANTUS) 40 Unit(s) SubCutaneous at bedtime  insulin lispro (ADMELOG) corrective regimen sliding scale   SubCutaneous Before meals and at bedtime  insulin lispro Injectable (ADMELOG) 4 Unit(s) SubCutaneous three times a day before meals  losartan 100 milliGRAM(s) Oral daily    MEDICATIONS  (PRN):  acetaminophen     Tablet .. 650 milliGRAM(s) Oral every 6 hours PRN Temp greater or equal to 38C (100.4F), Mild Pain (1 - 3), Moderate Pain (4 - 6)      Vital Signs Last 24 Hrs  T(C): 37.4 (21 Apr 2023 04:39), Max: 37.4 (21 Apr 2023 04:39)  T(F): 99.3 (21 Apr 2023 04:39), Max: 99.3 (21 Apr 2023 04:39)  HR: 85 (21 Apr 2023 04:39) (62 - 90)  BP: 141/84 (21 Apr 2023 04:39) (138/75 - 186/93)  BP(mean): 104 (20 Apr 2023 12:55) (104 - 104)  RR: 18 (21 Apr 2023 04:39) (18 - 19)  SpO2: 99% (21 Apr 2023 04:39) (97% - 100%)    Parameters below as of 21 Apr 2023 04:39  Patient On (Oxygen Delivery Method): room air        PHYSICAL EXAMINATION:  GENERAL: NAD, well built  HEAD:  Atraumatic, Normocephalic  EYES:  conjunctiva and sclera clear  NECK: Supple, No JVD, Normal thyroid  CHEST/LUNG: Clear to auscultation. Clear to percussion bilaterally; No rales, rhonchi, wheezing, or rubs  HEART: Regular rate and rhythm; No murmurs, rubs, or gallops  ABDOMEN: Soft, Nontender, Nondistended; Bowel sounds present  NERVOUS SYSTEM:  Alert & Oriented X3,    EXTREMITIES:  2+ Peripheral Pulses, No clubbing, cyanosis, or edema  SKIN: warm dry                          10.2   8.43  )-----------( 229      ( 21 Apr 2023 05:35 )             34.4     04-20    142  |  111<H>  |  16  ----------------------------<  174<H>  4.8   |  24  |  1.02    Ca    9.3      20 Apr 2023 04:55  Phos  3.4     04-20  Mg     2.2     04-20    TPro  7.6  /  Alb  3.4<L>  /  TBili  0.8  /  DBili  x   /  AST  8<L>  /  ALT  18  /  AlkPhos  102  04-20    LIVER FUNCTIONS - ( 20 Apr 2023 04:55 )  Alb: 3.4 g/dL / Pro: 7.6 g/dL / ALK PHOS: 102 U/L / ALT: 18 U/L DA / AST: 8 U/L / GGT: x                       I&O's Summary    20 Apr 2023 07:01  -  21 Apr 2023 07:00  --------------------------------------------------------  IN: 250 mL / OUT: 0 mL / NET: 250 mL        CAPILLARY BLOOD GLUCOSE      POCT Blood Glucose.: 150 mg/dL (20 Apr 2023 21:33)    CAPILLARY BLOOD GLUCOSE      POCT Blood Glucose.: 150 mg/dL (20 Apr 2023 21:33)  POCT Blood Glucose.: 178 mg/dL (20 Apr 2023 16:32)  POCT Blood Glucose.: 164 mg/dL (20 Apr 2023 12:52)  POCT Blood Glucose.: 160 mg/dL (20 Apr 2023 08:02)      RADIOLOGY & ADDITIONAL TESTS:                   PGY-1 Progress Note discussed with attending    PAGER #: [[phone]] TILL 5:00 PM  PLEASE CONTACT ON CALL TEAM:   - On Call Team (Please refer to Gene) FROM 5:00 PM - 8:30PM  - Nightfloat Team FROM 8:30 -7:30 AM    INTERVAL HPI/OVERNIGHT EVENTS:   No acute overnight events. Pt states that her chest pain, headache and blurry vision from yesterday has resolved.     REVIEW OF SYSTEMS:  CONSTITUTIONAL: No fever, weight loss, or fatigue  RESPIRATORY: No cough, wheezing, chills or hemoptysis; No shortness of breath  CARDIOVASCULAR: No chest pain, palpitations, dizziness, or leg swelling  GASTROINTESTINAL: No abdominal pain. No nausea, vomiting, or hematemesis; No diarrhea or constipation. No melena or hematochezia.  GENITOURINARY: No dysuria or hematuria, urinary frequency  NEUROLOGICAL: No headaches, memory loss, loss of strength, numbness, or tremors  SKIN: No itching, burning, rashes, or lesions     MEDICATIONS  (STANDING):  carvedilol 12.5 milliGRAM(s) Oral every 12 hours  enoxaparin Injectable 40 milliGRAM(s) SubCutaneous every 24 hours  insulin glargine Injectable (LANTUS) 40 Unit(s) SubCutaneous at bedtime  insulin lispro (ADMELOG) corrective regimen sliding scale   SubCutaneous Before meals and at bedtime  insulin lispro Injectable (ADMELOG) 4 Unit(s) SubCutaneous three times a day before meals  losartan 100 milliGRAM(s) Oral daily    MEDICATIONS  (PRN):  acetaminophen     Tablet .. 650 milliGRAM(s) Oral every 6 hours PRN Temp greater or equal to 38C (100.4F), Mild Pain (1 - 3), Moderate Pain (4 - 6)      Vital Signs Last 24 Hrs  T(C): 37.4 (21 Apr 2023 04:39), Max: 37.4 (21 Apr 2023 04:39)  T(F): 99.3 (21 Apr 2023 04:39), Max: 99.3 (21 Apr 2023 04:39)  HR: 85 (21 Apr 2023 04:39) (62 - 90)  BP: 141/84 (21 Apr 2023 04:39) (138/75 - 186/93)  BP(mean): 104 (20 Apr 2023 12:55) (104 - 104)  RR: 18 (21 Apr 2023 04:39) (18 - 19)  SpO2: 99% (21 Apr 2023 04:39) (97% - 100%)    Parameters below as of 21 Apr 2023 04:39  Patient On (Oxygen Delivery Method): room air        PHYSICAL EXAMINATION:  GENERAL: NAD, well built  HEAD:  Atraumatic, Normocephalic  EYES:  conjunctiva and sclera clear  NECK: Supple, No JVD, Normal thyroid  CHEST/LUNG: Clear to auscultation. Clear to percussion bilaterally; No rales, rhonchi, wheezing, or rubs  HEART: Regular rate and rhythm; No murmurs, rubs, or gallops  ABDOMEN: Soft, Nontender, Nondistended; Bowel sounds present  NERVOUS SYSTEM:  Alert & Oriented X3,    EXTREMITIES:  2+ Peripheral Pulses, No clubbing, cyanosis, or edema  SKIN: warm dry                          10.2   8.43  )-----------( 229      ( 21 Apr 2023 05:35 )             34.4     04-20    142  |  111<H>  |  16  ----------------------------<  174<H>  4.8   |  24  |  1.02    Ca    9.3      20 Apr 2023 04:55  Phos  3.4     04-20  Mg     2.2     04-20    TPro  7.6  /  Alb  3.4<L>  /  TBili  0.8  /  DBili  x   /  AST  8<L>  /  ALT  18  /  AlkPhos  102  04-20    LIVER FUNCTIONS - ( 20 Apr 2023 04:55 )  Alb: 3.4 g/dL / Pro: 7.6 g/dL / ALK PHOS: 102 U/L / ALT: 18 U/L DA / AST: 8 U/L / GGT: x                       I&O's Summary    20 Apr 2023 07:01  -  21 Apr 2023 07:00  --------------------------------------------------------  IN: 250 mL / OUT: 0 mL / NET: 250 mL        CAPILLARY BLOOD GLUCOSE      POCT Blood Glucose.: 150 mg/dL (20 Apr 2023 21:33)    CAPILLARY BLOOD GLUCOSE      POCT Blood Glucose.: 150 mg/dL (20 Apr 2023 21:33)  POCT Blood Glucose.: 178 mg/dL (20 Apr 2023 16:32)  POCT Blood Glucose.: 164 mg/dL (20 Apr 2023 12:52)  POCT Blood Glucose.: 160 mg/dL (20 Apr 2023 08:02)      RADIOLOGY & ADDITIONAL TESTS:                   PGY-1 Progress Note discussed with attending    PAGER #: [[phone]] TILL 5:00 PM  PLEASE CONTACT ON CALL TEAM:   - On Call Team (Please refer to Gene) FROM 5:00 PM - 8:30PM  - Nightfloat Team FROM 8:30 -7:30 AM    INTERVAL HPI/OVERNIGHT EVENTS:   No acute overnight events. Pt states that her chest pain, headache and blurry vision from yesterday has resolved. She notes discomfort from her EKG leads. Relayed her negative stress test results, she expressed understanding. Pending echo read.     REVIEW OF SYSTEMS:  CONSTITUTIONAL: No fever, weight loss, or fatigue  RESPIRATORY: No cough, wheezing, chills or hemoptysis; No shortness of breath  CARDIOVASCULAR: No chest pain, palpitations, dizziness, or leg swelling  GASTROINTESTINAL: No abdominal pain. No nausea, vomiting, or hematemesis; No diarrhea or constipation. No melena or hematochezia.  GENITOURINARY: No dysuria or hematuria, urinary frequency  NEUROLOGICAL: No headaches, memory loss, loss of strength, numbness, or tremors  SKIN: No itching, burning, rashes, or lesions     MEDICATIONS  (STANDING):  carvedilol 12.5 milliGRAM(s) Oral every 12 hours  enoxaparin Injectable 40 milliGRAM(s) SubCutaneous every 24 hours  insulin glargine Injectable (LANTUS) 40 Unit(s) SubCutaneous at bedtime  insulin lispro (ADMELOG) corrective regimen sliding scale   SubCutaneous Before meals and at bedtime  insulin lispro Injectable (ADMELOG) 4 Unit(s) SubCutaneous three times a day before meals  losartan 100 milliGRAM(s) Oral daily    MEDICATIONS  (PRN):  acetaminophen     Tablet .. 650 milliGRAM(s) Oral every 6 hours PRN Temp greater or equal to 38C (100.4F), Mild Pain (1 - 3), Moderate Pain (4 - 6)      Vital Signs Last 24 Hrs  T(C): 37.4 (21 Apr 2023 04:39), Max: 37.4 (21 Apr 2023 04:39)  T(F): 99.3 (21 Apr 2023 04:39), Max: 99.3 (21 Apr 2023 04:39)  HR: 85 (21 Apr 2023 04:39) (62 - 90)  BP: 141/84 (21 Apr 2023 04:39) (138/75 - 186/93)  BP(mean): 104 (20 Apr 2023 12:55) (104 - 104)  RR: 18 (21 Apr 2023 04:39) (18 - 19)  SpO2: 99% (21 Apr 2023 04:39) (97% - 100%)    Parameters below as of 21 Apr 2023 04:39  Patient On (Oxygen Delivery Method): room air        PHYSICAL EXAMINATION:  GENERAL: NAD, well built  HEAD:  Atraumatic, Normocephalic  EYES:  conjunctiva and sclera clear  NECK: Supple, No JVD, Normal thyroid  CHEST/LUNG: Clear to auscultation. Clear to percussion bilaterally; No rales, rhonchi, wheezing, or rubs  HEART: Regular rate and rhythm; No murmurs, rubs, or gallops  ABDOMEN: Soft, Nontender, Nondistended; Bowel sounds present  NERVOUS SYSTEM:  Alert & Oriented X3   EXTREMITIES:  2+ Peripheral Pulses, No clubbing, cyanosis, or edema  SKIN: warm dry                          10.2   8.43  )-----------( 229      ( 21 Apr 2023 05:35 )             34.4     04-20    142  |  111<H>  |  16  ----------------------------<  174<H>  4.8   |  24  |  1.02    Ca    9.3      20 Apr 2023 04:55  Phos  3.4     04-20  Mg     2.2     04-20    TPro  7.6  /  Alb  3.4<L>  /  TBili  0.8  /  DBili  x   /  AST  8<L>  /  ALT  18  /  AlkPhos  102  04-20    LIVER FUNCTIONS - ( 20 Apr 2023 04:55 )  Alb: 3.4 g/dL / Pro: 7.6 g/dL / ALK PHOS: 102 U/L / ALT: 18 U/L DA / AST: 8 U/L / GGT: x             I&O's Summary    20 Apr 2023 07:01  -  21 Apr 2023 07:00  --------------------------------------------------------  IN: 250 mL / OUT: 0 mL / NET: 250 mL      CAPILLARY BLOOD GLUCOSE      POCT Blood Glucose.: 150 mg/dL (20 Apr 2023 21:33)    CAPILLARY BLOOD GLUCOSE      POCT Blood Glucose.: 150 mg/dL (20 Apr 2023 21:33)  POCT Blood Glucose.: 178 mg/dL (20 Apr 2023 16:32)  POCT Blood Glucose.: 164 mg/dL (20 Apr 2023 12:52)  POCT Blood Glucose.: 160 mg/dL (20 Apr 2023 08:02)

## 2023-04-21 NOTE — PROGRESS NOTE ADULT - PROBLEM SELECTOR PLAN 4
- noted to be on insulin 50U at bedtime, farxiga. Recently stopped metformin due to reflux  - start 80% of home dose, Lantus 40U at bedtime  - start premeal coverage 4U TID, adjust as needed  - c/w ISS  - a1c 11.7 - noted to be on insulin 50U at bedtime, farxiga. Recently stopped metformin due to reflux  - start 80% of home dose, Lantus 40U at bedtime  - c/w premeal coverage 4U TID, adjust as needed  - c/w ISS  - a1c 11.7

## 2023-04-21 NOTE — DISCHARGE NOTE PROVIDER - NSDCMRMEDTOKEN_GEN_ALL_CORE_FT
atorvastatin 20 mg oral tablet: 1 orally once a day  carvedilol 6.25 mg oral tablet: 1 orally 2 times a day  Farxiga 10 mg oral tablet: 1 orally once a day  HumaLOG 100 units/mL subcutaneous solution: subcutaneous  Lantus Solostar Pen 100 units/mL subcutaneous solution: 70 unit(s) subcutaneous  losartan 100 mg oral tablet: 1 orally once a day  Semglee 100 units/mL subcutaneous solution: 50 unit(s) subcutaneous   atorvastatin 20 mg oral tablet: 1 orally once a day  carvedilol 12.5 mg oral tablet: 1 tab(s) orally every 12 hours  Farxiga 10 mg oral tablet: 1 orally once a day  losartan 100 mg oral tablet: 1 orally once a day  Semglee 100 units/mL subcutaneous solution: 50 unit(s) subcutaneous   Admelog SoloStar 100 units/mL injectable solution: 6 unit(s) injectable 3 times a day  atorvastatin 20 mg oral tablet: 1 orally once a day  Basaglar KwikPen 100 units/mL subcutaneous solution: 40 unit(s) subcutaneous once a day (at bedtime)  carvedilol 12.5 mg oral tablet: 1 tab(s) orally every 12 hours  Farxiga 10 mg oral tablet: 1 orally once a day  Lancets: Lancets (covered by patient&#x27;s insurance)  losartan 100 mg oral tablet: 1 orally once a day   atorvastatin 20 mg oral tablet: 1 orally once a day  Basaglar KwikPen 100 units/mL subcutaneous solution: 40 unit(s) subcutaneous once a day (at bedtime)  carvedilol 12.5 mg oral tablet: 1 tab(s) orally every 12 hours  Farxiga 10 mg oral tablet: 1 orally once a day  Lancets: Lancets (covered by patient&#x27;s insurance)  Lancets: Lancets (covered by patient&#x27;s insurance)  losartan 100 mg oral tablet: 1 orally once a day  NovoLOG FlexPen 100 units/mL injectable solution: 6 unit(s) injectable 3 times a day

## 2023-04-21 NOTE — DIETITIAN INITIAL EVALUATION ADULT - PROBLEM SELECTOR PLAN 1
- patient presenting with chest pain  - EKG showing sinus bradycardia at 59bpm with no acute ischemic changes  - cardiac enzymes negative x2  - admit to telemetry for ACS r/o  - continue with coreg  - Aspirin and statin   - f/u echocardiogram  - f/u lipids and A1c   - Cardiology consulted Dr. Carver

## 2023-04-21 NOTE — PROGRESS NOTE ADULT - ASSESSMENT
Patient is a 56 yo female with hx of DM, HTN, asthma presents with chest pain. Upon evaluation in ED, patient afebrile with BP of 193/93, hr of 65bpm and saturating well on room air. Labs showing normal cardiac enzymes with hgb of 10.6. EKG showing sinus bradycardia at 59bpm with no acute ischemic changes noted. CT head is negative for acute infarct or bleed. Patient admitted to telemetry for ACS r/o and hypertensive urgency. 
Patient is a 56 yo female with hx of DM, HTN, asthma presents with chest pain. Upon evaluation in ED, patient afebrile with BP of 193/93, hr of 65bpm and saturating well on room air. Labs showing normal cardiac enzymes with hgb of 10.6. EKG showing sinus bradycardia at 59bpm with no acute ischemic changes noted. CT head is negative for acute infarct or bleed. Patient admitted to telemetry for ACS r/o and hypertensive urgency.

## 2023-04-21 NOTE — DISCHARGE NOTE PROVIDER - PROVIDER TOKENS
PROVIDER:[TOKEN:[[id number]],FOLLOWUP:[1 week]],PROVIDER:[TOKEN:[[id number]],FOLLOWUP:[1 week]]

## 2023-04-28 ENCOUNTER — EMERGENCY (EMERGENCY)
Facility: HOSPITAL | Age: 58
LOS: 1 days | Discharge: HOME | End: 2023-04-28
Attending: EMERGENCY MEDICINE | Admitting: EMERGENCY MEDICINE
Payer: COMMERCIAL

## 2023-04-28 VITALS
SYSTOLIC BLOOD PRESSURE: 196 MMHG | HEIGHT: 64 IN | WEIGHT: 136.91 LBS | DIASTOLIC BLOOD PRESSURE: 91 MMHG | RESPIRATION RATE: 16 BRPM | TEMPERATURE: 98 F | OXYGEN SATURATION: 98 % | HEART RATE: 64 BPM

## 2023-04-28 VITALS
SYSTOLIC BLOOD PRESSURE: 173 MMHG | RESPIRATION RATE: 21 BRPM | OXYGEN SATURATION: 98 % | DIASTOLIC BLOOD PRESSURE: 82 MMHG | HEART RATE: 69 BPM | TEMPERATURE: 98 F

## 2023-04-28 DIAGNOSIS — R42 DIZZINESS AND GIDDINESS: ICD-10-CM

## 2023-04-28 PROBLEM — Z86.39 PERSONAL HISTORY OF OTHER ENDOCRINE, NUTRITIONAL AND METABOLIC DISEASE: Chronic | Status: ACTIVE | Noted: 2023-04-20

## 2023-04-28 PROBLEM — Z86.79 PERSONAL HISTORY OF OTHER DISEASES OF THE CIRCULATORY SYSTEM: Chronic | Status: ACTIVE | Noted: 2023-04-20

## 2023-04-28 LAB
ALBUMIN SERPL ELPH-MCNC: 3.5 G/DL — SIGNIFICANT CHANGE UP (ref 3.3–5)
ALP SERPL-CCNC: 96 U/L — SIGNIFICANT CHANGE UP (ref 30–120)
ALT FLD-CCNC: 21 U/L DA — SIGNIFICANT CHANGE UP (ref 10–60)
ANION GAP SERPL CALC-SCNC: 12 MMOL/L — SIGNIFICANT CHANGE UP (ref 5–17)
APPEARANCE UR: CLEAR — SIGNIFICANT CHANGE UP
APTT BLD: 35.9 SEC — HIGH (ref 27.5–35.5)
AST SERPL-CCNC: 30 U/L — SIGNIFICANT CHANGE UP (ref 10–40)
BASOPHILS # BLD AUTO: 0.04 K/UL — SIGNIFICANT CHANGE UP (ref 0–0.2)
BASOPHILS NFR BLD AUTO: 0.5 % — SIGNIFICANT CHANGE UP (ref 0–2)
BILIRUB SERPL-MCNC: 0.6 MG/DL — SIGNIFICANT CHANGE UP (ref 0.2–1.2)
BILIRUB UR-MCNC: NEGATIVE — SIGNIFICANT CHANGE UP
BUN SERPL-MCNC: 21 MG/DL — SIGNIFICANT CHANGE UP (ref 7–23)
CALCIUM SERPL-MCNC: 9.3 MG/DL — SIGNIFICANT CHANGE UP (ref 8.4–10.5)
CHLORIDE SERPL-SCNC: 104 MMOL/L — SIGNIFICANT CHANGE UP (ref 96–108)
CO2 SERPL-SCNC: 22 MMOL/L — SIGNIFICANT CHANGE UP (ref 22–31)
COLOR SPEC: YELLOW — SIGNIFICANT CHANGE UP
CREAT SERPL-MCNC: 1.17 MG/DL — SIGNIFICANT CHANGE UP (ref 0.5–1.3)
D DIMER BLD IA.RAPID-MCNC: <150 NG/ML DDU — SIGNIFICANT CHANGE UP
DIFF PNL FLD: NEGATIVE — SIGNIFICANT CHANGE UP
EGFR: 54 ML/MIN/1.73M2 — LOW
EOSINOPHIL # BLD AUTO: 0.25 K/UL — SIGNIFICANT CHANGE UP (ref 0–0.5)
EOSINOPHIL NFR BLD AUTO: 2.9 % — SIGNIFICANT CHANGE UP (ref 0–6)
GLUCOSE BLDC GLUCOMTR-MCNC: 110 MG/DL — HIGH (ref 70–99)
GLUCOSE SERPL-MCNC: 141 MG/DL — HIGH (ref 70–99)
GLUCOSE UR QL: 500 MG/DL
HCT VFR BLD CALC: 34.4 % — LOW (ref 34.5–45)
HGB BLD-MCNC: 10.5 G/DL — LOW (ref 11.5–15.5)
IMM GRANULOCYTES NFR BLD AUTO: 0.2 % — SIGNIFICANT CHANGE UP (ref 0–0.9)
INR BLD: 1.06 RATIO — SIGNIFICANT CHANGE UP (ref 0.88–1.16)
KETONES UR-MCNC: NEGATIVE MG/DL — SIGNIFICANT CHANGE UP
LEUKOCYTE ESTERASE UR-ACNC: NEGATIVE — SIGNIFICANT CHANGE UP
LYMPHOCYTES # BLD AUTO: 2 K/UL — SIGNIFICANT CHANGE UP (ref 1–3.3)
LYMPHOCYTES # BLD AUTO: 23.6 % — SIGNIFICANT CHANGE UP (ref 13–44)
MCHC RBC-ENTMCNC: 17.8 PG — LOW (ref 27–34)
MCHC RBC-ENTMCNC: 30.5 GM/DL — LOW (ref 32–36)
MCV RBC AUTO: 58.2 FL — LOW (ref 80–100)
MONOCYTES # BLD AUTO: 0.54 K/UL — SIGNIFICANT CHANGE UP (ref 0–0.9)
MONOCYTES NFR BLD AUTO: 6.4 % — SIGNIFICANT CHANGE UP (ref 2–14)
NEUTROPHILS # BLD AUTO: 5.64 K/UL — SIGNIFICANT CHANGE UP (ref 1.8–7.4)
NEUTROPHILS NFR BLD AUTO: 66.4 % — SIGNIFICANT CHANGE UP (ref 43–77)
NITRITE UR-MCNC: NEGATIVE — SIGNIFICANT CHANGE UP
NRBC # BLD: 0 /100 WBCS — SIGNIFICANT CHANGE UP (ref 0–0)
NT-PROBNP SERPL-SCNC: 57 PG/ML — SIGNIFICANT CHANGE UP (ref 0–125)
PH UR: 5 — SIGNIFICANT CHANGE UP (ref 5–8)
PLATELET # BLD AUTO: 227 K/UL — SIGNIFICANT CHANGE UP (ref 150–400)
POTASSIUM SERPL-MCNC: 5.1 MMOL/L — SIGNIFICANT CHANGE UP (ref 3.5–5.3)
POTASSIUM SERPL-SCNC: 5.1 MMOL/L — SIGNIFICANT CHANGE UP (ref 3.5–5.3)
PROT SERPL-MCNC: 7.5 G/DL — SIGNIFICANT CHANGE UP (ref 6–8.3)
PROT UR-MCNC: NEGATIVE MG/DL — SIGNIFICANT CHANGE UP
PROTHROM AB SERPL-ACNC: 12.5 SEC — SIGNIFICANT CHANGE UP (ref 10.5–13.4)
RBC # BLD: 5.91 M/UL — HIGH (ref 3.8–5.2)
RBC # FLD: 18.9 % — HIGH (ref 10.3–14.5)
SODIUM SERPL-SCNC: 138 MMOL/L — SIGNIFICANT CHANGE UP (ref 135–145)
SP GR SPEC: 1 — SIGNIFICANT CHANGE UP (ref 1–1.03)
TROPONIN I, HIGH SENSITIVITY RESULT: 5.4 NG/L — SIGNIFICANT CHANGE UP
TROPONIN I, HIGH SENSITIVITY RESULT: <4 NG/L — SIGNIFICANT CHANGE UP
UROBILINOGEN FLD QL: 0.2 MG/DL — SIGNIFICANT CHANGE UP (ref 0.2–1)
WBC # BLD: 8.49 K/UL — SIGNIFICANT CHANGE UP (ref 3.8–10.5)
WBC # FLD AUTO: 8.49 K/UL — SIGNIFICANT CHANGE UP (ref 3.8–10.5)

## 2023-04-28 PROCEDURE — 85610 PROTHROMBIN TIME: CPT

## 2023-04-28 PROCEDURE — 83880 ASSAY OF NATRIURETIC PEPTIDE: CPT

## 2023-04-28 PROCEDURE — 96375 TX/PRO/DX INJ NEW DRUG ADDON: CPT

## 2023-04-28 PROCEDURE — 36415 COLL VENOUS BLD VENIPUNCTURE: CPT

## 2023-04-28 PROCEDURE — 96374 THER/PROPH/DIAG INJ IV PUSH: CPT

## 2023-04-28 PROCEDURE — 93010 ELECTROCARDIOGRAM REPORT: CPT

## 2023-04-28 PROCEDURE — 85025 COMPLETE CBC W/AUTO DIFF WBC: CPT

## 2023-04-28 PROCEDURE — 80053 COMPREHEN METABOLIC PANEL: CPT

## 2023-04-28 PROCEDURE — 81003 URINALYSIS AUTO W/O SCOPE: CPT

## 2023-04-28 PROCEDURE — 99285 EMERGENCY DEPT VISIT HI MDM: CPT

## 2023-04-28 PROCEDURE — 71045 X-RAY EXAM CHEST 1 VIEW: CPT | Mod: 26

## 2023-04-28 PROCEDURE — 85730 THROMBOPLASTIN TIME PARTIAL: CPT

## 2023-04-28 PROCEDURE — 71045 X-RAY EXAM CHEST 1 VIEW: CPT

## 2023-04-28 PROCEDURE — 85379 FIBRIN DEGRADATION QUANT: CPT

## 2023-04-28 PROCEDURE — 82962 GLUCOSE BLOOD TEST: CPT

## 2023-04-28 PROCEDURE — 84484 ASSAY OF TROPONIN QUANT: CPT

## 2023-04-28 PROCEDURE — 93005 ELECTROCARDIOGRAM TRACING: CPT

## 2023-04-28 PROCEDURE — 99285 EMERGENCY DEPT VISIT HI MDM: CPT | Mod: 25

## 2023-04-28 RX ORDER — LABETALOL HCL 100 MG
20 TABLET ORAL ONCE
Refills: 0 | Status: COMPLETED | OUTPATIENT
Start: 2023-04-28 | End: 2023-04-28

## 2023-04-28 RX ORDER — KETOROLAC TROMETHAMINE 30 MG/ML
30 SYRINGE (ML) INJECTION ONCE
Refills: 0 | Status: DISCONTINUED | OUTPATIENT
Start: 2023-04-28 | End: 2023-04-28

## 2023-04-28 RX ORDER — HYDRALAZINE HCL 50 MG
10 TABLET ORAL ONCE
Refills: 0 | Status: COMPLETED | OUTPATIENT
Start: 2023-04-28 | End: 2023-04-28

## 2023-04-28 RX ADMIN — Medication 30 MILLIGRAM(S): at 13:08

## 2023-04-28 RX ADMIN — Medication 20 MILLIGRAM(S): at 12:24

## 2023-04-28 RX ADMIN — Medication 10 MILLIGRAM(S): at 14:40

## 2023-04-28 NOTE — ED PROVIDER NOTE - PROGRESS NOTE DETAILS
BP and glucose improved. Pt feels much better. Seen by Cardio Eric and cleared for DC home. FU Cardio and Endo as discussed.

## 2023-04-28 NOTE — ED PROVIDER NOTE - PATIENT PORTAL LINK FT
You can access the FollowMyHealth Patient Portal offered by [facility] by registering at the following website: [email or web address]. By joining [facility]’s FollowMyHealth portal, you will also be able to view your health information using other applications (apps) compatible with our system.

## 2023-04-28 NOTE — ED ADULT TRIAGE NOTE - CHIEF COMPLAINT QUOTE
" I have pressure at the back of my head, I also have pain underneath both breast started about an hour ago while at work, m y blood pressure is high "  NTG 0.4 mg SL/  mg given by EMS

## 2023-04-28 NOTE — ED PROVIDER NOTE - OBJECTIVE STATEMENT
57-year-old female with history of hypertension diabetes brought by ambulance from workplace for evaluation of headache neck pain chest pain elevated blood pressure this morning.  Was given nitroglycerin by ambulance.  BP still high 200/100.  Patient states she was admitted to [facility] 2 weeks ago for same and had complete work-up including CT CTA of brain and nuclear stress test and echo all of which were normal.  Had insulin added to her regimen for elevated blood sugars.  Had hydrochlorothiazide added to her BP meds.  Her PCP is in [place].

## 2023-04-28 NOTE — CONSULT NOTE ADULT - SUBJECTIVE AND OBJECTIVE BOX
History of Present Illness: The patient is a 57 year old female with a history of HTN, HL, DM who presents with chest pain and headache. She states she has had intermittent occipital head pressure. She has also had bilateral under the breast chest discomfort. The chest pain is improved if she elevates her breasts. It is worse when lying down. It is not radiating, worse with inspiration, or worse with exertion. She was hospitalized with similar symptoms one week ago and had a normal nuclear stress test and echo. She was started on additional antihypertensives. She states her blood pressures at home have been ranging in the 120-150s systolic.    Past Medical/Surgical History:  HTN, HL, DM    Medications:  Home Medications:  atorvastatin 20 mg oral tablet: 1 orally once a day (20 Apr 2023 15:01)  Farxiga 10 mg oral tablet: 1 orally once a day (21 Apr 2023 14:12)  losartan 100 mg oral tablet: 1 orally once a day (20 Apr 2023 15:01)  carvedilol 12.5 mg bid  HCTZ 25 mg daily      Family History: Non-contributory family history of premature cardiovascular atherosclerotic disease    Social History: No tobacco, alcohol or drug use    Review of Systems:  General: No fevers, chills, weight gain  Skin: No rashes, color changes  Cardiovascular: No chest pain, orthopnea  Respiratory: No shortness of breath, cough  Gastrointestinal: No nausea, abdominal pain  Genitourinary: No incontinence, pain with urination  Musculoskeletal: No pain, swelling, decreased range of motion  Neurological: No headache, weakness  Psychiatric: No depression, anxiety  Endocrine: No weight gain, increased thirst  All other systems are comprehensively negative.    Physical Exam:  Vitals:        Vital Signs Last 24 Hrs  T(C): 36.5 (28 Apr 2023 12:00), Max: 36.5 (28 Apr 2023 12:00)  T(F): 97.7 (28 Apr 2023 12:00), Max: 97.7 (28 Apr 2023 12:00)  HR: 55 (28 Apr 2023 13:20) (55 - 64)  BP: 191/93 (28 Apr 2023 13:20) (191/93 - 196/91)  BP(mean): 133 (28 Apr 2023 13:20) (133 - 133)  RR: 20 (28 Apr 2023 13:20) (16 - 20)  SpO2: 100% (28 Apr 2023 13:20) (98% - 100%)    Parameters below as of 28 Apr 2023 13:20  Patient On (Oxygen Delivery Method): room air      General: NAD  HEENT: MMM  Neck: No JVD, no carotid bruit  Lungs: CTAB  CV: RRR, nl S1/S2, no M/R/G  Abdomen: S/NT/ND, +BS  Extremities: No LE edema, no cyanosis  Neuro: AAOx3, non-focal  Skin: No rash    Labs:                        10.5   8.49  )-----------( 227      ( 28 Apr 2023 12:39 )             34.4     04-28    138  |  104  |  21  ----------------------------<  141<H>  5.1   |  22  |  1.17    Ca    9.3      28 Apr 2023 12:39    TPro  7.5  /  Alb  3.5  /  TBili  0.6  /  DBili  x   /  AST  30  /  ALT  21  /  AlkPhos  96  04-28        PT/INR - ( 28 Apr 2023 12:39 )   PT: 12.5 sec;   INR: 1.06 ratio         PTT - ( 28 Apr 2023 12:39 )  PTT:35.9 sec    ECG/Telemetry:

## 2023-04-28 NOTE — ED ADULT NURSE NOTE - OBJECTIVE STATEMENT
Pt is a 57yr old female, c/o chest pressure x2 days (under breast area), denies the pain radiating to other area. Pt states it's intermittent in nature and also has posterior headache. Pt states lately her BP has been elevated more then usual. Pt states she's had a recent medication addition (Htz). Pt states she's compliant with her meds otherwise. Pt is A+Ox3, calm and cooperative, able to move all extremities. Unlabored breathing at this time. Abdomen soft and non-distended. Cap refill less than 2 seconds. No distress noted. MD evaluation in progress.    In route by EMS, pt received nitro SL 0.4mg and aspirin 324mg PO, pt states she has relief from posterior headache at this time, 5/10

## 2023-04-28 NOTE — ED PROVIDER NOTE - CARE PROVIDER_API CALL
Cristian Cash)  Cardiology  [address], [address]  [place] [address]  Phone: [phone]  Fax: [phone]  Established Patient  Follow Up Time: Urgent

## 2023-04-28 NOTE — CONSULT NOTE ADULT - ASSESSMENT
The patient is a 57 year old female with a history of HTN, HL, DM who presents with chest pain and headache.     Plan:  - Chest discomfort symptoms by description more consistent with soft tissue or musculoskeletal etiology  - ECG with no evidence of ischemia or infarction  - Rule out an acute MI with two sets of cardiac enzymes  - Recent nuclear stress test was normal  - Suspect elevated blood pressure more consequence of her headache/chest discomfort rather than the cause  - No evidence of LVH on ECG or echo  - Continue losartan 100 mg daily  - Continue HCTZ 25 mg daily  - Continue carvedilol 12.5 mg bid  - Trial of NSAIDs  - If above negative, she can be discharged from a cardiac standpoint and follow-up as outpatient

## 2023-04-28 NOTE — ED PROVIDER NOTE - NSFOLLOWUPINSTRUCTIONS_ED_ALL_ED_FT
Hypertension, Adult  High blood pressure (hypertension) is when the force of blood pumping through the arteries is too strong. The arteries are the blood vessels that carry blood from the heart throughout the body. Hypertension forces the heart to work harder to pump blood and may cause arteries to become narrow or stiff. Untreated or uncontrolled hypertension can lead to a heart attack, heart failure, a stroke, kidney disease, and other problems.    A blood pressure reading consists of a higher number over a lower number. Ideally, your blood pressure should be below 120/80. The first ("top") number is called the systolic pressure. It is a measure of the pressure in your arteries as your heart beats. The second ("bottom") number is called the diastolic pressure. It is a measure of the pressure in your arteries as the heart relaxes.    What are the causes?  The exact cause of this condition is not known. There are some conditions that result in high blood pressure.    What increases the risk?  Certain factors may make you more likely to develop high blood pressure. Some of these risk factors are under your control, including:  Smoking.  Not getting enough exercise or physical activity.  Being overweight.  Having too much fat, sugar, calories, or salt (sodium) in your diet.  Drinking too much alcohol.  Other risk factors include:  Having a personal history of heart disease, diabetes, high cholesterol, or kidney disease.  Stress.  Having a family history of high blood pressure and high cholesterol.  Having obstructive sleep apnea.  Age. The risk increases with age.  What are the signs or symptoms?  High blood pressure may not cause symptoms. Very high blood pressure (hypertensive crisis) may cause:  Headache.  Fast or irregular heartbeats (palpitations).  Shortness of breath.  Nosebleed.  Nausea and vomiting.  Vision changes.  Severe chest pain, dizziness, and seizures.  How is this diagnosed?  This condition is diagnosed by measuring your blood pressure while you are seated, with your arm resting on a flat surface, your legs uncrossed, and your feet flat on the floor. The cuff of the blood pressure monitor will be placed directly against the skin of your upper arm at the level of your heart. Blood pressure should be measured at least twice using the same arm. Certain conditions can cause a difference in blood pressure between your right and left arms.    If you have a high blood pressure reading during one visit or you have normal blood pressure with other risk factors, you may be asked to:  Return on a different day to have your blood pressure checked again.  Monitor your blood pressure at home for 1 week or longer.  If you are diagnosed with hypertension, you may have other blood or imaging tests to help your health care provider understand your overall risk for other conditions.    How is this treated?  This condition is treated by making healthy lifestyle changes, such as eating healthy foods, exercising more, and reducing your alcohol intake. You may be referred for counseling on a healthy diet and physical activity.    Your health care provider may prescribe medicine if lifestyle changes are not enough to get your blood pressure under control and if:  Your systolic blood pressure is above 130.  Your diastolic blood pressure is above 80.  Your personal target blood pressure may vary depending on your medical conditions, your age, and other factors.    Follow these instructions at home:  Eating and drinking    A plate with examples of foods in a healthy diet.  Eat a diet that is high in fiber and potassium, and low in sodium, added sugar, and fat. An example of this eating plan is called the DASH diet. DASH stands for Dietary Approaches to Stop Hypertension. To eat this way:  Eat plenty of fresh fruits and vegetables. Try to fill one half of your plate at each meal with fruits and vegetables.  Eat whole grains, such as whole-wheat pasta, brown rice, or whole-grain bread. Fill about one fourth of your plate with whole grains.  Eat or drink low-fat dairy products, such as skim milk or low-fat yogurt.  Avoid fatty cuts of meat, processed or cured meats, and poultry with skin. Fill about one fourth of your plate with lean proteins, such as fish, chicken without skin, beans, eggs, or tofu.  Avoid pre-made and processed foods. These tend to be higher in sodium, added sugar, and fat.  Reduce your daily sodium intake. Many people with hypertension should eat less than 1,500 mg of sodium a day.  Do not drink alcohol if:  Your health care provider tells you not to drink.  You are pregnant, may be pregnant, or are planning to become pregnant.  If you drink alcohol:  Limit how much you have to:  0–1 drink a day for women.  0–2 drinks a day for men.  Know how much alcohol is in your drink. In the [place] one drink equals one 12 oz bottle of beer (355 mL), one 5 oz glass of wine (148 mL), or one 1½ oz glass of hard liquor (44 mL).  Lifestyle    A blood pressure monitor and cuff.   Work with your health care provider to maintain a healthy body weight or to lose weight. Ask what an ideal weight is for you.  Get at least 30 minutes of exercise that causes your heart to beat faster (aerobic exercise) most days of the week. Activities may include walking, swimming, or biking.  Include exercise to strengthen your muscles (resistance exercise), such as Pilates or lifting weights, as part of your weekly exercise routine. Try to do these types of exercises for 30 minutes at least 3 days a week.  Do not use any products that contain nicotine or tobacco. These products include cigarettes, chewing tobacco, and vaping devices, such as e-cigarettes. If you need help quitting, ask your health care provider.  Monitor your blood pressure at home as told by your health care provider.  Keep all follow-up visits. This is important.  Medicines    Take over-the-counter and prescription medicines only as told by your health care provider. Follow directions carefully. Blood pressure medicines must be taken as prescribed.  Do not skip doses of blood pressure medicine. Doing this puts you at risk for problems and can make the medicine less effective.  Ask your health care provider about side effects or reactions to medicines that you should watch for.  Contact a health care provider if you:  Think you are having a reaction to a medicine you are taking.  Have headaches that keep coming back (recurring).  Feel dizzy.  Have swelling in your ankles.  Have trouble with your vision.  Get help right away if you:  Develop a severe headache or confusion.  Have unusual weakness or numbness.  Feel faint.  Have severe pain in your chest or abdomen.  Vomit repeatedly.  Have trouble breathing.  These symptoms may be an emergency. Get help right away. Call 911.  Do not wait to see if the symptoms will go away.  Do not drive yourself to the hospital.  Summary  Hypertension is when the force of blood pumping through your arteries is too strong. If this condition is not controlled, it may put you at risk for serious complications.  Your personal target blood pressure may vary depending on your medical conditions, your age, and other factors. For most people, a normal blood pressure is less than 120/80.  Hypertension is treated with lifestyle changes, medicines, or a combination of both. Lifestyle changes include losing weight, eating a healthy, low-sodium diet, exercising more, and limiting alcohol.  This information is not intended to replace advice given to you by your health care provider. Make sure you discuss any questions you have with your health care provider.

## 2023-04-28 NOTE — ED PROVIDER NOTE - CLINICAL SUMMARY MEDICAL DECISION MAKING FREE TEXT BOX
57-year-old female with history of hypertension diabetes brought by ambulance from workplace for evaluation of headache neck pain chest pain elevated blood pressure this morning.  Was given nitroglycerin by ambulance.  BP still high 200/100.  Patient states she was admitted to [facility] 2 weeks ago for same and had complete work-up including CT CTA of brain and nuclear stress test and echo all of which were normal.  Had insulin added to her regimen for elevated blood sugars.  Had hydrochlorothiazide added to her BP meds.  Her PCP is in [place].  I reviewed records from [place] and prior [place] ER visit.    BP high.  Physical exam otherwise normal.  Impression is chest pain accelerated hypertension.  Plan is we will get labs with troponin EKG chest x-ray.  Will give dose of IV labetalol.  May need repeat cardiology evaluation for guidance on blood pressure control.

## 2023-05-01 ENCOUNTER — EMERGENCY (EMERGENCY)
Facility: HOSPITAL | Age: 58
LOS: 1 days | Discharge: HOME | End: 2023-05-01
Attending: EMERGENCY MEDICINE
Payer: COMMERCIAL

## 2023-05-01 VITALS
DIASTOLIC BLOOD PRESSURE: 91 MMHG | HEART RATE: 69 BPM | TEMPERATURE: 98 F | RESPIRATION RATE: 18 BRPM | SYSTOLIC BLOOD PRESSURE: 154 MMHG | OXYGEN SATURATION: 99 % | WEIGHT: 136.91 LBS | HEIGHT: 64 IN

## 2023-05-01 LAB
ALBUMIN SERPL ELPH-MCNC: 3.4 G/DL — LOW (ref 3.5–5)
ALP SERPL-CCNC: 88 U/L — SIGNIFICANT CHANGE UP (ref 40–120)
ALT FLD-CCNC: 21 U/L DA — SIGNIFICANT CHANGE UP (ref 10–60)
ANION GAP SERPL CALC-SCNC: 7 MMOL/L — SIGNIFICANT CHANGE UP (ref 5–17)
APTT BLD: 39.8 SEC — HIGH (ref 27.5–35.5)
AST SERPL-CCNC: 11 U/L — SIGNIFICANT CHANGE UP (ref 10–40)
BASOPHILS # BLD AUTO: 0.03 K/UL — SIGNIFICANT CHANGE UP (ref 0–0.2)
BASOPHILS NFR BLD AUTO: 0.3 % — SIGNIFICANT CHANGE UP (ref 0–2)
BILIRUB SERPL-MCNC: 0.7 MG/DL — SIGNIFICANT CHANGE UP (ref 0.2–1.2)
BUN SERPL-MCNC: 27 MG/DL — HIGH (ref 7–18)
CALCIUM SERPL-MCNC: 9.5 MG/DL — SIGNIFICANT CHANGE UP (ref 8.4–10.5)
CHLORIDE SERPL-SCNC: 107 MMOL/L — SIGNIFICANT CHANGE UP (ref 96–108)
CO2 SERPL-SCNC: 25 MMOL/L — SIGNIFICANT CHANGE UP (ref 22–31)
CREAT SERPL-MCNC: 1.28 MG/DL — SIGNIFICANT CHANGE UP (ref 0.5–1.3)
EGFR: 49 ML/MIN/1.73M2 — LOW
EOSINOPHIL # BLD AUTO: 0.14 K/UL — SIGNIFICANT CHANGE UP (ref 0–0.5)
EOSINOPHIL NFR BLD AUTO: 1.6 % — SIGNIFICANT CHANGE UP (ref 0–6)
GLUCOSE SERPL-MCNC: 105 MG/DL — HIGH (ref 70–99)
HCT VFR BLD CALC: 36.4 % — SIGNIFICANT CHANGE UP (ref 34.5–45)
HGB BLD-MCNC: 11.1 G/DL — LOW (ref 11.5–15.5)
IMM GRANULOCYTES NFR BLD AUTO: 0.1 % — SIGNIFICANT CHANGE UP (ref 0–0.9)
INR BLD: 1.06 RATIO — SIGNIFICANT CHANGE UP (ref 0.88–1.16)
LYMPHOCYTES # BLD AUTO: 2.4 K/UL — SIGNIFICANT CHANGE UP (ref 1–3.3)
LYMPHOCYTES # BLD AUTO: 26.9 % — SIGNIFICANT CHANGE UP (ref 13–44)
MAGNESIUM SERPL-MCNC: 2.6 MG/DL — SIGNIFICANT CHANGE UP (ref 1.6–2.6)
MCHC RBC-ENTMCNC: 17.8 PG — LOW (ref 27–34)
MCHC RBC-ENTMCNC: 30.5 GM/DL — LOW (ref 32–36)
MCV RBC AUTO: 58.4 FL — LOW (ref 80–100)
MONOCYTES # BLD AUTO: 0.6 K/UL — SIGNIFICANT CHANGE UP (ref 0–0.9)
MONOCYTES NFR BLD AUTO: 6.7 % — SIGNIFICANT CHANGE UP (ref 2–14)
NEUTROPHILS # BLD AUTO: 5.73 K/UL — SIGNIFICANT CHANGE UP (ref 1.8–7.4)
NEUTROPHILS NFR BLD AUTO: 64.4 % — SIGNIFICANT CHANGE UP (ref 43–77)
NRBC # BLD: 0 /100 WBCS — SIGNIFICANT CHANGE UP (ref 0–0)
PLATELET # BLD AUTO: 272 K/UL — SIGNIFICANT CHANGE UP (ref 150–400)
POTASSIUM SERPL-MCNC: 4.1 MMOL/L — SIGNIFICANT CHANGE UP (ref 3.5–5.3)
POTASSIUM SERPL-SCNC: 4.1 MMOL/L — SIGNIFICANT CHANGE UP (ref 3.5–5.3)
PROT SERPL-MCNC: 7.4 G/DL — SIGNIFICANT CHANGE UP (ref 6–8.3)
PROTHROM AB SERPL-ACNC: 12.6 SEC — SIGNIFICANT CHANGE UP (ref 10.5–13.4)
RBC # BLD: 6.23 M/UL — HIGH (ref 3.8–5.2)
RBC # FLD: 18.5 % — HIGH (ref 10.3–14.5)
SODIUM SERPL-SCNC: 139 MMOL/L — SIGNIFICANT CHANGE UP (ref 135–145)
TROPONIN I, HIGH SENSITIVITY RESULT: 4.6 NG/L — SIGNIFICANT CHANGE UP
WBC # BLD: 8.91 K/UL — SIGNIFICANT CHANGE UP (ref 3.8–10.5)
WBC # FLD AUTO: 8.91 K/UL — SIGNIFICANT CHANGE UP (ref 3.8–10.5)

## 2023-05-01 PROCEDURE — 70498 CT ANGIOGRAPHY NECK: CPT | Mod: 26,MA

## 2023-05-01 PROCEDURE — 71045 X-RAY EXAM CHEST 1 VIEW: CPT

## 2023-05-01 PROCEDURE — 93010 ELECTROCARDIOGRAM REPORT: CPT

## 2023-05-01 PROCEDURE — 71045 X-RAY EXAM CHEST 1 VIEW: CPT | Mod: 26

## 2023-05-01 PROCEDURE — 83735 ASSAY OF MAGNESIUM: CPT

## 2023-05-01 PROCEDURE — 85025 COMPLETE CBC W/AUTO DIFF WBC: CPT

## 2023-05-01 PROCEDURE — 70496 CT ANGIOGRAPHY HEAD: CPT | Mod: 26,MA

## 2023-05-01 PROCEDURE — 84484 ASSAY OF TROPONIN QUANT: CPT

## 2023-05-01 PROCEDURE — 85730 THROMBOPLASTIN TIME PARTIAL: CPT

## 2023-05-01 PROCEDURE — 99284 EMERGENCY DEPT VISIT MOD MDM: CPT

## 2023-05-01 PROCEDURE — 99285 EMERGENCY DEPT VISIT HI MDM: CPT | Mod: 25

## 2023-05-01 PROCEDURE — 36415 COLL VENOUS BLD VENIPUNCTURE: CPT

## 2023-05-01 PROCEDURE — 70498 CT ANGIOGRAPHY NECK: CPT | Mod: MA

## 2023-05-01 PROCEDURE — 85610 PROTHROMBIN TIME: CPT

## 2023-05-01 PROCEDURE — 80053 COMPREHEN METABOLIC PANEL: CPT

## 2023-05-01 PROCEDURE — 82962 GLUCOSE BLOOD TEST: CPT

## 2023-05-01 PROCEDURE — 96374 THER/PROPH/DIAG INJ IV PUSH: CPT | Mod: XU

## 2023-05-01 PROCEDURE — 93005 ELECTROCARDIOGRAM TRACING: CPT

## 2023-05-01 PROCEDURE — 70496 CT ANGIOGRAPHY HEAD: CPT | Mod: MA

## 2023-05-01 PROCEDURE — 96375 TX/PRO/DX INJ NEW DRUG ADDON: CPT | Mod: XU

## 2023-05-01 RX ORDER — KETOROLAC TROMETHAMINE 30 MG/ML
30 SYRINGE (ML) INJECTION ONCE
Refills: 0 | Status: DISCONTINUED | OUTPATIENT
Start: 2023-05-01 | End: 2023-05-01

## 2023-05-01 RX ORDER — METOCLOPRAMIDE HCL 10 MG
10 TABLET ORAL ONCE
Refills: 0 | Status: COMPLETED | OUTPATIENT
Start: 2023-05-01 | End: 2023-05-01

## 2023-05-01 RX ADMIN — Medication 30 MILLIGRAM(S): at 19:29

## 2023-05-01 RX ADMIN — Medication 10 MILLIGRAM(S): at 19:29

## 2023-05-01 NOTE — ED ADULT NURSE NOTE - CHIEF COMPLAINT QUOTE
woke up with feeling like left side face droops, left side of head pressure, left arm numbness 300AM, last known well @830 PM, c/o blurred vision,

## 2023-05-01 NOTE — ED PROVIDER NOTE - PATIENT PORTAL LINK FT
You can access the FollowMyHealth Patient Portal offered by [facility] by registering at the following website: [email or web address]. By joining [employer]s FollowMyHealth portal, you will also be able to view your health information using other applications (apps) compatible with our system.

## 2023-05-01 NOTE — ED PROVIDER NOTE - OBJECTIVE STATEMENT
57 year old female PMH HTN, DM coming in with episode of left facial droop and LUE tingling sensation with associated generalized weakness starting around 3am. went to cardiologist and was referred to the ed for eval.

## 2023-05-01 NOTE — ED PROVIDER NOTE - CLINICAL SUMMARY MEDICAL DECISION MAKING FREE TEXT BOX
57 year old female with facial droop/numbness/generazlied weakness. PE as above.  labs, cta head/neck, ecg, reassess

## 2023-05-01 NOTE — ED ADULT NURSE NOTE - NS ED PATIENT SAFETY CONCERN
No I will STOP taking the medications listed below when I get home from the hospital:    omeprazole 20 mg oral delayed release capsule  -- 1 cap(s) by mouth once a day    cyanocobalamin 100 mcg oral tablet  -- 1 tab(s) by mouth once a day    ofloxacin 0.3% ophthalmic solution  -- 1 drop(s) in the left eye 3 times a day    cyclopentolate 2% ophthalmic solution  -- 1 drop(s) in the left eye 2 times a day    prednisoLONE acetate 1% ophthalmic suspension  -- 1 drop(s) to each affected eye every 4 hours

## 2023-05-01 NOTE — ED ADULT TRIAGE NOTE - CHIEF COMPLAINT QUOTE
feeling like dripping left side face, left arm numbness, pressure to back of head, c/o blurred vision, woke up with feeling like left side face droops, left side of head pressure, left arm numbness 300AM, last known well @830 PM, c/o blurred vision,

## 2023-05-01 NOTE — ED ADULT NURSE NOTE - NS ED NOTE  TALK SOMEONE YN
FAMILY PRACTICE OFFICE VISIT       NAME: Marlene Juan  AGE: 61 y o  SEX: male       : 1963        MRN: [id number]    DATE: 3/23/2023  TIME: 10:18 AM    Assessment and Plan   1  Hypothyroidism due to Hashimoto's thyroiditis  Comments:  Jayce Handley is stable on exam - appears well  TSH much better, but still a little too high for management  Increase Levothyroxine carefully to 75mcg  Orders:  -     levothyroxine (Euthyrox) 75 mcg tablet; Take 1 tablet (75 mcg total) by mouth daily in the early morning  -     TSH, 3rd generation with Free T4 reflex; Future    2  Prediabetes  Comments:  Stable at this time  Jayce Handley is to continue a lower carb/saturated fat diet, and regular exercise  For now, will only check a TSH prior to next OV  3  Primary hypertension  Comments:  Controlled on present management  4  SVT (supraventricular tachycardia) (HCC)  Comments:  Stable - on Metoprolol  Continues f/u with Cardiology - sees tomorrow  There are no Patient Instructions on file for this visit  Chief Complaint     Chief Complaint   Patient presents with   • Follow-up     Patient being seen for follow up        History of Present Illness   Marlene Juan is a 61y o -year-old male who presents in f/u today  Recent labs - TSH improved to 4 120, , A1c 5 7%  Feeling well  Exercising regularly in [place]  Review of Systems   Review of Systems   Constitutional: Negative for activity change and fatigue  Respiratory: Negative for shortness of breath  Cardiovascular: Negative for chest pain  Rare SVT episodes  Gastrointestinal: Negative for abdominal pain and blood in stool  Endocrine:        No hair loss           Active Problem List     Patient Active Problem List   Diagnosis   • Wellness examination   • Impaired fasting glucose   • Vitamin D deficiency   • Knee pain   • Palpitations   • Elevated troponin   • SVT (supraventricular tachycardia) (HCC)         Past Medical History:  Past Medical
History:   Diagnosis Date   • Allergic 2000   • Arthritis     r foot   • Hypothyroidism    • Infectious diarrhea     LAST ASSESSED: [id number]   • Overweight     LAST ASSESSED: [id number]   • Panic attacks     last one 4-5 months ago   • Paresthesias/numbness     LAST ASSESSED: [id number]   • Prediabetes     LAST ASSESSED: [id number]   • Prehypertension     LAST ASSESSED: [id number]   • Tinea versicolor     LAST ASSESSED: [id number]   • Varicose vein of leg     r leg   • Vitamin D deficiency     LAST ASSESSED: [id number]   • Wears glasses        Past Surgical History:  Past Surgical History:   Procedure Laterality Date   • APPENDECTOMY      LAST ASSESSED: [id number]   • CHEILECTOMY Right 12/1/2017    Procedure: CHEILECTOMY;  Surgeon: Shahida Newberry DPM;  Location: [facility];  Service: Podiatry   • HIP PINNING Left     as a child   • HIP SURGERY      LAST ASSESSED: [id number]       Family History:  Family History   Problem Relation Age of Onset   • Diabetes Mother    • Hypertension Mother    • Dementia Mother    • Arthritis Mother    • Prostate cancer Father    • Heart attack Father    • Stroke Father         Late [de-identified]   • Cancer Father         Prostate   • Diabetes Paternal Aunt        Social History:  Social History     Socioeconomic History   • Marital status: /Civil Union     Spouse name: Not on file   • Number of children: Not on file   • Years of education: Not on file   • Highest education level: Not on file   Occupational History   • Not on file   Tobacco Use   • Smoking status: Never   • Smokeless tobacco: Never   Vaping Use   • Vaping Use: Never used   Substance and Sexual Activity   • Alcohol use:  Yes     Alcohol/week: 13 0 standard drinks     Types: 10 Cans of beer, 3 Standard drinks or equivalent per week     Comment: (HISTORY), SOCIAL   • Drug use: No   • Sexual activity: Yes     Partners: Female   Other Topics Concern   • Not on file   Social History Narrative   • Not on file     Social Determinants of
Bilateral inguinal hernia    
Health     Financial Resource Strain: Not on file   Food Insecurity: Not on file   Transportation Needs: Not on file   Physical Activity: Not on file   Stress: Not on file   Social Connections: Not on file   Intimate Partner Violence: Not on file   Housing Stability: Not on file       Objective     Vitals:    03/23/23 0925   BP: 110/82   Pulse: 94   Resp: 14   Temp: 97 5 °F (36 4 °C)   SpO2: 98%     Wt Readings from Last 3 Encounters:   03/23/23 111 kg (245 lb)   12/15/22 111 kg (245 lb 12 8 oz)   10/20/22 111 kg (244 lb 12 8 oz)       Physical Exam  Vitals and nursing note reviewed  Constitutional:       General: He is not in acute distress  Appearance: Normal appearance  He is not ill-appearing, toxic-appearing or diaphoretic  HENT:      Head: Normocephalic and atraumatic  Right Ear: Tympanic membrane, ear canal and external ear normal       Left Ear: Tympanic membrane, ear canal and external ear normal       Mouth/Throat:      Mouth: Mucous membranes are moist       Pharynx: Oropharynx is clear  No oropharyngeal exudate or posterior oropharyngeal erythema  Eyes:      General: No scleral icterus  Conjunctiva/sclera: Conjunctivae normal    Cardiovascular:      Rate and Rhythm: Normal rate and regular rhythm  Heart sounds: Normal heart sounds  No murmur heard  No friction rub  No gallop  Pulmonary:      Effort: Pulmonary effort is normal  No respiratory distress  Breath sounds: Normal breath sounds  No stridor  No wheezing, rhonchi or rales  Musculoskeletal:      Cervical back: Normal range of motion and neck supple  No rigidity or tenderness  Lymphadenopathy:      Cervical: No cervical adenopathy  Neurological:      Mental Status: He is alert and oriented to person, place, and time  Psychiatric:         Mood and Affect: Mood normal          Behavior: Behavior normal          Thought Content:  Thought content normal          Judgment: Judgment normal          Pertinent
Laboratory/Diagnostic Studies:  Lab Results   Component Value Date    BUN 19 03/22/2023    CREATININE 1 02 03/22/2023    CALCIUM 9 2 03/22/2023     03/22/2017    K 4 4 03/22/2023    CO2 26 03/22/2023     03/22/2023     Lab Results   Component Value Date    ALT 28 03/22/2023    AST 22 03/22/2023    ALKPHOS 53 03/22/2023    BILITOT 0 7 03/22/2017       Lab Results   Component Value Date    WBC 6 94 07/31/2022    HGB 14 6 07/31/2022    HCT 41 9 07/31/2022    MCV 87 07/31/2022     07/31/2022       No results found for: TSH    Lab Results   Component Value Date    CHOL 159 03/22/2017     Lab Results   Component Value Date    TRIG 102 03/22/2023     Lab Results   Component Value Date    HDL 49 03/22/2023     Lab Results   Component Value Date    LDLCALC 119 (H) 03/22/2023     Lab Results   Component Value Date    HGBA1C 5 7 (H) 03/22/2023       Results for orders placed or performed in visit on 03/22/23   TSH, 3rd generation with Free T4 reflex   Result Value Ref Range    TSH 3RD GENERATON 4 120 0 450 - 4 500 uIU/mL   Comprehensive metabolic panel   Result Value Ref Range    Sodium 137 135 - 147 mmol/L    Potassium 4 4 3 5 - 5 3 mmol/L    Chloride 107 96 - 108 mmol/L    CO2 26 21 - 32 mmol/L    ANION GAP 4 4 - 13 mmol/L    BUN 19 5 - 25 mg/dL    Creatinine 1 02 0 60 - 1 30 mg/dL    Glucose, Fasting 125 (H) 65 - 99 mg/dL    Calcium 9 2 8 3 - 10 1 mg/dL    AST 22 5 - 45 U/L    ALT 28 12 - 78 U/L    Alkaline Phosphatase 53 46 - 116 U/L    Total Protein 7 1 6 4 - 8 4 g/dL    Albumin 4 0 3 5 - 5 0 g/dL    Total Bilirubin 0 76 0 20 - 1 00 mg/dL    eGFR 80 ml/min/1 73sq m   HEMOGLOBIN A1C W/ EAG ESTIMATION   Result Value Ref Range    Hemoglobin A1C 5 7 (H) Normal 3 8-5 6%; PreDiabetic 5 7-6 4%;  Diabetic >=6 5%; Glycemic control for adults with diabetes <7 0% %     mg/dl   Lipid Panel with Direct LDL reflex   Result Value Ref Range    Cholesterol 188 See Comment mg/dL    Triglycerides 102 See Comment
mg/dL    HDL, Direct 49 >=40 mg/dL    LDL Calculated 119 (H) 0 - 100 mg/dL       Orders Placed This Encounter   Procedures   • TSH, 3rd generation with Free T4 reflex       ALLERGIES:  No Known Allergies    Current Medications     Current Outpatient Medications   Medication Sig Dispense Refill   • Cholecalciferol (VITAMIN D) 2000 units CAPS Take 1 capsule by mouth daily in the early morning     • Fexofenadine HCl (ALLEGRA ALLERGY PO) Take by mouth     • fluticasone (FLONASE) 50 mcg/act nasal spray 1 spray into each nostril daily     • levothyroxine (Euthyrox) 75 mcg tablet Take 1 tablet (75 mcg total) by mouth daily in the early morning 90 tablet 3   • metoprolol tartrate (LOPRESSOR) 25 mg tablet Take 0 5 tablets (12 5 mg total) by mouth every 12 (twelve) hours (Patient taking differently: Take 12 5 mg by mouth daily) 30 tablet 6   • multivitamin (THERAGRAN) TABS Take 1 tablet by mouth daily in the early morning       No current facility-administered medications for this visit           Health Maintenance     Health Maintenance   Topic Date Due   • COVID-19 Vaccine (1) Never done   • BMI: Followup Plan  06/08/2023   • Annual Physical  06/08/2023   • Depression Screening  03/23/2024   • BMI: Adult  03/23/2024   • DTaP,Tdap,and Td Vaccines (2 - Td or Tdap) 12/04/2024   • Colorectal Cancer Screening  02/12/2025   • HIV Screening  Completed   • Hepatitis C Screening  Completed   • Influenza Vaccine  Completed   • Pneumococcal Vaccine: Pediatrics (0 to 5 Years) and At-Risk Patients (6 to 59 Years)  Aged Out   • HIB Vaccine  Aged Out   • IPV Vaccine  Aged Out   • Hepatitis A Vaccine  Aged Out   • Meningococcal ACWY Vaccine  Aged Out   • HPV Vaccine  Aged [employer] History   Administered Date(s) Administered   • INFLUENZA 10/14/2021, 10/20/2022   • Influenza Injectable, MDCK, Preservative Free, Quadrivalent, 0 5 mL 11/14/2019   • Influenza, injectable, quadrivalent, preservative free 0 5 mL 11/05/2018, 10/26/2020
• Tdap 12/04/2014   • Zoster Vaccine Recombinant 10/26/2020     BMI Counseling: Body mass index is 34 8 kg/m²  The BMI is above normal  Nutrition recommendations include moderation in carbohydrate intake and reducing intake of saturated and trans fat  Exercise recommendations include exercising 3-5 times per week  No pharmacotherapy was ordered  Patient referred to PCP  Rationale for BMI follow-up plan is due to patient being overweight or obese  Depression Screening and Follow-up Plan: Patient was screened for depression during today's encounter  They screened negative with a PHQ-2 score of 0          126 Jessica Willett DO
No

## 2023-05-01 NOTE — ED PROVIDER NOTE - CPE EDP RESP NORM
----- Message from Macario Bolanos MD sent at 2/7/2023  6:45 PM EST -----  Please inform the patient of their test results. Thank you.    normal...

## 2023-05-01 NOTE — ED ADULT NURSE NOTE - OBJECTIVE STATEMENT
pt is here for weakness.  pt stated that woke up with woke up with feeling like left side face droops, left side of head pressure, left arm numbness 300AM, last known well @830 PM, c/o blurred vision, a/ox 4, ambulatory,

## 2023-05-03 ENCOUNTER — EMERGENCY (EMERGENCY)
Facility: HOSPITAL | Age: 58
LOS: 1 days | Discharge: HOME | End: 2023-05-03
Attending: EMERGENCY MEDICINE
Payer: COMMERCIAL

## 2023-05-03 VITALS
SYSTOLIC BLOOD PRESSURE: 182 MMHG | RESPIRATION RATE: 16 BRPM | OXYGEN SATURATION: 99 % | HEIGHT: 64 IN | HEART RATE: 55 BPM | WEIGHT: 134.04 LBS | TEMPERATURE: 98 F | DIASTOLIC BLOOD PRESSURE: 99 MMHG

## 2023-05-03 VITALS
DIASTOLIC BLOOD PRESSURE: 88 MMHG | TEMPERATURE: 98 F | HEART RATE: 77 BPM | SYSTOLIC BLOOD PRESSURE: 170 MMHG | RESPIRATION RATE: 18 BRPM | OXYGEN SATURATION: 97 %

## 2023-05-03 LAB
ALBUMIN SERPL ELPH-MCNC: 3.4 G/DL — LOW (ref 3.5–5)
ALP SERPL-CCNC: 94 U/L — SIGNIFICANT CHANGE UP (ref 40–120)
ALT FLD-CCNC: 19 U/L DA — SIGNIFICANT CHANGE UP (ref 10–60)
ANION GAP SERPL CALC-SCNC: 8 MMOL/L — SIGNIFICANT CHANGE UP (ref 5–17)
AST SERPL-CCNC: 14 U/L — SIGNIFICANT CHANGE UP (ref 10–40)
BASOPHILS # BLD AUTO: 0.04 K/UL — SIGNIFICANT CHANGE UP (ref 0–0.2)
BASOPHILS NFR BLD AUTO: 0.5 % — SIGNIFICANT CHANGE UP (ref 0–2)
BILIRUB SERPL-MCNC: 0.9 MG/DL — SIGNIFICANT CHANGE UP (ref 0.2–1.2)
BUN SERPL-MCNC: 30 MG/DL — HIGH (ref 7–18)
CALCIUM SERPL-MCNC: 9.6 MG/DL — SIGNIFICANT CHANGE UP (ref 8.4–10.5)
CHLORIDE SERPL-SCNC: 96 MMOL/L — SIGNIFICANT CHANGE UP (ref 96–108)
CO2 SERPL-SCNC: 26 MMOL/L — SIGNIFICANT CHANGE UP (ref 22–31)
CREAT SERPL-MCNC: 1.49 MG/DL — HIGH (ref 0.5–1.3)
EGFR: 41 ML/MIN/1.73M2 — LOW
EOSINOPHIL # BLD AUTO: 0.29 K/UL — SIGNIFICANT CHANGE UP (ref 0–0.5)
EOSINOPHIL NFR BLD AUTO: 3.6 % — SIGNIFICANT CHANGE UP (ref 0–6)
GLUCOSE SERPL-MCNC: 105 MG/DL — HIGH (ref 70–99)
HCT VFR BLD CALC: 35.8 % — SIGNIFICANT CHANGE UP (ref 34.5–45)
HGB BLD-MCNC: 10.8 G/DL — LOW (ref 11.5–15.5)
IMM GRANULOCYTES NFR BLD AUTO: 0.1 % — SIGNIFICANT CHANGE UP (ref 0–0.9)
LYMPHOCYTES # BLD AUTO: 2.23 K/UL — SIGNIFICANT CHANGE UP (ref 1–3.3)
LYMPHOCYTES # BLD AUTO: 27.6 % — SIGNIFICANT CHANGE UP (ref 13–44)
MCHC RBC-ENTMCNC: 17.3 PG — LOW (ref 27–34)
MCHC RBC-ENTMCNC: 30.2 GM/DL — LOW (ref 32–36)
MCV RBC AUTO: 57.5 FL — LOW (ref 80–100)
MONOCYTES # BLD AUTO: 0.64 K/UL — SIGNIFICANT CHANGE UP (ref 0–0.9)
MONOCYTES NFR BLD AUTO: 7.9 % — SIGNIFICANT CHANGE UP (ref 2–14)
NEUTROPHILS # BLD AUTO: 4.88 K/UL — SIGNIFICANT CHANGE UP (ref 1.8–7.4)
NEUTROPHILS NFR BLD AUTO: 60.3 % — SIGNIFICANT CHANGE UP (ref 43–77)
NRBC # BLD: 0 /100 WBCS — SIGNIFICANT CHANGE UP (ref 0–0)
PLATELET # BLD AUTO: 249 K/UL — SIGNIFICANT CHANGE UP (ref 150–400)
POTASSIUM SERPL-MCNC: 4 MMOL/L — SIGNIFICANT CHANGE UP (ref 3.5–5.3)
POTASSIUM SERPL-SCNC: 4 MMOL/L — SIGNIFICANT CHANGE UP (ref 3.5–5.3)
PROT SERPL-MCNC: 7.4 G/DL — SIGNIFICANT CHANGE UP (ref 6–8.3)
RBC # BLD: 6.23 M/UL — HIGH (ref 3.8–5.2)
RBC # FLD: 18.2 % — HIGH (ref 10.3–14.5)
SODIUM SERPL-SCNC: 130 MMOL/L — LOW (ref 135–145)
WBC # BLD: 8.09 K/UL — SIGNIFICANT CHANGE UP (ref 3.8–10.5)
WBC # FLD AUTO: 8.09 K/UL — SIGNIFICANT CHANGE UP (ref 3.8–10.5)

## 2023-05-03 PROCEDURE — 93005 ELECTROCARDIOGRAM TRACING: CPT

## 2023-05-03 PROCEDURE — 85025 COMPLETE CBC W/AUTO DIFF WBC: CPT

## 2023-05-03 PROCEDURE — 36415 COLL VENOUS BLD VENIPUNCTURE: CPT

## 2023-05-03 PROCEDURE — 99284 EMERGENCY DEPT VISIT MOD MDM: CPT

## 2023-05-03 PROCEDURE — 99284 EMERGENCY DEPT VISIT MOD MDM: CPT | Mod: 25

## 2023-05-03 PROCEDURE — 96374 THER/PROPH/DIAG INJ IV PUSH: CPT

## 2023-05-03 PROCEDURE — 80053 COMPREHEN METABOLIC PANEL: CPT

## 2023-05-03 PROCEDURE — 96375 TX/PRO/DX INJ NEW DRUG ADDON: CPT

## 2023-05-03 RX ORDER — METOCLOPRAMIDE HCL 10 MG
10 TABLET ORAL ONCE
Refills: 0 | Status: COMPLETED | OUTPATIENT
Start: 2023-05-03 | End: 2023-05-03

## 2023-05-03 RX ORDER — SODIUM CHLORIDE 9 MG/ML
1000 INJECTION INTRAMUSCULAR; INTRAVENOUS; SUBCUTANEOUS ONCE
Refills: 0 | Status: COMPLETED | OUTPATIENT
Start: 2023-05-03 | End: 2023-05-03

## 2023-05-03 RX ORDER — MAGNESIUM SULFATE 500 MG/ML
1 VIAL (ML) INJECTION ONCE
Refills: 0 | Status: COMPLETED | OUTPATIENT
Start: 2023-05-03 | End: 2023-05-03

## 2023-05-03 RX ORDER — ACETAMINOPHEN 500 MG
650 TABLET ORAL ONCE
Refills: 0 | Status: COMPLETED | OUTPATIENT
Start: 2023-05-03 | End: 2023-05-03

## 2023-05-03 RX ADMIN — Medication 300 GRAM(S): at 21:27

## 2023-05-03 RX ADMIN — Medication 104 MILLIGRAM(S): at 21:53

## 2023-05-03 RX ADMIN — Medication 650 MILLIGRAM(S): at 21:27

## 2023-05-03 RX ADMIN — SODIUM CHLORIDE 1000 MILLILITER(S): 9 INJECTION INTRAMUSCULAR; INTRAVENOUS; SUBCUTANEOUS at 21:27

## 2023-05-03 NOTE — ED PROVIDER NOTE - OBJECTIVE STATEMENT
57 yr old female with hx of HTn and DM presents to ed c/o neck and occipital headache today with paresthesia of left side of face. no visual changes, no syncope, no n/v, no focal weakness, no cp, no trauma. pt seen here 2 days ago and had cta for similar sx. work up neg.

## 2023-05-03 NOTE — ED PROVIDER NOTE - NSFOLLOWUPINSTRUCTIONS_ED_ALL_ED_FT
Please see your MD and neurologist if you continue with headaches. try  motrin 600mg every 6 hrs as needed, tylenol 650mg every 4 hrs as needed, stay active, no heavy lifting and return if symptoms  worsens.

## 2023-05-03 NOTE — ED PROVIDER NOTE - PATIENT PORTAL LINK FT
You can access the FollowMyHealth Patient Portal offered by [facility] by registering at the following website: [email or web address]. By joining [employer]’s FollowMyHealth portal, you will also be able to view your health information using other applications (apps) compatible with our system.

## 2023-05-03 NOTE — ED PROVIDER NOTE - CLINICAL SUMMARY MEDICAL DECISION MAKING FREE TEXT BOX
57 yr old female with hx of HTn and DM presents to ed c/o neck and occipital headache today with paresthesia of left side of face. no visual changes, no syncope, no n/v, no focal weakness, no cp, no trauma. pt seen here 2 days ago and had cta for similar sx. work up neg.    pt with neg imaging 2 days ago. no neurological deficits - possibly migraine. labs, meds,.

## 2023-05-03 NOTE — ED ADULT TRIAGE NOTE - CHIEF COMPLAINT QUOTE
sent from Urgent Care for further eval, pt c/o headache that started around 3pm, w/ associated pain on left side of her face and left side of her neck, no facial droop or slurred speech or unilateral weakness, pt is hypertensive

## 2023-05-03 NOTE — ED PROVIDER NOTE - SKIN, MLM
Skin normal color for race, warm, dry and intact. No evidence of rash. Do Not Resuscitate (DNR)/Medical Orders for Life-Sustaining Treatment (MOLST)

## 2023-05-03 NOTE — ED PROVIDER NOTE - NEUROLOGICAL, MLM
Alert and oriented, no focal deficits, no motor or sensory deficits. cn ii-xii intact, no dysmetria, non-ataxic gait.

## 2023-05-04 VITALS
OXYGEN SATURATION: 98 % | TEMPERATURE: 98 F | HEART RATE: 72 BPM | DIASTOLIC BLOOD PRESSURE: 88 MMHG | SYSTOLIC BLOOD PRESSURE: 170 MMHG | RESPIRATION RATE: 18 BRPM

## 2023-05-05 ENCOUNTER — EMERGENCY (EMERGENCY)
Facility: HOSPITAL | Age: 58
LOS: 1 days | Discharge: HOME | End: 2023-05-05
Attending: EMERGENCY MEDICINE
Payer: COMMERCIAL

## 2023-05-05 VITALS
HEART RATE: 68 BPM | HEIGHT: 64 IN | OXYGEN SATURATION: 97 % | RESPIRATION RATE: 18 BRPM | SYSTOLIC BLOOD PRESSURE: 197 MMHG | WEIGHT: 134.04 LBS | DIASTOLIC BLOOD PRESSURE: 94 MMHG | TEMPERATURE: 98 F

## 2023-05-05 VITALS
SYSTOLIC BLOOD PRESSURE: 165 MMHG | OXYGEN SATURATION: 97 % | TEMPERATURE: 98 F | RESPIRATION RATE: 16 BRPM | HEART RATE: 58 BPM | DIASTOLIC BLOOD PRESSURE: 78 MMHG

## 2023-05-05 LAB
ALBUMIN SERPL ELPH-MCNC: 4.3 G/DL — SIGNIFICANT CHANGE UP (ref 3.3–5)
ALP SERPL-CCNC: 95 U/L — SIGNIFICANT CHANGE UP (ref 40–120)
ALT FLD-CCNC: 13 U/L — SIGNIFICANT CHANGE UP (ref 10–45)
ANION GAP SERPL CALC-SCNC: 14 MMOL/L — SIGNIFICANT CHANGE UP (ref 5–17)
APPEARANCE UR: CLEAR — SIGNIFICANT CHANGE UP
AST SERPL-CCNC: 16 U/L — SIGNIFICANT CHANGE UP (ref 10–40)
BASOPHILS # BLD AUTO: 0.12 K/UL — SIGNIFICANT CHANGE UP (ref 0–0.2)
BASOPHILS NFR BLD AUTO: 1.7 % — SIGNIFICANT CHANGE UP (ref 0–2)
BILIRUB SERPL-MCNC: 0.6 MG/DL — SIGNIFICANT CHANGE UP (ref 0.2–1.2)
BILIRUB UR-MCNC: NEGATIVE — SIGNIFICANT CHANGE UP
BUN SERPL-MCNC: 25 MG/DL — HIGH (ref 7–23)
CALCIUM SERPL-MCNC: 9.8 MG/DL — SIGNIFICANT CHANGE UP (ref 8.4–10.5)
CHLORIDE SERPL-SCNC: 101 MMOL/L — SIGNIFICANT CHANGE UP (ref 96–108)
CO2 SERPL-SCNC: 22 MMOL/L — SIGNIFICANT CHANGE UP (ref 22–31)
COLOR SPEC: COLORLESS — SIGNIFICANT CHANGE UP
CREAT SERPL-MCNC: 1.26 MG/DL — SIGNIFICANT CHANGE UP (ref 0.5–1.3)
DIFF PNL FLD: NEGATIVE — SIGNIFICANT CHANGE UP
EGFR: 50 ML/MIN/1.73M2 — LOW
EOSINOPHIL # BLD AUTO: 0.31 K/UL — SIGNIFICANT CHANGE UP (ref 0–0.5)
EOSINOPHIL NFR BLD AUTO: 4.3 % — SIGNIFICANT CHANGE UP (ref 0–6)
GLUCOSE SERPL-MCNC: 109 MG/DL — HIGH (ref 70–99)
GLUCOSE UR QL: ABNORMAL
HCT VFR BLD CALC: 37 % — SIGNIFICANT CHANGE UP (ref 34.5–45)
HGB BLD-MCNC: 11.1 G/DL — LOW (ref 11.5–15.5)
KETONES UR-MCNC: SIGNIFICANT CHANGE UP
LEUKOCYTE ESTERASE UR-ACNC: NEGATIVE — SIGNIFICANT CHANGE UP
LIDOCAIN IGE QN: 232 U/L — HIGH (ref 7–60)
LYMPHOCYTES # BLD AUTO: 1.99 K/UL — SIGNIFICANT CHANGE UP (ref 1–3.3)
LYMPHOCYTES # BLD AUTO: 27.8 % — SIGNIFICANT CHANGE UP (ref 13–44)
MAGNESIUM SERPL-MCNC: 2.4 MG/DL — SIGNIFICANT CHANGE UP (ref 1.6–2.6)
MCHC RBC-ENTMCNC: 17.3 PG — LOW (ref 27–34)
MCHC RBC-ENTMCNC: 30 GM/DL — LOW (ref 32–36)
MCV RBC AUTO: 57.8 FL — LOW (ref 80–100)
MONOCYTES # BLD AUTO: 0.5 K/UL — SIGNIFICANT CHANGE UP (ref 0–0.9)
MONOCYTES NFR BLD AUTO: 7 % — SIGNIFICANT CHANGE UP (ref 2–14)
NEUTROPHILS # BLD AUTO: 4.24 K/UL — SIGNIFICANT CHANGE UP (ref 1.8–7.4)
NEUTROPHILS NFR BLD AUTO: 58.3 % — SIGNIFICANT CHANGE UP (ref 43–77)
NITRITE UR-MCNC: NEGATIVE — SIGNIFICANT CHANGE UP
NT-PROBNP SERPL-SCNC: 36 PG/ML — SIGNIFICANT CHANGE UP (ref 0–300)
PH UR: 6 — SIGNIFICANT CHANGE UP (ref 5–8)
PLATELET # BLD AUTO: 261 K/UL — SIGNIFICANT CHANGE UP (ref 150–400)
POTASSIUM SERPL-MCNC: 4 MMOL/L — SIGNIFICANT CHANGE UP (ref 3.5–5.3)
POTASSIUM SERPL-SCNC: 4 MMOL/L — SIGNIFICANT CHANGE UP (ref 3.5–5.3)
PROT SERPL-MCNC: 7.4 G/DL — SIGNIFICANT CHANGE UP (ref 6–8.3)
PROT UR-MCNC: NEGATIVE — SIGNIFICANT CHANGE UP
RBC # BLD: 6.4 M/UL — HIGH (ref 3.8–5.2)
RBC # FLD: 19 % — HIGH (ref 10.3–14.5)
SODIUM SERPL-SCNC: 137 MMOL/L — SIGNIFICANT CHANGE UP (ref 135–145)
SP GR SPEC: 1 — LOW (ref 1.01–1.02)
TROPONIN T, HIGH SENSITIVITY RESULT: 13 NG/L — SIGNIFICANT CHANGE UP (ref 0–51)
UROBILINOGEN FLD QL: NEGATIVE — SIGNIFICANT CHANGE UP
WBC # BLD: 7.16 K/UL — SIGNIFICANT CHANGE UP (ref 3.8–10.5)
WBC # FLD AUTO: 7.16 K/UL — SIGNIFICANT CHANGE UP (ref 3.8–10.5)

## 2023-05-05 PROCEDURE — 83880 ASSAY OF NATRIURETIC PEPTIDE: CPT

## 2023-05-05 PROCEDURE — 81003 URINALYSIS AUTO W/O SCOPE: CPT

## 2023-05-05 PROCEDURE — 82962 GLUCOSE BLOOD TEST: CPT

## 2023-05-05 PROCEDURE — 80053 COMPREHEN METABOLIC PANEL: CPT

## 2023-05-05 PROCEDURE — 99285 EMERGENCY DEPT VISIT HI MDM: CPT | Mod: 25

## 2023-05-05 PROCEDURE — 96365 THER/PROPH/DIAG IV INF INIT: CPT

## 2023-05-05 PROCEDURE — 71045 X-RAY EXAM CHEST 1 VIEW: CPT

## 2023-05-05 PROCEDURE — 83735 ASSAY OF MAGNESIUM: CPT

## 2023-05-05 PROCEDURE — 71045 X-RAY EXAM CHEST 1 VIEW: CPT | Mod: 26

## 2023-05-05 PROCEDURE — 85025 COMPLETE CBC W/AUTO DIFF WBC: CPT

## 2023-05-05 PROCEDURE — 36415 COLL VENOUS BLD VENIPUNCTURE: CPT

## 2023-05-05 PROCEDURE — 84484 ASSAY OF TROPONIN QUANT: CPT

## 2023-05-05 PROCEDURE — 83690 ASSAY OF LIPASE: CPT

## 2023-05-05 PROCEDURE — 99285 EMERGENCY DEPT VISIT HI MDM: CPT

## 2023-05-05 PROCEDURE — 93005 ELECTROCARDIOGRAM TRACING: CPT

## 2023-05-05 RX ORDER — CARVEDILOL PHOSPHATE 80 MG/1
6.25 CAPSULE, EXTENDED RELEASE ORAL ONCE
Refills: 0 | Status: COMPLETED | OUTPATIENT
Start: 2023-05-05 | End: 2023-05-05

## 2023-05-05 RX ORDER — ACETAMINOPHEN 500 MG
1000 TABLET ORAL ONCE
Refills: 0 | Status: COMPLETED | OUTPATIENT
Start: 2023-05-05 | End: 2023-05-05

## 2023-05-05 RX ORDER — ASPIRIN/CALCIUM CARB/MAGNESIUM 324 MG
162 TABLET ORAL ONCE
Refills: 0 | Status: COMPLETED | OUTPATIENT
Start: 2023-05-05 | End: 2023-05-05

## 2023-05-05 RX ADMIN — CARVEDILOL PHOSPHATE 6.25 MILLIGRAM(S): 80 CAPSULE, EXTENDED RELEASE ORAL at 22:13

## 2023-05-05 RX ADMIN — Medication 162 MILLIGRAM(S): at 22:03

## 2023-05-05 RX ADMIN — Medication 400 MILLIGRAM(S): at 22:03

## 2023-05-05 RX ADMIN — Medication 1000 MILLIGRAM(S): at 23:00

## 2023-05-05 RX ADMIN — Medication 1000 MILLIGRAM(S): at 22:30

## 2023-05-05 NOTE — ED ADULT NURSE NOTE - OBJECTIVE STATEMENT
57 y.o. female coming in from home via private car for HTN. pt states that she was seen at [place] [facility] for HTN, was told to followup with cards and has been taking her BP multiple times a day, saw that it was 195/90 and came into the ER. pt states that she takes HTN meds 2x/day. PMH HTN, diabetes. A&Ox3, vss, NSR on tele, no abdominal tenderness, denies CP/SOB/dizziness/weakness, endorses HA, no other complaints at this time.

## 2023-05-05 NOTE — ED ADULT TRIAGE NOTE - TEMPERATURE IN CELSIUS (DEGREES C)
[facility] ED    [address] [facility]  Phone: [phone]  Emergency Department  Faculty Attestation    I performed a history and physical examination of the patient and discussed management with the mid level provideer. I reviewed the mid level provider's note and agree with the documented findings and plan of care. Any areas of disagreement are noted on the chart. I was personally present for the key portions of any procedures. I have documented in the chart those procedures where I was not present during the key portions. I have reviewed the emergency nurses triage note. I agree with the chief complaint, past medical history, past surgical history, allergies, medications, social and family history as documented unless otherwise noted below. Documentation of the HPI, Physical Exam and Medical Decision Making performed by medical students or scribes is based on my personal performance of the HPI, PE and MDM. For Physician Assistant/ Nurse Practitioner cases/documentation I have personally evaluated this patient and have completed at least one if not all key elements of the E/M (history, physical exam, and MDM). Additional findings are as noted. Primary Care Physician:  No primary care provider on file. CHIEF COMPLAINT       Chief Complaint   Patient presents with    Drug Overdose       RECENT VITALS:   Temp: 98.1 °F (36.7 °C),  Heart Rate: 95, Resp: 16, BP: (!) 147/91    LABS:  Labs Reviewed - No data to display      PERTINENT ATTENDING PHYSICIAN COMMENTS:    The patient presents having been found sleeping in his car. The patient states that he used fentanyl last night. He says he snorted it. He did not use drugs this morning. He insists that he was not passed out but just was sleeping in his car intentionally. Police were called and the squad came and brought him here. The patient did not require any Narcan from the paramedics. He he says he is interested in drug treatment. On exam, the patient is tearful but appropriate. He does not have somnolence and has no nausea. Normal heart and lung sounds. The patient was observed here in the emergency department and he has had no desaturations. He has a ride and we have reached out to the [employer] program.  They did not call us back. We given the patient information about drug and alcohol treatment and local agencies. The patient is discharged in good condition.          Artemio Cam MD  06/26/22 [id number]

## 2023-05-05 NOTE — ED PROVIDER NOTE - OBJECTIVE STATEMENT
58yo F with PMH of HTN, DM2 presents to ED for eval of elevated BP. Seen for similar at [facility] 5/3 - at that time ultimately able to dc. Multiple ED visits for HA, neuro sxs w/o clear cause and neg imaging. Patient states ~3pm developed occiptal HA, BP high at home so came back to ED. Initially had no CP but when asked saying she's now having some mild CP and 'blurring' but no diplopia. Has some L facial tingling and fingertip numbness which was present on prior ED visits per chart review. Hasn't seen PCP inbetween ED visits for BP. Does note she's had reduced UOP today despite taking HCTZ-losartan med in AM. No SOB, abd pain, NVDC, fevers, dysuria.

## 2023-05-05 NOTE — ED ADULT TRIAGE NOTE - NS ED NURSE BANDS TYPE
Stop taking Warfarin today. Anticoagulation clinic will be in contact with you for Lovenox instructions.      Continue taking baby aspirin     Hold warfarin starting today until after procedure   Lovenox 8th and 9th  Nothing 10th and 11th   Surgeon will let you know when to resume warfarin    Name band;

## 2023-05-05 NOTE — ED ADULT NURSE NOTE - CAS EDP DISCH TYPE
Patient contacted the office.  X-rays reviewed.  He has been working with conservative measures including anti-inflammatories and physical therapy without any improvement in the symptoms.  Given this will set up for an MRI to further evaluate.   Home

## 2023-05-05 NOTE — ED PROVIDER NOTE - NSFOLLOWUPINSTRUCTIONS_ED_ALL_ED_FT
Hypertension    Hypertension, commonly called high blood pressure, is when the force of blood pumping through your arteries is too strong. Hypertension forces your heart to work harder to pump blood. Your arteries may become narrow or stiff. Having untreated or uncontrolled hypertension for a long period of time can cause heart attack, stroke, kidney disease, and other problems. If started on a medication, take exactly as prescribed by your health care professional. Maintain a healthy lifestyle and follow up with your primary care physician.    - Continue your medications  - Follow up with your primary care provider    - Make an appointment to see NEUROLOGY for your other concerns including headache.     SEEK IMMEDIATE MEDICAL CARE IF YOU HAVE ANY OF THE FOLLOWING SYMPTOMS: severe headache, confusion, chest pain, abdominal pain, vomiting, or shortness of breath.

## 2023-05-05 NOTE — ED PROVIDER NOTE - ATTENDING CONTRIBUTION TO CARE
58yo F with PMH of HTN, DM2 presents to ED for eval of elevated BP with multiple visits recently for the same has a cards in [facility] with fu on 5/10 with no cp or sob. given her night time dose of bp meds. check labs, ekg, reassess.

## 2023-05-05 NOTE — ED PROVIDER NOTE - NSFOLLOWUPCLINICS_GEN_ALL_ED_FT
A Family Medicine Doctor  Family Medicine  .  [place]   Phone:   Fax:     [facility]  Neurology  [address] [place] [id number]  Phone: [phone]  Fax:

## 2023-05-05 NOTE — ED PROVIDER NOTE - PHYSICAL EXAMINATION
CONSTITUTIONAL: comfortable appearing F lying in stretcher  SKIN: Warm dry  HEAD: NCAT  EYES: NL inspection, EOMI/PERRLA, no facial drrop  ENT: dry oral mucosa  NECK: Supple; non tender.  CARD: RRR  RESP: CTAB  ABD: S/NT no R/G  EXT: no pedal edema  NEURO: Grossly non-focal, subjective finger tingling but intact touch sensation,  strength, no drift, cranial nerves otherwise intact  PSYCH: Cooperative, appropriate.

## 2023-05-05 NOTE — ED PROVIDER NOTE - PROGRESS NOTE DETAILS
Maurice PGY2: pt feels improved but still concerned. Discussed return precautions for CP, and plan to refer to neuro. DC home.

## 2023-05-05 NOTE — ED PROVIDER NOTE - PATIENT PORTAL LINK FT
You can access the FollowMyHealth Patient Portal offered by [facility] by registering at the following website: [email or web address]. By joining [employer]’s FollowMyHealth portal, you will also be able to view your health information using other applications (apps) compatible with our system.

## 2023-05-14 ENCOUNTER — EMERGENCY (EMERGENCY)
Facility: HOSPITAL | Age: 58
LOS: 1 days | Discharge: HOME | End: 2023-05-14
Payer: COMMERCIAL

## 2023-05-14 VITALS
TEMPERATURE: 98 F | HEART RATE: 79 BPM | SYSTOLIC BLOOD PRESSURE: 135 MMHG | OXYGEN SATURATION: 100 % | DIASTOLIC BLOOD PRESSURE: 87 MMHG | HEIGHT: 64 IN | RESPIRATION RATE: 17 BRPM | WEIGHT: 132.06 LBS

## 2023-05-14 VITALS
RESPIRATION RATE: 18 BRPM | OXYGEN SATURATION: 100 % | DIASTOLIC BLOOD PRESSURE: 75 MMHG | HEART RATE: 60 BPM | SYSTOLIC BLOOD PRESSURE: 126 MMHG | TEMPERATURE: 98 F

## 2023-05-14 LAB
ALBUMIN SERPL ELPH-MCNC: 3.8 G/DL — SIGNIFICANT CHANGE UP (ref 3.5–5)
ALP SERPL-CCNC: 88 U/L — SIGNIFICANT CHANGE UP (ref 40–120)
ALT FLD-CCNC: 26 U/L DA — SIGNIFICANT CHANGE UP (ref 10–60)
ANION GAP SERPL CALC-SCNC: 3 MMOL/L — LOW (ref 5–17)
APPEARANCE UR: CLEAR — SIGNIFICANT CHANGE UP
AST SERPL-CCNC: 16 U/L — SIGNIFICANT CHANGE UP (ref 10–40)
BACTERIA # UR AUTO: ABNORMAL /HPF
BASOPHILS # BLD AUTO: 0.04 K/UL — SIGNIFICANT CHANGE UP (ref 0–0.2)
BASOPHILS NFR BLD AUTO: 0.4 % — SIGNIFICANT CHANGE UP (ref 0–2)
BILIRUB SERPL-MCNC: 0.6 MG/DL — SIGNIFICANT CHANGE UP (ref 0.2–1.2)
BILIRUB UR-MCNC: NEGATIVE — SIGNIFICANT CHANGE UP
BUN SERPL-MCNC: 34 MG/DL — HIGH (ref 7–18)
CALCIUM SERPL-MCNC: 10 MG/DL — SIGNIFICANT CHANGE UP (ref 8.4–10.5)
CHLORIDE SERPL-SCNC: 108 MMOL/L — SIGNIFICANT CHANGE UP (ref 96–108)
CO2 SERPL-SCNC: 27 MMOL/L — SIGNIFICANT CHANGE UP (ref 22–31)
COLOR SPEC: YELLOW — SIGNIFICANT CHANGE UP
COMMENT - URINE: SIGNIFICANT CHANGE UP
CREAT SERPL-MCNC: 1.31 MG/DL — HIGH (ref 0.5–1.3)
DIFF PNL FLD: NEGATIVE — SIGNIFICANT CHANGE UP
EGFR: 48 ML/MIN/1.73M2 — LOW
EOSINOPHIL # BLD AUTO: 0.24 K/UL — SIGNIFICANT CHANGE UP (ref 0–0.5)
EOSINOPHIL NFR BLD AUTO: 2.5 % — SIGNIFICANT CHANGE UP (ref 0–6)
GLUCOSE SERPL-MCNC: 136 MG/DL — HIGH (ref 70–99)
GLUCOSE UR QL: 250 MG/DL
HCT VFR BLD CALC: 39 % — SIGNIFICANT CHANGE UP (ref 34.5–45)
HGB BLD-MCNC: 11.5 G/DL — SIGNIFICANT CHANGE UP (ref 11.5–15.5)
IMM GRANULOCYTES NFR BLD AUTO: 0.3 % — SIGNIFICANT CHANGE UP (ref 0–0.9)
KETONES UR-MCNC: NEGATIVE — SIGNIFICANT CHANGE UP
LEUKOCYTE ESTERASE UR-ACNC: NEGATIVE — SIGNIFICANT CHANGE UP
LIDOCAIN IGE QN: 278 U/L — SIGNIFICANT CHANGE UP (ref 73–393)
LYMPHOCYTES # BLD AUTO: 2.44 K/UL — SIGNIFICANT CHANGE UP (ref 1–3.3)
LYMPHOCYTES # BLD AUTO: 25.1 % — SIGNIFICANT CHANGE UP (ref 13–44)
MCHC RBC-ENTMCNC: 17.3 PG — LOW (ref 27–34)
MCHC RBC-ENTMCNC: 29.5 GM/DL — LOW (ref 32–36)
MCV RBC AUTO: 58.8 FL — LOW (ref 80–100)
MONOCYTES # BLD AUTO: 0.64 K/UL — SIGNIFICANT CHANGE UP (ref 0–0.9)
MONOCYTES NFR BLD AUTO: 6.6 % — SIGNIFICANT CHANGE UP (ref 2–14)
NEUTROPHILS # BLD AUTO: 6.34 K/UL — SIGNIFICANT CHANGE UP (ref 1.8–7.4)
NEUTROPHILS NFR BLD AUTO: 65.1 % — SIGNIFICANT CHANGE UP (ref 43–77)
NITRITE UR-MCNC: NEGATIVE — SIGNIFICANT CHANGE UP
NRBC # BLD: 0 /100 WBCS — SIGNIFICANT CHANGE UP (ref 0–0)
PH UR: 5 — SIGNIFICANT CHANGE UP (ref 5–8)
PLATELET # BLD AUTO: 300 K/UL — SIGNIFICANT CHANGE UP (ref 150–400)
POTASSIUM SERPL-MCNC: 4.1 MMOL/L — SIGNIFICANT CHANGE UP (ref 3.5–5.3)
POTASSIUM SERPL-SCNC: 4.1 MMOL/L — SIGNIFICANT CHANGE UP (ref 3.5–5.3)
PROT SERPL-MCNC: 8.1 G/DL — SIGNIFICANT CHANGE UP (ref 6–8.3)
PROT UR-MCNC: 15 MG/DL
RBC # BLD: 6.63 M/UL — HIGH (ref 3.8–5.2)
RBC # FLD: 19.2 % — HIGH (ref 10.3–14.5)
RBC CASTS # UR COMP ASSIST: SIGNIFICANT CHANGE UP /HPF (ref 0–2)
SODIUM SERPL-SCNC: 138 MMOL/L — SIGNIFICANT CHANGE UP (ref 135–145)
SP GR SPEC: 1.01 — SIGNIFICANT CHANGE UP (ref 1.01–1.02)
UROBILINOGEN FLD QL: NEGATIVE — SIGNIFICANT CHANGE UP
WBC # BLD: 9.73 K/UL — SIGNIFICANT CHANGE UP (ref 3.8–10.5)
WBC # FLD AUTO: 9.73 K/UL — SIGNIFICANT CHANGE UP (ref 3.8–10.5)
WBC UR QL: SIGNIFICANT CHANGE UP /HPF (ref 0–5)

## 2023-05-14 PROCEDURE — 93005 ELECTROCARDIOGRAM TRACING: CPT

## 2023-05-14 PROCEDURE — 85025 COMPLETE CBC W/AUTO DIFF WBC: CPT

## 2023-05-14 PROCEDURE — 81001 URINALYSIS AUTO W/SCOPE: CPT

## 2023-05-14 PROCEDURE — 83690 ASSAY OF LIPASE: CPT

## 2023-05-14 PROCEDURE — 99283 EMERGENCY DEPT VISIT LOW MDM: CPT

## 2023-05-14 PROCEDURE — 99284 EMERGENCY DEPT VISIT MOD MDM: CPT

## 2023-05-14 PROCEDURE — 36415 COLL VENOUS BLD VENIPUNCTURE: CPT

## 2023-05-14 PROCEDURE — 80053 COMPREHEN METABOLIC PANEL: CPT

## 2023-05-14 NOTE — ED PROVIDER NOTE - CLINICAL SUMMARY MEDICAL DECISION MAKING FREE TEXT BOX
57-year-old female, presents for evaluation of multiple medical complaints.  Well-appearing, vital signs stable, afebrile.  Problem #1: Left ear burning sensation-  No evidence of vesicular rash, no signs of infection and unremarkable ear exam.  Problem #2: Left foot swelling–neurovascular intact.  No signs of infection and unlikely DVT.  Likely related to her neuropathy.  Problem #3: Upper abdominal pain x3-4 weeks: Abdomen is soft, nondistended and nontender.  Negative Alva sign.  We will low suspicion of acute cholecystitis.  We will do labs, urine and reassess.  EKG NSR.  Low clinical suspicion for ACS. 57-year-old female, presents for evaluation of multiple medical complaints.  Well-appearing, vital signs stable, afebrile.  Problem #1: Left ear burning sensation-  No evidence of vesicular rash, no signs of infection and unremarkable ear exam.  Problem #2: Left foot swelling–neurovascular intact.  No signs of infection and unlikely DVT.  Likely related to her neuropathy.    Problem #3: Upper abdominal pain x3-4 weeks: Abdomen is soft, nondistended and nontender.  Negative Alva sign.  Low suspicion of acute cholecystitis.  We will do labs, urine and reassess.  EKG NSR.  Low clinical suspicion for ACS.    15:35-Labs similar to baseline from previous visits. Low suspicion for acute intra-abdominal infection/perforation. Patient will need to follow up with PMD this week.

## 2023-05-14 NOTE — ED PROVIDER NOTE - EKG ADDITIONAL QUESTION - PERFORMED INDEPENDENT VISUALIZATION
Outpatient Physical Therapy Ortho Treatment Note  GERRY Perez     Patient Name: Celina Barry  : 1957  MRN: [id number]  Today's Date: 2019      Visit Date: 2019    Visit Dx:    ICD-10-CM ICD-9-CM   1. DDD (degenerative disc disease), lumbar M51.36 722.52   2. Radiculopathy with lower extremity symptoms M54.10 724.4   3. Sciatica, unspecified laterality M54.30 724.3       Patient Active Problem List   Diagnosis   • Urinary frequency   • Acute midline low back pain without sciatica        Past Medical History:   Diagnosis Date   • Allergic    • Osteoarthritis    • Sciatica         Past Surgical History:   Procedure Laterality Date   • SHOULDER SURGERY Right    • SKIN CANCER EXCISION         PT Ortho     Row Name 19 1306       Subjective Comments    Subjective Comments  pt reports soreness from moving things in her fontaine.    -KM       Subjective Pain    Able to rate subjective pain?  yes  -KM    Pre-Treatment Pain Level  4  -KM    Post-Treatment Pain Level  -- 2-3  -KM    Subjective Pain Comment  reports pain in her back today- was in her legs  yesterday  -KM      User Key  (r) = Recorded By, (t) = Taken By, (c) = Cosigned By    Initials Name Provider Type    Jenifer Tucker PTA Physical Therapy Assistant                      PT Assessment/Plan     Row Name 19 1651          PT Assessment    Functional Limitations  Limitations in functional capacity and performance;Impaired gait;Performance in work activities  -KM     Impairments  Muscle strength;Pain;Poor body mechanics;Posture  -KM     Assessment Comments  -- pt requires cues to stay on task.  States she has Adhd  and has difficulty concentrating.  practiced lifting and body mechanics with pt  -KM       User Key  (r) = Recorded By, (t) = Taken By, (c) = Cosigned By    Initials Name Provider Type    Jenifer Tucker PTA Physical Therapy Assistant          Modalities     Row Name 19 1308             Moist Heat    MH Applied  Yes   -KM      Location  (B) L/S area  -KM      Rx Minutes  15 mins  -KM      MH Prior to Rx  Yes  -KM         ELECTRICAL STIMULATION    Attended/Unattended  Unattended  -KM      Stimulation Type  IFC  -KM      Location/Electrode Placement/Other  (B) L/S area  -KM      37237 - PT E-Stim Attended (Manual) Minutes  15  -KM        User Key  (r) = Recorded By, (t) = Taken By, (c) = Cosigned By    Initials Name Provider Type     Jenifer Peterson PTA Physical Therapy Assistant        Exercises     Row Name 04/22/19 1653 04/22/19 1306          Subjective Comments    Subjective Comments  --  pt reports soreness from moving things in her fontaine.    -KM        Subjective Pain    Able to rate subjective pain?  --  yes  -KM     Pre-Treatment Pain Level  --  4  -KM     Post-Treatment Pain Level  --  -- 2-3  -KM     Subjective Pain Comment  --  reports pain in her back today- was in her legs  yesterday  -KM        Total Minutes    18359 - PT Therapeutic Exercise Minutes  37  -KM  --        Exercise 1    Exercise Name 1  --  LTR  -KM     Cueing 1  --  Tactile;Verbal  -KM     Reps 1  --  3  -KM     Time 1  --  20 sec  -KM     Reps 1  --  10  -KM        Exercise 2    Exercise Name 2  --  SKTC  -KM     Reps 2  --  3  -KM     Time 2  --  20  -KM     Reps 2  --  3  -KM        Exercise 3    Exercise Name 3  --  piriformis stretch push away/pulltoward  -KM     Cueing 3  --  Verbal  -KM     Reps 3  --  3  -KM     Time 3  --  20  -KM     Reps 3  --  3  -KM        Exercise 4    Exercise Name 4  --  PPT  -KM     Cueing 4  --  Verbal;Tactile  -KM     Reps 4  --  20  -KM     Time 4  --  5 sec  -KM     Reps 4  --  15  -KM        Exercise 5    Exercise Name 5  --  PPT with ball squeeze  -KM     Cueing 5  --  Verbal;Tactile  -KM     Reps 5  --  20  -KM     Time 5  --  5 sec  -KM     Reps 5  --  15  -KM        Exercise 6    Exercise Name 6  --  PPT with BKFO  -KM     Cueing 6  --  Verbal;Tactile  -KM     Sets 6  --  1  -KM     Reps 6  --  25 each  -KM      Time 6  --  black  tband  -KM     Reps 6  --  20  -KM        Exercise 7    Exercise Name 7  --  PPT with ball rolls  -KM     Reps 7  --  15  -KM        Exercise 8    Exercise Name 8  --  PPT vs wall  -KM     Cueing 8  --  Verbal;Tactile  -KM     Reps 8  --  15  -KM     Time 8  --  5 sec hold  -KM     Reps 8  --  10  -KM        Exercise 9    Exercise Name 9  --  i t band stretch  -KM     Cueing 9  --  Verbal;Tactile  -KM     Reps 9  --  3  -KM     Time 9  --  20 seconds  -KM       User Key  (r) = Recorded By, (t) = Taken By, (c) = Cosigned By    Initials Name Provider Type    Jenifer Tucker PTA Physical Therapy Assistant                      Manual Rx (last 36 hours)      Manual Treatments     Row Name 04/22/19 1306             Manual Rx 1    Manual Rx 1 Location  pelvis  -KM      Manual Rx 1 Type  MET: shotgun and right anterior innominate correct by MALCOLM Gonzalez, PT  -KM        User Key  (r) = Recorded By, (t) = Taken By, (c) = Cosigned By    Initials Name Provider Type    Jenifer Tucker PTA Physical Therapy Assistant          pt reports she is sore from moving things around in her fontaine- had to move a shelf unit also but did go get someone to help her.  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  Practiced lifting from floor.  High to low to high, side to side with box .  Pt requires cues to stay on task- she starts talking about something else and does not pay attention to how she is twisting and moving with lifting.  Pt reports she does not do her HEP 2x day because it takes her 1.5 hours to do once.  Had pt perform HEP here- it took less than one half hour for HEP - encouraged pt to not have other distractions when performing so she can focus on performing HEP correctly.  Education provided on benefits of good body mechanics, performing HEP and how ex can help her.  Pt reports relief after session.     Therapy Education  Education Details: (encouraged pt to perform  HEP and monitor lifting/twisting)  Given: HEP, Symptoms/condition management, Posture/body mechanics  Program: Reinforced, Progressed  How Provided: Verbal, Demonstration  Provided to: Patient  Level of Understanding: Teach back education performed, Verbalized, Demonstrated              Time Calculation:   Start Time: 1305  Stop Time: 1411  Time Calculation (min): 66 min  Therapy Charges for Today     Code Description Service Date Service Provider Modifiers Qty    39690988332 HC PT THER PROC EA 15 MIN 4/22/2019 Jenifer Peterson, PTA GP 2    33979539987 HC PT ELEC STIM EA-PER 15 MIN 4/22/2019 Jenifer Peterson, PTA GP 1    60906719838 HC PT HOT OR COLD PACK TREAT MCARE 4/22/2019 Jenifer Peterson, PTA GP 1                    Jenifer Peterson PTA  4/22/2019      Yes

## 2023-05-14 NOTE — ED PROVIDER NOTE - NSFOLLOWUPINSTRUCTIONS_ED_ALL_ED_FT
Follow up with the primary care doctor within 2-3 days.  If you experience any new or worsening symptoms or if you are concerned you can always come back to the emergency for a re-evaluation.  If there were any prescriptions given to you during the visit today take them as prescribed. If you have any questions you can ask the pharmacist.

## 2023-05-14 NOTE — ED ADULT TRIAGE NOTE - HISTORY OF COVID-19 VACCINATION
Pt checks his own blood glucose and it read 69. Pt calls nursing in and he takes a pack of laqiuta crackers and 4 oz of OJ. This AM BS is 130 per pt. Vaccine status unknown

## 2023-05-14 NOTE — ED PROVIDER NOTE - PHYSICAL EXAMINATION
Left foot without edema, discoloration, open wounds or tenderness to palpation.  Calf compartment soft and nontender bilaterally.  DP/PT pulses intact 2+.  Cap refill less than 2 seconds. Left foot without edema, discoloration, open wounds or tenderness to palpation.  Calf compartment soft and nontender bilaterally.  DP/PT pulses intact 2+.  Cap refill less than 2 seconds.  Bilateral foot/great toe dorsiflexion/plantar flexion intact 5/5.

## 2023-05-14 NOTE — ED ADULT TRIAGE NOTE - BP NONINVASIVE DIASTOLIC (MM HG)
Discharge Planner OT   Patient plan for discharge: home with ADL A from PCA  Current status: Pt up Ind in w/c, physical therapy following for IP needs  Barriers to return to prior living situation: none  Recommendations for discharge: home with A from PCA   Rationale for recommendations: Defer OT evaluation as pt is at/near ADL baseline.        Entered by: Bashir Willard 10/30/2019 3:15 PM        87

## 2023-05-14 NOTE — ED PROVIDER NOTE - PATIENT PORTAL LINK FT
You can access the FollowMyHealth Patient Portal offered by [facility] by registering at the following website: [email or web address]. By joining [employer]’s FollowMyHealth portal, you will also be able to view your health information using other applications (apps) compatible with our system.

## 2023-05-14 NOTE — ED ADULT NURSE NOTE - NSFALLUNIVINTERV_ED_ALL_ED
Bed/Stretcher in lowest position, wheels locked, appropriate side rails in place/Call bell, personal items and telephone in reach/Instruct patient to call for assistance before getting out of bed/chair/stretcher/Non-slip footwear applied when patient is off stretcher/[place] to call system/Physically safe environment - no spills, clutter or unnecessary equipment/Purposeful proactive rounding/Room/bathroom lighting operational, light cord in reach

## 2023-05-14 NOTE — ED ADULT NURSE REASSESSMENT NOTE - NSFALLUNIVINTERV_ED_ALL_ED
Bed/Stretcher in lowest position, wheels locked, appropriate side rails in place/Call bell, personal items and telephone in reach/Instruct patient to call for assistance before getting out of bed/chair/stretcher/Non-slip footwear applied when patient is off stretcher/[place] to call system/Physically safe environment - no spills, clutter or unnecessary equipment/Purposeful proactive rounding/Room/bathroom lighting operational, light cord in reach

## 2023-05-14 NOTE — ED PROVIDER NOTE - OBJECTIVE STATEMENT
57-year-old female, history of hypertension, diabetes, neuropathy, presents with multiple medical complaints.  Reports for the last 3-4 weeks has been having on and off generalized upper abdominal pain.  No specific alleviating or aggravating factors.  Pain worse on the right upper quadrant.  Still able to eat meals normally without any exacerbation of pain.  No associated bloating, abdominal distention or nausea or vomiting.  Has normal regular bowel movements.  Denies any fever, chills or urinary symptoms associated with pain.  No recent rash.  Patient also reports this morning while sleeping felt her left foot was swollen and heavy.  Since then the symptoms resolved.  Also noticed some burning sensation to the left ear 2 days.  Denies any drainage from the ear any recent injuries, any headache, any vision changes or any other complaints.

## 2023-08-23 ENCOUNTER — EMERGENCY (EMERGENCY)
Facility: HOSPITAL | Age: 58
LOS: 1 days | Discharge: HOME | End: 2023-08-23
Attending: STUDENT IN AN ORGANIZED HEALTH CARE EDUCATION/TRAINING PROGRAM
Payer: COMMERCIAL

## 2023-08-23 VITALS
RESPIRATION RATE: 17 BRPM | SYSTOLIC BLOOD PRESSURE: 148 MMHG | DIASTOLIC BLOOD PRESSURE: 89 MMHG | TEMPERATURE: 99 F | HEIGHT: 61 IN | OXYGEN SATURATION: 99 % | WEIGHT: 132.94 LBS | HEART RATE: 75 BPM

## 2023-08-23 VITALS — HEART RATE: 68 BPM

## 2023-08-23 LAB
ALBUMIN SERPL ELPH-MCNC: 3.5 G/DL — SIGNIFICANT CHANGE UP (ref 3.5–5)
ALP SERPL-CCNC: 97 U/L — SIGNIFICANT CHANGE UP (ref 40–120)
ALT FLD-CCNC: 30 U/L DA — SIGNIFICANT CHANGE UP (ref 10–60)
ANION GAP SERPL CALC-SCNC: 5 MMOL/L — SIGNIFICANT CHANGE UP (ref 5–17)
ANISOCYTOSIS BLD QL: SIGNIFICANT CHANGE UP
APPEARANCE UR: CLEAR — SIGNIFICANT CHANGE UP
APTT BLD: 39.6 SEC — HIGH (ref 24.5–35.6)
AST SERPL-CCNC: 14 U/L — SIGNIFICANT CHANGE UP (ref 10–40)
BACTERIA # UR AUTO: ABNORMAL /HPF
BASOPHILS # BLD AUTO: 0.04 K/UL — SIGNIFICANT CHANGE UP (ref 0–0.2)
BASOPHILS NFR BLD AUTO: 0.5 % — SIGNIFICANT CHANGE UP (ref 0–2)
BILIRUB SERPL-MCNC: 0.6 MG/DL — SIGNIFICANT CHANGE UP (ref 0.2–1.2)
BILIRUB UR-MCNC: NEGATIVE — SIGNIFICANT CHANGE UP
BUN SERPL-MCNC: 41 MG/DL — HIGH (ref 7–18)
CALCIUM SERPL-MCNC: 9 MG/DL — SIGNIFICANT CHANGE UP (ref 8.4–10.5)
CHLORIDE SERPL-SCNC: 107 MMOL/L — SIGNIFICANT CHANGE UP (ref 96–108)
CO2 SERPL-SCNC: 25 MMOL/L — SIGNIFICANT CHANGE UP (ref 22–31)
COLOR SPEC: YELLOW — SIGNIFICANT CHANGE UP
CREAT SERPL-MCNC: 1.23 MG/DL — SIGNIFICANT CHANGE UP (ref 0.5–1.3)
D DIMER BLD IA.RAPID-MCNC: <150 NG/ML DDU — SIGNIFICANT CHANGE UP
DIFF PNL FLD: NEGATIVE — SIGNIFICANT CHANGE UP
EGFR: 51 ML/MIN/1.73M2 — LOW
ELLIPTOCYTES BLD QL SMEAR: SLIGHT — SIGNIFICANT CHANGE UP
EOSINOPHIL # BLD AUTO: 0.11 K/UL — SIGNIFICANT CHANGE UP (ref 0–0.5)
EOSINOPHIL NFR BLD AUTO: 1.3 % — SIGNIFICANT CHANGE UP (ref 0–6)
EPI CELLS # UR: SIGNIFICANT CHANGE UP
GLUCOSE SERPL-MCNC: 95 MG/DL — SIGNIFICANT CHANGE UP (ref 70–99)
GLUCOSE UR QL: >=1000 MG/DL
HCT VFR BLD CALC: 35.3 % — SIGNIFICANT CHANGE UP (ref 34.5–45)
HGB BLD-MCNC: 10.5 G/DL — LOW (ref 11.5–15.5)
HYPOCHROMIA BLD QL: SIGNIFICANT CHANGE UP
IMM GRANULOCYTES NFR BLD AUTO: 0.2 % — SIGNIFICANT CHANGE UP (ref 0–0.9)
INR BLD: 0.96 RATIO — SIGNIFICANT CHANGE UP (ref 0.85–1.18)
KETONES UR-MCNC: NEGATIVE MG/DL — SIGNIFICANT CHANGE UP
LEUKOCYTE ESTERASE UR-ACNC: NEGATIVE — SIGNIFICANT CHANGE UP
LYMPHOCYTES # BLD AUTO: 2.54 K/UL — SIGNIFICANT CHANGE UP (ref 1–3.3)
LYMPHOCYTES # BLD AUTO: 29.7 % — SIGNIFICANT CHANGE UP (ref 13–44)
MANUAL SMEAR VERIFICATION: SIGNIFICANT CHANGE UP
MCHC RBC-ENTMCNC: 17.8 PG — LOW (ref 27–34)
MCHC RBC-ENTMCNC: 29.7 GM/DL — LOW (ref 32–36)
MCV RBC AUTO: 59.9 FL — LOW (ref 80–100)
MICROCYTES BLD QL: SLIGHT — SIGNIFICANT CHANGE UP
MONOCYTES # BLD AUTO: 0.47 K/UL — SIGNIFICANT CHANGE UP (ref 0–0.9)
MONOCYTES NFR BLD AUTO: 5.5 % — SIGNIFICANT CHANGE UP (ref 2–14)
NEUTROPHILS # BLD AUTO: 5.37 K/UL — SIGNIFICANT CHANGE UP (ref 1.8–7.4)
NEUTROPHILS NFR BLD AUTO: 62.8 % — SIGNIFICANT CHANGE UP (ref 43–77)
NITRITE UR-MCNC: NEGATIVE — SIGNIFICANT CHANGE UP
NRBC # BLD: 0 /100 WBCS — SIGNIFICANT CHANGE UP (ref 0–0)
OVALOCYTES BLD QL SMEAR: SLIGHT — SIGNIFICANT CHANGE UP
PH UR: 5 — SIGNIFICANT CHANGE UP (ref 5–8)
PLAT MORPH BLD: NORMAL — SIGNIFICANT CHANGE UP
PLATELET # BLD AUTO: 245 K/UL — SIGNIFICANT CHANGE UP (ref 150–400)
POIKILOCYTOSIS BLD QL AUTO: SIGNIFICANT CHANGE UP
POTASSIUM SERPL-MCNC: 3.9 MMOL/L — SIGNIFICANT CHANGE UP (ref 3.5–5.3)
POTASSIUM SERPL-SCNC: 3.9 MMOL/L — SIGNIFICANT CHANGE UP (ref 3.5–5.3)
PROT SERPL-MCNC: 7.6 G/DL — SIGNIFICANT CHANGE UP (ref 6–8.3)
PROT UR-MCNC: NEGATIVE MG/DL — SIGNIFICANT CHANGE UP
PROTHROM AB SERPL-ACNC: 11 SEC — SIGNIFICANT CHANGE UP (ref 9.5–13)
RBC # BLD: 5.89 M/UL — HIGH (ref 3.8–5.2)
RBC # FLD: 18.4 % — HIGH (ref 10.3–14.5)
RBC BLD AUTO: ABNORMAL
RBC CASTS # UR COMP ASSIST: 0 /HPF — SIGNIFICANT CHANGE UP (ref 0–4)
SCHISTOCYTES BLD QL AUTO: SLIGHT — SIGNIFICANT CHANGE UP
SODIUM SERPL-SCNC: 137 MMOL/L — SIGNIFICANT CHANGE UP (ref 135–145)
SP GR SPEC: 1.01 — SIGNIFICANT CHANGE UP (ref 1–1.03)
TROPONIN I, HIGH SENSITIVITY RESULT: <3 NG/L — SIGNIFICANT CHANGE UP
UROBILINOGEN FLD QL: 0.2 MG/DL — SIGNIFICANT CHANGE UP (ref 0.2–1)
WBC # BLD: 8.55 K/UL — SIGNIFICANT CHANGE UP (ref 3.8–10.5)
WBC # FLD AUTO: 8.55 K/UL — SIGNIFICANT CHANGE UP (ref 3.8–10.5)
WBC UR QL: 0 /HPF — SIGNIFICANT CHANGE UP (ref 0–5)

## 2023-08-23 PROCEDURE — 99285 EMERGENCY DEPT VISIT HI MDM: CPT | Mod: 25

## 2023-08-23 PROCEDURE — 96374 THER/PROPH/DIAG INJ IV PUSH: CPT

## 2023-08-23 PROCEDURE — 71046 X-RAY EXAM CHEST 2 VIEWS: CPT | Mod: 26

## 2023-08-23 PROCEDURE — 85610 PROTHROMBIN TIME: CPT

## 2023-08-23 PROCEDURE — 36415 COLL VENOUS BLD VENIPUNCTURE: CPT

## 2023-08-23 PROCEDURE — 85730 THROMBOPLASTIN TIME PARTIAL: CPT

## 2023-08-23 PROCEDURE — 85025 COMPLETE CBC W/AUTO DIFF WBC: CPT

## 2023-08-23 PROCEDURE — 81001 URINALYSIS AUTO W/SCOPE: CPT

## 2023-08-23 PROCEDURE — 87086 URINE CULTURE/COLONY COUNT: CPT

## 2023-08-23 PROCEDURE — 99285 EMERGENCY DEPT VISIT HI MDM: CPT

## 2023-08-23 PROCEDURE — 84484 ASSAY OF TROPONIN QUANT: CPT

## 2023-08-23 PROCEDURE — 93005 ELECTROCARDIOGRAM TRACING: CPT

## 2023-08-23 PROCEDURE — 85379 FIBRIN DEGRADATION QUANT: CPT

## 2023-08-23 PROCEDURE — 71046 X-RAY EXAM CHEST 2 VIEWS: CPT

## 2023-08-23 PROCEDURE — 96375 TX/PRO/DX INJ NEW DRUG ADDON: CPT

## 2023-08-23 PROCEDURE — 80053 COMPREHEN METABOLIC PANEL: CPT

## 2023-08-23 RX ORDER — LIDOCAINE 4 G/100G
1 CREAM TOPICAL ONCE
Refills: 0 | Status: COMPLETED | OUTPATIENT
Start: 2023-08-23 | End: 2023-08-23

## 2023-08-23 RX ORDER — ACETAMINOPHEN 500 MG
1000 TABLET ORAL ONCE
Refills: 0 | Status: COMPLETED | OUTPATIENT
Start: 2023-08-23 | End: 2023-08-23

## 2023-08-23 RX ORDER — FAMOTIDINE 10 MG/ML
20 INJECTION INTRAVENOUS ONCE
Refills: 0 | Status: COMPLETED | OUTPATIENT
Start: 2023-08-23 | End: 2023-08-23

## 2023-08-23 RX ADMIN — FAMOTIDINE 20 MILLIGRAM(S): 10 INJECTION INTRAVENOUS at 14:36

## 2023-08-23 RX ADMIN — Medication 400 MILLIGRAM(S): at 14:37

## 2023-08-23 RX ADMIN — LIDOCAINE 1 PATCH: 4 CREAM TOPICAL at 14:36

## 2023-08-23 NOTE — ED PROVIDER NOTE - PROGRESS NOTE DETAILS
Camille-DO: pt seen and re-evaluated at bedside.  Pt states their symptoms have improved.  Pt comfortable in NAD.  Labs/imaging non-actionable. Will DC with PMD follow up/return precautions, pt understood and agreeable with plan.

## 2023-08-23 NOTE — ED PROVIDER NOTE - OBJECTIVE STATEMENT
February 5, 2019      Minerva Andrade  [address]     [place] [address]              Dear Minerva,      This is a reminder that you are close to due for follow up with Dr. Cisneros. Please call us as soon as possible at [phone] to make this appointment.     Sincerely,      Amelia Rico MD/SS    
57-year-old female with past medical history hypertension, diabetes, mild intermittent asthma presents with multiple medical complaints.  Patient reports intermittent left-sided chest discomfort over the past 3 to 4 days, states waxing and waning and last for few hours and present.  Patient reports associated epigastric discomfort.  Denies any worsening with exertion, oral intake, movement, or position change.  Patient states she had stress test approximately 4 months ago, normal per patient.  Patient also reports nonradiating bilateral low back pain.  Denies any fevers, headache, vision change, shortness of breath, vomiting, diarrhea, dysuria, bloody stools, black tarry stools, numbness, focal weakness, or rash.  Denies any history of tobacco use.  Denies any additional complaints.

## 2023-08-23 NOTE — ED ADULT NURSE NOTE - CAS EDN DISCHARGE ASSESSMENT
Well-developed, well nourished Well-developed, well nourished Well-developed, well nourished Alert and oriented to person, place and time

## 2023-08-23 NOTE — ED PROVIDER NOTE - PHYSICAL EXAMINATION
CONSTITUTIONAL: non-toxic, well appearing  SKIN: no rash, no petechiae.  EYES: pink conjunctiva, anicteric  ENT: tongue and uvular midline, no exudates, moist mucous membranes  NECK: Supple; no meningismus, no JVD  CARD: RRR, no murmurs, equal radial pulses bilaterally 2+  RESP: CTAB, no respiratory distress  ABD: Soft, non-tender, non-distended, no peritoneal signs, no CVA tenderness  EXT: Normal ROM x4. No edema.   NEURO: Alert, oriented. Neuro exam nonfocal.  PSYCH: Cooperative, appropriate.

## 2023-08-23 NOTE — ED ADULT NURSE NOTE - NSFALLUNIVINTERV_ED_ALL_ED
Bed/Stretcher in lowest position, wheels locked, appropriate side rails in place/Call bell, personal items and telephone in reach/Instruct patient to call for assistance before getting out of bed/chair/stretcher/Non-slip footwear applied when patient is off stretcher/[place] to call system/Physically safe environment - no spills, clutter or unnecessary equipment/Purposeful proactive rounding/Room/bathroom lighting operational, light cord in reach

## 2023-08-23 NOTE — ED ADULT NURSE NOTE - OBJECTIVE STATEMENT
Pt c/o chest pressure/lower back pain on and off x 1 month. No acute distress noted, denies chest pain, no SOB noted. EKG completed. Pt denies trauma/injuries/falls

## 2023-08-23 NOTE — ED PROVIDER NOTE - PATIENT PORTAL LINK FT
Benefits, risks, and possible complications of procedure explained to patient/caregiver who verbalized understanding and gave verbal consent. You can access the FollowMyHealth Patient Portal offered by [facility] by registering at the following website: [email or web address]. By joining [employer]’s FollowMyHealth portal, you will also be able to view your health information using other applications (apps) compatible with our system.

## 2023-08-23 NOTE — ED PROVIDER NOTE - CLINICAL SUMMARY MEDICAL DECISION MAKING FREE TEXT BOX
Sowmya: 57-year-old female with past medical history hypertension, diabetes, mild intermittent asthma presents with multiple medical complaints.  Patient reports intermittent left-sided chest discomfort over the past 3 to 4 days, states waxing and waning and last for few hours and present.  Patient reports associated epigastric discomfort.  Denies any worsening with exertion, oral intake, movement, or position change.  Patient states she had stress test approximately 4 months ago, normal per patient.  Patient also reports nonradiating bilateral low back pain.  Denies any fevers, headache, vision change, shortness of breath, vomiting, diarrhea, dysuria, bloody stools, black tarry stools, numbness, focal weakness, or rash.  Denies any history of tobacco use.  Physical exam per above. No evidence of ACS, pericarditis, myocarditis, pulmonary embolism,  pneumothorax, pneumonia, Zoster, or esophageal perforation.  Historically not abrupt in onset, tearing or ripping, pulses symmetric, no e/o aortic dissection. Will obtain labs, imaging, provide supportive treatment with dispo pending workup.

## 2023-08-24 LAB
CULTURE RESULTS: SIGNIFICANT CHANGE UP
SPECIMEN SOURCE: SIGNIFICANT CHANGE UP

## 2023-09-22 NOTE — PATIENT PROFILE ADULT - FUNCTIONAL ASSESSMENT - BASIC MOBILITY 6.
Spoke to daughter Zhanna. 4 = No assist / stand by assistance

## 2023-11-16 NOTE — H&P ADULT - NSHPREVIEWOFSYSTEMS_GEN_ALL_CORE
CONSTITUTIONAL: No changes in appetite or  weakness.  No fever, weight loss, or fatigue  EYES: Has blurry vision  ENT:  No difficulty hearing, tinnitus, vertigo; No sinus or throat pain  NECK: No pain or stiffness  RESPIRATORY: No cough, wheezing, chills or hemoptysis; No Shortness of Breath  CARDIOVASCULAR: Has chest pain, No palpitations, passing out, dizziness, or leg swelling  GASTROINTESTINAL: No abdominal or epigastric pain. No nausea, vomiting, or hematemesis; No diarrhea or constipation. No melena or hematochezia.  GENITOURINARY: No dysuria, frequency, hematuria, or incontinence  NEUROLOGICAL: Left facial numbness   SKIN: No skin lesions   LYMPH Nodes: No enlarged glands  ENDOCRINE: No heat or cold intolerance; No hair loss  MUSCULOSKELETAL: No joint pain or swelling; No muscle, back, No extremity pain  PSYCHIATRIC: No depression, anxiety, mood swings, or difficulty sleeping  HEME/LYMPH: No easy bruising, or bleeding gums  ALLERGY AND IMMUNOLOGIC: No hives or eczema negative...

## 2024-04-17 NOTE — ED PROVIDER NOTE - CPE EDP NEURO NORM
[place] [facility] Note    # Demographics  Consult Type: Acute Stroke Level 2 (4.5-24 hrs)    Patient Location: Inpatient    First Name: Roslyn    Last Name: Alice    YOB: 1964    Age: 60    Gender: Female    Facility: [facility]    Time of Initial Page (Central Time): 04/17/2024, 13:55    Time of Return Call (Central Time): 04/17/2024, 13:55    # HPI  Chief Complaint:  altered mental state  weakness (focal)    History: 60F with prior stroke and right-sided weakness noted today to be less responsive and new left sided weakness. Last moving her left-sided normally around 0830.    # Scores  Time of exam and NIHSS (Central Time): 04/17/2024, 14:04    Level of Consciousness 1a: [0] = Alert; keenly responsive    LOC Questions 1b: [0] = Answers both questions correctly    LOC Commands 1c: [0] = Performs both tasks correctly    Best Gaze 2: [0] = Normal    Visual 3: [0] = No visual loss    Facial Palsy 4: [0] = Normal symmetrical movements    Motor Arm Left 5a: [1] = Drift    Motor Arm Right 5b: [3] = No effort against gravity    Motor Leg Left 6a: [2] = Some effort against gravity    Motor Leg Right 6b: [3] = No effort against gravity    Limb Ataxia 7: [0] = Absent    Sensory 8: [0] = Normal    Best Language 9: [1] = Mild-to-moderate aphasia    Dysarthria 10: [1] = Mild-to-moderate dysarthria    Extinction and Inattention 11: [0] = No abnormality    NIHSS Total: 11    # Data  Time Head CT personally read by me (Central Time): 04/17/2024, 14:02    Head CT:  no bleed  preliminarily reviewed by me, please refer to radiology read for official reading    # Assessment  Impression:  r/o stroke    # Plan  Thrombolytic/Intervention: Possible IA candidate    Thrombolytic Exclusion: > 4.5 hours    Possible IA Candidate:  CTA pending    Target Blood Pressure:  SBP < 220  DBP < 105    Imaging: (urgency: STAT):  CT Angiogram Head and CT Angiogram Neck AND call back with results if  abnormal    Other:  If patient has any neurological deterioration please call me back immediately  I have discussed my recommendations with the referring provider  Consult on-site neurology for further work-up and management    Disposition: continue admission    # Logistics  Attestation of consult completion: The patient is located at: [facility]. Facility staff participated in the visit. I performed this telemedicine visit from my offsite office utilizing interactive 2 way audio and visual telecommunication technology.    Consent: Verbal consent was obtained from the patient and/or family for this encounter.    Total time spent in telemedicine encounter: I spent 15 minutes reviewing clinical data and/or imaging, obtaining history, examining the patient, communicating with the onsite care team, and in preparation of this report.      Electronically signed at 04/17/2024 16:10 (Central Time) by Sonido Green MD   normal...

## 2024-07-23 NOTE — ED ADULT NURSE NOTE - CAS EDN DISCHARGE INTERVENTIONS
"Chief Complaint   Patient presents with    Echocardiogram Results     7/16/2024     Follow Up     6 month Cardiac Visit        Initial /70 (BP Location: Right arm, Patient Position: Sitting, Cuff Size: Adult Regular)   Pulse 72   Temp 97.3  F (36.3  C) (Temporal)   Resp 16   Ht 1.575 m (5' 2\")   Wt 54.9 kg (121 lb)   SpO2 98%   BMI 22.13 kg/m   Estimated body mass index is 22.13 kg/m  as calculated from the following:    Height as of this encounter: 1.575 m (5' 2\").    Weight as of this encounter: 54.9 kg (121 lb).  Meds Reconciled: complete  Pt is not on Aspirin  Pt is not on a Statin  Pt is not on Xarelto or Eliquis  Pt is not on a Warfarin   PHQ and/or CHAD reviewed. Pt referred to PCP/MH Provider as appropriate.    Enriqueta Kaufman LPN         "
IV discontinued, cath removed intact

## 2025-02-12 ENCOUNTER — INPATIENT (INPATIENT)
Facility: HOSPITAL | Age: 60
LOS: 0 days | Discharge: HOME | DRG: 313 | End: 2025-02-13
Attending: STUDENT IN AN ORGANIZED HEALTH CARE EDUCATION/TRAINING PROGRAM | Admitting: STUDENT IN AN ORGANIZED HEALTH CARE EDUCATION/TRAINING PROGRAM
Payer: COMMERCIAL

## 2025-02-12 VITALS
HEIGHT: 64 IN | RESPIRATION RATE: 18 BRPM | SYSTOLIC BLOOD PRESSURE: 149 MMHG | WEIGHT: 144.84 LBS | TEMPERATURE: 98 F | HEART RATE: 92 BPM | DIASTOLIC BLOOD PRESSURE: 92 MMHG | OXYGEN SATURATION: 96 %

## 2025-02-12 DIAGNOSIS — Z29.9 ENCOUNTER FOR PROPHYLACTIC MEASURES, UNSPECIFIED: ICD-10-CM

## 2025-02-12 DIAGNOSIS — E11.9 TYPE 2 DIABETES MELLITUS WITHOUT COMPLICATIONS: ICD-10-CM

## 2025-02-12 DIAGNOSIS — R07.89 OTHER CHEST PAIN: ICD-10-CM

## 2025-02-12 DIAGNOSIS — E78.5 HYPERLIPIDEMIA, UNSPECIFIED: ICD-10-CM

## 2025-02-12 DIAGNOSIS — I10 ESSENTIAL (PRIMARY) HYPERTENSION: ICD-10-CM

## 2025-02-12 DIAGNOSIS — G62.9 POLYNEUROPATHY, UNSPECIFIED: ICD-10-CM

## 2025-02-12 DIAGNOSIS — R07.9 CHEST PAIN, UNSPECIFIED: ICD-10-CM

## 2025-02-12 LAB
ALBUMIN SERPL ELPH-MCNC: 3.5 G/DL — SIGNIFICANT CHANGE UP (ref 3.5–5)
ALP SERPL-CCNC: 111 U/L — SIGNIFICANT CHANGE UP (ref 40–120)
ALT FLD-CCNC: 21 U/L DA — SIGNIFICANT CHANGE UP (ref 10–60)
ANION GAP SERPL CALC-SCNC: 5 MMOL/L — SIGNIFICANT CHANGE UP (ref 5–17)
ANISOCYTOSIS BLD QL: SIGNIFICANT CHANGE UP
AST SERPL-CCNC: 15 U/L — SIGNIFICANT CHANGE UP (ref 10–40)
BASOPHILS # BLD AUTO: 0.02 K/UL — SIGNIFICANT CHANGE UP (ref 0–0.2)
BASOPHILS NFR BLD AUTO: 0.2 % — SIGNIFICANT CHANGE UP (ref 0–2)
BILIRUB SERPL-MCNC: 0.9 MG/DL — SIGNIFICANT CHANGE UP (ref 0.2–1.2)
BUN SERPL-MCNC: 29 MG/DL — HIGH (ref 7–18)
CALCIUM SERPL-MCNC: 9.8 MG/DL — SIGNIFICANT CHANGE UP (ref 8.4–10.5)
CHLORIDE SERPL-SCNC: 105 MMOL/L — SIGNIFICANT CHANGE UP (ref 96–108)
CO2 SERPL-SCNC: 25 MMOL/L — SIGNIFICANT CHANGE UP (ref 22–31)
CREAT SERPL-MCNC: 1.42 MG/DL — HIGH (ref 0.5–1.3)
D DIMER BLD IA.RAPID-MCNC: <150 NG/ML DDU — SIGNIFICANT CHANGE UP
DACRYOCYTES BLD QL SMEAR: SLIGHT — SIGNIFICANT CHANGE UP
EGFR: 43 ML/MIN/1.73M2 — LOW
ELLIPTOCYTES BLD QL SMEAR: SIGNIFICANT CHANGE UP
EOSINOPHIL # BLD AUTO: 0.1 K/UL — SIGNIFICANT CHANGE UP (ref 0–0.5)
EOSINOPHIL NFR BLD AUTO: 1.2 % — SIGNIFICANT CHANGE UP (ref 0–6)
FLUAV AG NPH QL: SIGNIFICANT CHANGE UP
FLUBV AG NPH QL: SIGNIFICANT CHANGE UP
GLUCOSE BLDC GLUCOMTR-MCNC: 170 MG/DL — HIGH (ref 70–99)
GLUCOSE BLDC GLUCOMTR-MCNC: 180 MG/DL — HIGH (ref 70–99)
GLUCOSE SERPL-MCNC: 260 MG/DL — HIGH (ref 70–99)
HCT VFR BLD CALC: 37.9 % — SIGNIFICANT CHANGE UP (ref 34.5–45)
HGB BLD-MCNC: 11.6 G/DL — SIGNIFICANT CHANGE UP (ref 11.5–15.5)
HYPOCHROMIA BLD QL: SLIGHT — SIGNIFICANT CHANGE UP
IMM GRANULOCYTES NFR BLD AUTO: 0.1 % — SIGNIFICANT CHANGE UP (ref 0–0.9)
LIDOCAIN IGE QN: 33 U/L — SIGNIFICANT CHANGE UP (ref 13–75)
LYMPHOCYTES # BLD AUTO: 1.88 K/UL — SIGNIFICANT CHANGE UP (ref 1–3.3)
LYMPHOCYTES # BLD AUTO: 23 % — SIGNIFICANT CHANGE UP (ref 13–44)
MANUAL SMEAR VERIFICATION: SIGNIFICANT CHANGE UP
MCHC RBC-ENTMCNC: 18.3 PG — LOW (ref 27–34)
MCHC RBC-ENTMCNC: 30.6 G/DL — LOW (ref 32–36)
MCV RBC AUTO: 59.9 FL — LOW (ref 80–100)
MICROCYTES BLD QL: SIGNIFICANT CHANGE UP
MONOCYTES # BLD AUTO: 0.44 K/UL — SIGNIFICANT CHANGE UP (ref 0–0.9)
MONOCYTES NFR BLD AUTO: 5.4 % — SIGNIFICANT CHANGE UP (ref 2–14)
NEUTROPHILS # BLD AUTO: 5.71 K/UL — SIGNIFICANT CHANGE UP (ref 1.8–7.4)
NEUTROPHILS NFR BLD AUTO: 70.1 % — SIGNIFICANT CHANGE UP (ref 43–77)
NRBC BLD AUTO-RTO: 0 /100 WBCS — SIGNIFICANT CHANGE UP (ref 0–0)
NT-PROBNP SERPL-SCNC: 31 PG/ML — SIGNIFICANT CHANGE UP (ref 0–125)
OVALOCYTES BLD QL SMEAR: SIGNIFICANT CHANGE UP
PLAT MORPH BLD: NORMAL — SIGNIFICANT CHANGE UP
PLATELET # BLD AUTO: 271 K/UL — SIGNIFICANT CHANGE UP (ref 150–400)
PLATELET COUNT - ESTIMATE: NORMAL — SIGNIFICANT CHANGE UP
POIKILOCYTOSIS BLD QL AUTO: SIGNIFICANT CHANGE UP
POLYCHROMASIA BLD QL SMEAR: SLIGHT — SIGNIFICANT CHANGE UP
POTASSIUM SERPL-MCNC: 3.8 MMOL/L — SIGNIFICANT CHANGE UP (ref 3.5–5.3)
POTASSIUM SERPL-SCNC: 3.8 MMOL/L — SIGNIFICANT CHANGE UP (ref 3.5–5.3)
PROT SERPL-MCNC: 7.8 G/DL — SIGNIFICANT CHANGE UP (ref 6–8.3)
RBC # BLD: 6.33 M/UL — HIGH (ref 3.8–5.2)
RBC # FLD: 18.6 % — HIGH (ref 10.3–14.5)
RBC BLD AUTO: ABNORMAL
RSV RNA NPH QL NAA+NON-PROBE: SIGNIFICANT CHANGE UP
SARS-COV-2 RNA SPEC QL NAA+PROBE: SIGNIFICANT CHANGE UP
SCHISTOCYTES BLD QL AUTO: SLIGHT — SIGNIFICANT CHANGE UP
SODIUM SERPL-SCNC: 135 MMOL/L — SIGNIFICANT CHANGE UP (ref 135–145)
TARGETS BLD QL SMEAR: SLIGHT — SIGNIFICANT CHANGE UP
TROPONIN I, HIGH SENSITIVITY RESULT: <3 NG/L — SIGNIFICANT CHANGE UP
TROPONIN I, HIGH SENSITIVITY RESULT: <3 NG/L — SIGNIFICANT CHANGE UP
WBC # BLD: 8.16 K/UL — SIGNIFICANT CHANGE UP (ref 3.8–10.5)
WBC # FLD AUTO: 8.16 K/UL — SIGNIFICANT CHANGE UP (ref 3.8–10.5)

## 2025-02-12 PROCEDURE — 99285 EMERGENCY DEPT VISIT HI MDM: CPT

## 2025-02-12 PROCEDURE — 99223 1ST HOSP IP/OBS HIGH 75: CPT

## 2025-02-12 PROCEDURE — 71046 X-RAY EXAM CHEST 2 VIEWS: CPT | Mod: 26

## 2025-02-12 RX ORDER — HYDROCHLOROTHIAZIDE 50 MG
1 TABLET ORAL
Refills: 0 | DISCHARGE

## 2025-02-12 RX ORDER — ASPIRIN 81 MG/1
324 TABLET, COATED ORAL ONCE
Refills: 0 | Status: COMPLETED | OUTPATIENT
Start: 2025-02-12 | End: 2025-02-12

## 2025-02-12 RX ORDER — INSULIN GLARGINE-YFGN 100 [IU]/ML
22 INJECTION, SOLUTION SUBCUTANEOUS AT BEDTIME
Refills: 0 | Status: DISCONTINUED | OUTPATIENT
Start: 2025-02-12 | End: 2025-02-13

## 2025-02-12 RX ORDER — LIDOCAINE HYDROCHLORIDE 30 MG/G
1 CREAM TOPICAL DAILY
Refills: 0 | Status: DISCONTINUED | OUTPATIENT
Start: 2025-02-12 | End: 2025-02-13

## 2025-02-12 RX ORDER — ONDANSETRON 4 MG/1
4 TABLET, ORALLY DISINTEGRATING ORAL ONCE
Refills: 0 | Status: COMPLETED | OUTPATIENT
Start: 2025-02-12 | End: 2025-02-12

## 2025-02-12 RX ORDER — INSULIN LISPRO 100/ML
VIAL (ML) SUBCUTANEOUS
Refills: 0 | Status: DISCONTINUED | OUTPATIENT
Start: 2025-02-12 | End: 2025-02-13

## 2025-02-12 RX ORDER — CARVEDILOL 6.25 MG
1 TABLET ORAL
Refills: 0 | DISCHARGE

## 2025-02-12 RX ORDER — GLUCAGON 3 MG/1
1 POWDER NASAL ONCE
Refills: 0 | Status: DISCONTINUED | OUTPATIENT
Start: 2025-02-12 | End: 2025-02-13

## 2025-02-12 RX ORDER — LOSARTAN POTASSIUM 100 MG
1 TABLET ORAL
Refills: 0 | DISCHARGE

## 2025-02-12 RX ORDER — HEPARIN SODIUM,PORCINE 10000/ML
5000 VIAL (ML) INJECTION EVERY 8 HOURS
Refills: 0 | Status: DISCONTINUED | OUTPATIENT
Start: 2025-02-12 | End: 2025-02-13

## 2025-02-12 RX ORDER — ATORVASTATIN CALCIUM 80 MG/1
20 TABLET, FILM COATED ORAL AT BEDTIME
Refills: 0 | Status: DISCONTINUED | OUTPATIENT
Start: 2025-02-12 | End: 2025-02-13

## 2025-02-12 RX ORDER — ACETAMINOPHEN, DIPHENHYDRAMINE HCL, PHENYLEPHRINE HCL 325; 25; 5 MG/1; MG/1; MG/1
3 TABLET ORAL AT BEDTIME
Refills: 0 | Status: DISCONTINUED | OUTPATIENT
Start: 2025-02-12 | End: 2025-02-13

## 2025-02-12 RX ORDER — DM/PSEUDOEPHED/ACETAMINOPHEN 10-30-250
25 CAPSULE ORAL ONCE
Refills: 0 | Status: DISCONTINUED | OUTPATIENT
Start: 2025-02-12 | End: 2025-02-13

## 2025-02-12 RX ORDER — PHENOL 1.4 %
1 AEROSOL, SPRAY (ML) MUCOUS MEMBRANE THREE TIMES A DAY
Refills: 0 | Status: DISCONTINUED | OUTPATIENT
Start: 2025-02-12 | End: 2025-02-13

## 2025-02-12 RX ORDER — ACETAMINOPHEN 160 MG/5ML
650 SUSPENSION ORAL EVERY 6 HOURS
Refills: 0 | Status: DISCONTINUED | OUTPATIENT
Start: 2025-02-12 | End: 2025-02-13

## 2025-02-12 RX ORDER — TIRZEPATIDE 12.5 MG/.5ML
10 INJECTION, SOLUTION SUBCUTANEOUS
Refills: 0 | DISCHARGE

## 2025-02-12 RX ORDER — LOSARTAN POTASSIUM 100 MG
100 TABLET ORAL DAILY
Refills: 0 | Status: DISCONTINUED | OUTPATIENT
Start: 2025-02-12 | End: 2025-02-13

## 2025-02-12 RX ORDER — ONDANSETRON 4 MG/1
4 TABLET, ORALLY DISINTEGRATING ORAL EVERY 8 HOURS
Refills: 0 | Status: DISCONTINUED | OUTPATIENT
Start: 2025-02-12 | End: 2025-02-13

## 2025-02-12 RX ORDER — SODIUM CHLORIDE 9 G/ML
1000 INJECTION, SOLUTION INTRAVENOUS
Refills: 0 | Status: DISCONTINUED | OUTPATIENT
Start: 2025-02-12 | End: 2025-02-13

## 2025-02-12 RX ORDER — MAGNESIUM, ALUMINUM HYDROXIDE 200-225/5
30 SUSPENSION, ORAL (FINAL DOSE FORM) ORAL EVERY 4 HOURS
Refills: 0 | Status: DISCONTINUED | OUTPATIENT
Start: 2025-02-12 | End: 2025-02-13

## 2025-02-12 RX ORDER — CARVEDILOL 6.25 MG
3.12 TABLET ORAL EVERY 12 HOURS
Refills: 0 | Status: DISCONTINUED | OUTPATIENT
Start: 2025-02-12 | End: 2025-02-13

## 2025-02-12 RX ORDER — INSULIN LISPRO 100/ML
VIAL (ML) SUBCUTANEOUS AT BEDTIME
Refills: 0 | Status: DISCONTINUED | OUTPATIENT
Start: 2025-02-12 | End: 2025-02-13

## 2025-02-12 RX ORDER — DM/PSEUDOEPHED/ACETAMINOPHEN 10-30-250
15 CAPSULE ORAL ONCE
Refills: 0 | Status: DISCONTINUED | OUTPATIENT
Start: 2025-02-12 | End: 2025-02-13

## 2025-02-12 RX ADMIN — ATORVASTATIN CALCIUM 20 MILLIGRAM(S): 80 TABLET, FILM COATED ORAL at 22:13

## 2025-02-12 RX ADMIN — Medication 1: at 18:09

## 2025-02-12 RX ADMIN — Medication 3.12 MILLIGRAM(S): at 18:13

## 2025-02-12 NOTE — H&P ADULT - NSICDXFAMILYHX_GEN_ALL_CORE_FT
FAMILY HISTORY:  Family history of diabetes mellitus (DM)  FH: HTN (hypertension)    Father  Still living? Unknown  FH: sudden death, Age at diagnosis: Age Unknown

## 2025-02-12 NOTE — H&P ADULT - PROBLEM SELECTOR PLAN 2
BUN / Cr 29/1.42 on admission (per OP nephrology records, Nov 2024 BUN/Cr 31/1.4)    - Keep patient euvolemic on Renal diet   - Avoid Nephrotoxic Meds/ Agents such as NSAIDs, IV contrast, Aminoglycosides such as gentamicin, Gadolinium contrast, Phosphate containing enemas among others  - Adjust Medications according to eGFR  - Monitor BMP daily, replace electrolytes as needed.

## 2025-02-12 NOTE — H&P ADULT - PROBLEM SELECTOR PLAN 1
P/w 10 days of left-sided chest pressure  EKG showing nsr with low-voltage QRS complexes  trop neg x2  HEART score 3 points  s/p 2 asa at home    -telemetry  -TTE  -f/u lipids  -f/u A1c  -cardiology consult P/w 10 days of left-sided chest pressure  EKG showing nsr with low-voltage QRS complexes  trop neg x2  HEART score 3 points  s/p 2 asa at home    -telemetry  -TTE  -f/u lipids  -f/u A1c  -cardiology consultws - Dr. Norris P/w 10 days of left-sided chest pressure  EKG showing nsr with low-voltage QRS complexes  trop neg x2  HEART score 3 points  s/p 2 asa at home    -telemetry  -TTE  -f/u lipids  -f/u A1c  -cardiology consultws - Dr. Norris  stress test in AM

## 2025-02-12 NOTE — ED ADULT NURSE NOTE - NSFALLUNIVINTERV_ED_ALL_ED
Bed/Stretcher in lowest position, wheels locked, appropriate side rails in place/Call bell, personal items and telephone in reach/Instruct patient to call for assistance before getting out of bed/chair/stretcher/Non-slip footwear applied when patient is off stretcher/[place] to call system/Physically safe environment - no spills, clutter or unnecessary equipment/Purposeful proactive rounding/Room/bathroom lighting operational, light cord in reach

## 2025-02-12 NOTE — ED ADULT NURSE REASSESSMENT NOTE - GENERAL PATIENT STATE
resting/sleeping
comfortable appearance/cooperative
comfortable appearance/cooperative/resting/sleeping/smiling/interactive

## 2025-02-12 NOTE — ED ADULT NURSE REASSESSMENT NOTE - CONDITION
Normal vision: sees adequately in most situations; can see medication labels, newsprint
improved

## 2025-02-12 NOTE — H&P ADULT - NSHPREVIEWOFSYSTEMS_GEN_ALL_CORE
CONSTITUTIONAL:  No fevers or chills, good appetite  EYES/ENT:  No visual changes;  No vertigo or throat pain   NECK:  No neck pain or stiffness  RESPIRATORY:  No cough, wheezing, hemoptysis; No shortness of breath  CARDIOVASCULAR:  No chest pain or palpitations  GASTROINTESTINAL:  No abdominal pain. No nausea, vomiting, or hematemesis; No diarrhea or constipation. No melena or hematochezia.  GENITOURINARY:  No dysuria, frequency or hematuria  NEUROLOGICAL:  No HA, no numbness or LE weakness. +anxiety  MSK: no back pain, no joint pain  SKIN:  No itching, no skin rash

## 2025-02-12 NOTE — H&P ADULT - NSHPPHYSICALEXAM_GEN_ALL_CORE
Vital Signs Last 24 Hrs  T(C): 36.7 (12 Feb 2025 11:00), Max: 36.7 (12 Feb 2025 05:30)  T(F): 98.1 (12 Feb 2025 11:00), Max: 98.1 (12 Feb 2025 11:00)  HR: 70 (12 Feb 2025 11:00) (70 - 92)  BP: 134/78 (12 Feb 2025 11:00) (134/78 - 149/92)  BP(mean): --  RR: 19 (12 Feb 2025 11:00) (16 - 19)  SpO2: 100% (12 Feb 2025 11:00) (96% - 100%)    Parameters below as of 12 Feb 2025 11:00  Patient On (Oxygen Delivery Method): room air        PHYSICAL EXAM:  GENERAL: NAD, speaks in full sentences, no signs of respiratory distress. +anxious affect  HEAD:  Atraumatic, Normocephalic  EYES: EOMI, PERRLA, conjunctiva and sclera clear  NECK: Supple, No JVD  CHEST/LUNG: Clear to auscultation bilaterally; No wheeze; No crackles; No accessory muscles used. +Palpable pain over upper left costochondral junction  HEART: Regular rate and rhythm; No murmurs;   ABDOMEN: Soft, Nontender, Nondistended; Bowel sounds present; No guarding  EXTREMITIES:  2+ Peripheral Pulses, No cyanosis or edema  : voiding well  NEUROLOGY: AAOx3, no focal deficits  SKIN: No rashes or lesions

## 2025-02-12 NOTE — ED ADULT NURSE REASSESSMENT NOTE - COMFORT CARE
darkened lights
darkened lights/po fluids offered/side rails up
darkened lights/meal provided/po fluids offered

## 2025-02-12 NOTE — ED PROVIDER NOTE - OBJECTIVE STATEMENT
59-year-old female, history of hypertension, insulin-dependent diabetes, asthma, presenting with chief complaint of left-sided chest pressure and tightness since waking up at 4:30 AM this morning.   Took 2 tablets of aspirin this morning. Patient endorsing 1 week  of pinching on the left side of the chest.  She  endorses some shortness of breath, however denies any pleuritic component  or exertional chest pain.  She denies any leg pain or leg swelling, any recent long travel or surgeries, hemoptysis, OCP use, vomiting, fever or chills. Does endorse some nausea.  patient denies any allergic reaction to IV contrast, however states that it has caused her to develop neuropathy  in the past?

## 2025-02-12 NOTE — CONSULT NOTE ADULT - ASSESSMENT
Assessment and Recommendations:  59 year old F with HTN, IDDM, ?asthma who presents with L sided chest pressure that acutely worsened this morning.    1) L sided CP, atypical in nature, not related to exertion  2) HTN  3) IDDM  - EKG showed sinus rhythm without ischemic changes  - Continue home ASA 81 and atorvastatin 20mg daily for now  - Check TTE  - Given her risk factors, check Nuclear stress test to r/o ischemia. She states she cannot walk on treadmill due to neuropathy  - Check a1c, TSH, lipids for risk stratification  - Of note, pt states she had contrast allergy in the past where she had rash and her ?face was swollen    Destin Norris MD ([employer] TEAMS Message PREFERRED)  Cardiology Attending  [facility] - Cardiology    All Cardiology service information can be found 24/7 on [email or web address], password: [email or web address]

## 2025-02-12 NOTE — H&P ADULT - PROBLEM SELECTOR PLAN 6
Hx of chronic intermittent LUE numbness Hx of chronic intermittent LUE numbness with numbness of BL palms   Has been worked up as PO in the past     c/w monitor for now

## 2025-02-12 NOTE — H&P ADULT - ASSESSMENT
59-year-old female, from home, ambulates independently, history of hypertension, insulin-dependent diabetes, asthma, chronic intermittent left upper arm numbness presenting with chief complaint of left-sided chest pressure and tightness x10 days.  EKG nsr with low-voltage QRS complexes. Trops neg x2. Admitted to telemetry for atypical chest pain. 59-year-old female, from home, ambulates independently, history of hypertension, insulin-dependent diabetes, asthma, chronic intermittent left upper arm numbness presenting with chief complaint of left-sided chest pressure and tightness x10 days.  EKG nsr with low-voltage QRS complexes. Trops neg x2. Admitted to telemetry for atypical chest pain workup, pending flu panel.  59-year-old female, from home, ambulates independently, history of hypertension, insulin-dependent diabetes, asthma, chronic intermittent left upper arm numbness presenting with chief complaint of left-sided chest pressure and tightness x10 days.  EKG nsr with low-voltage QRS complexes. Trops neg x2. Admitted to telemetry for atypical chest pain workup, scheduled for stress test in AM

## 2025-02-12 NOTE — ED PROVIDER NOTE - CARE PLAN
Principal Discharge DX:	Chest pain   1 Principal Discharge DX:	Chest pain  Secondary Diagnosis:	ACS (acute coronary syndrome)  Secondary Diagnosis:	DM (diabetes mellitus)  Secondary Diagnosis:	HTN (hypertension)

## 2025-02-12 NOTE — H&P ADULT - ATTENDING COMMENTS
59F PMH HTN, IDDM, asthma, p/w CP, admission for ACS work up.    AP:  CP  ACS work up  HTN  IDDM  Asthma    -CP is reproducible with palpation so doubt sx are cardiac in nature    -will try lidocaine patch  -ekg reviewed without abnormal bryan/std/twi  -trop neg x2  -discussed with cards: will obtain stress test  -obtain tte  -c/w home meds  -dvt ppx

## 2025-02-12 NOTE — ED PROVIDER NOTE - CHILD ABUSE FACILITY
From: Mark Burr  To: Valentín Apple  Sent: 8/24/2021 9:09 AM CDT  Subject: Upcoming Appointment    Please confirm my upcoming lab appointment in [place], by Dr Wang and wellness appt with Dr Apple. I know they are coming up in Sept.  Thank you.  
H

## 2025-02-12 NOTE — H&P ADULT - PROBLEM SELECTOR PLAN 3
h/o HTN on hctz, [id number], coreg 3.125 bid  Monitor BP  c/w home meds -  Lower MAP 20-25% in next 2-4 hrs h/o HTN on hctz, [id number], coreg 3.125 bid  Monitor BP  c/w home meds w/ holding parameters   DASH diet

## 2025-02-12 NOTE — CONSULT NOTE ADULT - SUBJECTIVE AND OBJECTIVE BOX
Patient seen and evaluated at bedside    Chief Complaint: chest pain    HPI:  59 year old F with HTN, IDDM, ?asthma who presents with L sided chest pressure that acutely worsened this morning.    Pt reports intermittent pinched feeling in the L chest that occur throughout the week. She thought she can wait until she saw her cardiologist Dr. Cash this weekend. However, on the day of admission, she woke up at around 430AM due to alarm of glucose monitor having low glucose. She got up and started to feel L sided chest pressure/pain that went down her L arm as well as radiating up to her face. She denies SOB, palpitation, dizziness, or LOC. No orthopnea, PND, or LE edema. Of note, she had a normal nuclear stress test at [facility] 2 years ago. She was last seen by Dr. Cash 8/2024 but she states no work up was done because she felt normal.    PMHx:   No pertinent past medical history    H/O: hypertension    H/O diabetes mellitus    Asthma        PSHx:   No significant past surgical history        Allergies:  contrast media (iodine-based) (Other)      Home Meds:    Current Medications:   acetaminophen     Tablet .. 650 milliGRAM(s) Oral every 6 hours PRN  aluminum hydroxide/magnesium hydroxide/simethicone Suspension 30 milliLiter(s) Oral every 4 hours PRN  atorvastatin 20 milliGRAM(s) Oral at bedtime  benzocaine/menthol Lozenge 1 Lozenge Oral three times a day PRN  carvedilol 3.125 milliGRAM(s) Oral every 12 hours  dextrose 5%. 1000 milliLiter(s) IV Continuous <Continuous>  dextrose 50% Injectable 25 Gram(s) IV Push once  dextrose Oral Gel 15 Gram(s) Oral once PRN  glucagon  Injectable 1 milliGRAM(s) IntraMuscular once  heparin   Injectable 5000 Unit(s) SubCutaneous every 8 hours  hydrochlorothiazide 12.5 milliGRAM(s) Oral daily  insulin glargine Injectable (LANTUS) 22 Unit(s) SubCutaneous at bedtime  insulin lispro (ADMELOG) corrective regimen sliding scale   SubCutaneous three times a day before meals  insulin lispro (ADMELOG) corrective regimen sliding scale   SubCutaneous at bedtime  losartan 100 milliGRAM(s) Oral daily  melatonin 3 milliGRAM(s) Oral at bedtime PRN  ondansetron Injectable 4 milliGRAM(s) IV Push every 8 hours PRN      FAMILY HISTORY:  Family history of diabetes mellitus (DM)    FH: HTN (hypertension)    FH: sudden death (Father)      Social History:  Smoking History:  Alcohol Use:  Drug Use:    REVIEW OF SYSTEMS:  CONSTITUTIONAL: No weakness, fevers or chills  EYES/ENT: No visual changes;  No dysphagia  NECK: No pain or stiffness  RESPIRATORY: No cough, wheezing, hemoptysis; SOB  CARDIOVASCULAR: +CP; No lower extremity edema  GASTROINTESTINAL: No abdominal or epigastric pain. No nausea, vomiting, or hematemesis; No diarrhea or constipation. No melena or hematochezia.  BACK: No back pain  GENITOURINARY: No dysuria, frequency or hematuria  NEUROLOGICAL: No numbness or weakness  SKIN: No itching, burning, rashes, or lesions   All other review of systems is negative unless indicated above.    Physical Exam:  T(F): 98.1 (02-12), Max: 98.1 (02-12)  HR: 91 (02-12) (70 - 92)  BP: 134/78 (02-12) (134/78 - 149/92)  RR: 19 (02-12)  SpO2: 100% (02-12)  GENERAL: No acute distress, well-developed  HEAD:  Atraumatic, Normocephalic  ENT: EOMI, PERRLA, conjunctiva and sclera clear, Neck supple, No JVD, moist mucosa  CHEST/LUNG: Clear to auscultation bilaterally; No wheeze, equal breath sounds bilaterally   BACK: No spinal tenderness  HEART: Regular rate and rhythm; No murmurs, rubs, or gallops  ABDOMEN: Soft, Nontender, Nondistended; Bowel sounds present  EXTREMITIES:  No clubbing, cyanosis, or edema  PSYCH: Nl behavior, nl affect  NEUROLOGY: AAOx3, non-focal, cranial nerves intact  SKIN: Normal color, No rashes or lesions  LINES:    Labs:  reviewed                        11.6   8.16  )-----------( 271      ( 12 Feb 2025 06:26 )             37.9     02-12    135  |  105  |  29[H]  ----------------------------<  260[H]  3.8   |  25  |  1.42[H]    Ca    9.8      12 Feb 2025 06:26    TPro  7.8  /  Alb  3.5  /  TBili  0.9  /  DBili  x   /  AST  15  /  ALT  21  /  AlkPhos  111  02-12    Cardiovascular Diagnostic Testing:    ECG: sinus rhythm without significant abnormalities    Echo: pending    Imaging:    CXR:  reviewed

## 2025-02-12 NOTE — ED PROVIDER NOTE - CLINICAL SUMMARY MEDICAL DECISION MAKING FREE TEXT BOX
59-year-old female, history of hypertension, insulin-dependent diabetes, asthma, presenting with chief complaint of left-sided chest pressure and tightness since waking up at 4:30 AM this morning.  VSS, afebrile. EKG NSR HR 70,  QRS 84, QTc 436. CTAB.   Status post 160 mg of aspirin   At home.  Will obtain cardiac workup, placed on cardiac monitor.  Screening D-dimer for PE, will admit for VQ scan if d-dimer elevated given that patient has had adverse reaction to IV contrast ? 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

## 2025-02-12 NOTE — ED ADULT NURSE NOTE - OBJECTIVE STATEMENT
pt complaining of left sided chest pain/pressure and SOB since 430 am. pt states she felt chest discomfort for past week

## 2025-02-12 NOTE — H&P ADULT - HISTORY OF PRESENT ILLNESS
59-year-old female, from home, ambulates independently, history of hypertension, insulin-dependent diabetes, asthma, chronic intermittent left upper arm numbness presenting with chief complaint of left-sided chest pressure and tightness x10 days.   Took 2 tablets of aspirin this morning. Endorsed similar symptoms 2 years ago and came to [facility] for evaluation. Patient endorsing 1 week  of pinching on the left side of the chest.  States she has been having nasal congestion, cough productive of very scant sputum, a/w intermittent shortness of breath for the past 2 weeks.  She denies any leg pain or leg swelling, any recent long travel or surgeries, hemoptysis, OCP use, vomiting, fever or chills. Does endorse some nausea. Denies recent sick contacts, fevers, chills.     In the ER   VS - /78, RR 19, HR 70, afebrile, sats 100% RA     labs s/o abnormal red cell morphology   trop neg x 2   BUN / Cr 29/1.42 (per OP nephrology records, Nov 2024 31/1.4)  Glu 260  CXR - unremarkable   On chart review-   TTE from 4/23 w/ EF 55-60%, G1DD   stress test 4/23  * The left ventricle was normal in size. Normal myocardial  perfusion scan, with no evidence of infarction or  inducible ischemia.  * Post-stress resting myocardial perfusion gated SPECT  imaging was performed (LVEF > 70%;LVEDV = 38 ml.)    Patient states pain has resolved and feels better than when she came in. 59-year-old female, from home, ambulates independently, history of hypertension, insulin-dependent diabetes, asthma, chronic intermittent left upper arm numbness presenting with chief complaint of left-sided chest pressure and tightness x10 days.   Took 2 tablets of aspirin this morning. Endorsed similar symptoms 2 years ago and came to [facility] for evaluation. Patient endorsing 1 week  of pinching on the left side of the chest.  States she has been having nasal congestion, cough productive of very scant sputum, a/w intermittent shortness of breath for the past 2 weeks.  She denies any leg pain or leg swelling, any recent long travel or surgeries, hemoptysis, OCP use, vomiting, fever or chills. Does endorse some nausea. Denies recent sick contacts, fevers, chills.     In the ER   VS - /78, RR 19, HR 70, afebrile, sats 100% RA     labs s/o abnormal red cell morphology   trop neg x 2   BUN / Cr 29/1.42 (per OP nephrology records, Nov 2024 31/1.4)  Glu 260  CXR - unremarkable   On chart review-   TTE from 4/23 w/ EF 55-60%, G1DD   stress test 4/23  * The left ventricle was normal in size. Normal myocardial perfusion scan, with no evidence of infarction or inducible ischemia.  * Post-stress resting myocardial perfusion gated SPECT imaging was performed (LVEF > 70%;LVEDV = 38 ml.)    Patient states pain has resolved and feels better than when she came in.

## 2025-02-12 NOTE — ED ADULT TRIAGE NOTE - CHIEF COMPLAINT QUOTE
Pt c/o left side chest pressure and numbness to left arm and  left shoulder. pt stated she took 2 tabs 81mg aspirin at 4:30 this morning. Pt has history of hypertension.

## 2025-02-12 NOTE — H&P ADULT - PROBLEM SELECTOR PLAN 4
h/o DM on lantus 38 units, SSI, farxiga and mounjaro (last injection Monday)  will  hold oral dm meds while inpatient   f/u A1c  c/w lantus 20u + sliding scale  Adjust insulin as indicated  FS ACHS h/o DM on lantus 38 units, SSI, farxiga and mounjaro (last injection Monday)  A1c 8.6 in Nov    will  hold oral dm meds while inpatient   f/u A1c  c/w lantus 20u + sliding scale  Adjust insulin as indicated  FS ACHS h/o DM on lantus 38 units, SSI, farxiga and mounjaro (last injection Monday)  A1c 8.6 in Nov    will  hold oral dm meds while inpatient   c/w lantus 20u + sliding scale  Adjust insulin as indicated  FS ACHS  DM diet   f/u A1c AM

## 2025-02-12 NOTE — H&P ADULT - PROBLEM SELECTOR PLAN 5
Pt takes atorvastatin 20 mg at home  - c/w home med  - f/u lipid profile  - Dash Diet Pt takes atorvastatin 20 mg at home  Lipid panel 11/2024: chol 166, tg 144, hdl 72, ldl 65    - c/w home med  - f/u lipid profile  - Dash Diet Pt takes atorvastatin 20 mg at home  Lipid panel 11/2024: chol 166, tg 144, hdl 72, ldl 65    - c/w home med  - f/u lipid profile AM   - Dash Diet

## 2025-02-13 VITALS
OXYGEN SATURATION: 100 % | TEMPERATURE: 98 F | DIASTOLIC BLOOD PRESSURE: 71 MMHG | SYSTOLIC BLOOD PRESSURE: 111 MMHG | RESPIRATION RATE: 16 BRPM | HEART RATE: 81 BPM

## 2025-02-13 LAB
A1C WITH ESTIMATED AVERAGE GLUCOSE RESULT: 8.9 % — HIGH (ref 4–5.6)
ANION GAP SERPL CALC-SCNC: 8 MMOL/L — SIGNIFICANT CHANGE UP (ref 5–17)
BUN SERPL-MCNC: 36 MG/DL — HIGH (ref 7–18)
CALCIUM SERPL-MCNC: 9.4 MG/DL — SIGNIFICANT CHANGE UP (ref 8.4–10.5)
CHLORIDE SERPL-SCNC: 108 MMOL/L — SIGNIFICANT CHANGE UP (ref 96–108)
CHOLEST SERPL-MCNC: 123 MG/DL — SIGNIFICANT CHANGE UP
CO2 SERPL-SCNC: 25 MMOL/L — SIGNIFICANT CHANGE UP (ref 22–31)
CREAT SERPL-MCNC: 1.45 MG/DL — HIGH (ref 0.5–1.3)
EGFR: 42 ML/MIN/1.73M2 — LOW
ESTIMATED AVERAGE GLUCOSE: 209 MG/DL — HIGH (ref 68–114)
GLUCOSE BLDC GLUCOMTR-MCNC: 170 MG/DL — HIGH (ref 70–99)
GLUCOSE BLDC GLUCOMTR-MCNC: 178 MG/DL — HIGH (ref 70–99)
GLUCOSE BLDC GLUCOMTR-MCNC: 209 MG/DL — HIGH (ref 70–99)
GLUCOSE SERPL-MCNC: 168 MG/DL — HIGH (ref 70–99)
HCT VFR BLD CALC: 35.7 % — SIGNIFICANT CHANGE UP (ref 34.5–45)
HDLC SERPL-MCNC: 60 MG/DL — SIGNIFICANT CHANGE UP
HGB BLD-MCNC: 10.8 G/DL — LOW (ref 11.5–15.5)
LIPID PNL WITH DIRECT LDL SERPL: 43 MG/DL — SIGNIFICANT CHANGE UP
MAGNESIUM SERPL-MCNC: 2.2 MG/DL — SIGNIFICANT CHANGE UP (ref 1.6–2.6)
MCHC RBC-ENTMCNC: 18.2 PG — LOW (ref 27–34)
MCHC RBC-ENTMCNC: 30.3 G/DL — LOW (ref 32–36)
MCV RBC AUTO: 60 FL — LOW (ref 80–100)
NON HDL CHOLESTEROL: 63 MG/DL — SIGNIFICANT CHANGE UP
NRBC BLD AUTO-RTO: 0 /100 WBCS — SIGNIFICANT CHANGE UP (ref 0–0)
PHOSPHATE SERPL-MCNC: 3.6 MG/DL — SIGNIFICANT CHANGE UP (ref 2.5–4.5)
PLATELET # BLD AUTO: 244 K/UL — SIGNIFICANT CHANGE UP (ref 150–400)
POTASSIUM SERPL-MCNC: 4.2 MMOL/L — SIGNIFICANT CHANGE UP (ref 3.5–5.3)
POTASSIUM SERPL-SCNC: 4.2 MMOL/L — SIGNIFICANT CHANGE UP (ref 3.5–5.3)
RBC # BLD: 5.95 M/UL — HIGH (ref 3.8–5.2)
RBC # FLD: 18.4 % — HIGH (ref 10.3–14.5)
SODIUM SERPL-SCNC: 141 MMOL/L — SIGNIFICANT CHANGE UP (ref 135–145)
TRIGL SERPL-MCNC: 111 MG/DL — SIGNIFICANT CHANGE UP
TSH SERPL-MCNC: 3.39 UU/ML — SIGNIFICANT CHANGE UP (ref 0.34–4.82)
WBC # BLD: 8.05 K/UL — SIGNIFICANT CHANGE UP (ref 3.8–10.5)
WBC # FLD AUTO: 8.05 K/UL — SIGNIFICANT CHANGE UP (ref 3.8–10.5)

## 2025-02-13 PROCEDURE — 93016 CV STRESS TEST SUPVJ ONLY: CPT

## 2025-02-13 PROCEDURE — 36415 COLL VENOUS BLD VENIPUNCTURE: CPT

## 2025-02-13 PROCEDURE — A9502: CPT

## 2025-02-13 PROCEDURE — 87637 SARSCOV2&INF A&B&RSV AMP PRB: CPT

## 2025-02-13 PROCEDURE — 80048 BASIC METABOLIC PNL TOTAL CA: CPT

## 2025-02-13 PROCEDURE — 85027 COMPLETE CBC AUTOMATED: CPT

## 2025-02-13 PROCEDURE — 84443 ASSAY THYROID STIM HORMONE: CPT

## 2025-02-13 PROCEDURE — 78452 HT MUSCLE IMAGE SPECT MULT: CPT | Mod: 26

## 2025-02-13 PROCEDURE — 93306 TTE W/DOPPLER COMPLETE: CPT

## 2025-02-13 PROCEDURE — 85379 FIBRIN DEGRADATION QUANT: CPT

## 2025-02-13 PROCEDURE — 83036 HEMOGLOBIN GLYCOSYLATED A1C: CPT

## 2025-02-13 PROCEDURE — 84100 ASSAY OF PHOSPHORUS: CPT

## 2025-02-13 PROCEDURE — 83735 ASSAY OF MAGNESIUM: CPT

## 2025-02-13 PROCEDURE — 71046 X-RAY EXAM CHEST 2 VIEWS: CPT

## 2025-02-13 PROCEDURE — 80053 COMPREHEN METABOLIC PANEL: CPT

## 2025-02-13 PROCEDURE — 83880 ASSAY OF NATRIURETIC PEPTIDE: CPT

## 2025-02-13 PROCEDURE — 93005 ELECTROCARDIOGRAM TRACING: CPT

## 2025-02-13 PROCEDURE — 99285 EMERGENCY DEPT VISIT HI MDM: CPT

## 2025-02-13 PROCEDURE — 83690 ASSAY OF LIPASE: CPT

## 2025-02-13 PROCEDURE — 99233 SBSQ HOSP IP/OBS HIGH 50: CPT

## 2025-02-13 PROCEDURE — 93018 CV STRESS TEST I&R ONLY: CPT

## 2025-02-13 PROCEDURE — 99239 HOSP IP/OBS DSCHRG MGMT >30: CPT

## 2025-02-13 PROCEDURE — 84484 ASSAY OF TROPONIN QUANT: CPT

## 2025-02-13 PROCEDURE — 93017 CV STRESS TEST TRACING ONLY: CPT

## 2025-02-13 PROCEDURE — 82962 GLUCOSE BLOOD TEST: CPT

## 2025-02-13 PROCEDURE — 85025 COMPLETE CBC W/AUTO DIFF WBC: CPT

## 2025-02-13 PROCEDURE — 80061 LIPID PANEL: CPT

## 2025-02-13 PROCEDURE — 93306 TTE W/DOPPLER COMPLETE: CPT | Mod: 26

## 2025-02-13 PROCEDURE — 78452 HT MUSCLE IMAGE SPECT MULT: CPT | Mod: MC

## 2025-02-13 RX ADMIN — Medication 1: at 08:06

## 2025-02-13 RX ADMIN — Medication 5000 UNIT(S): at 08:05

## 2025-02-13 RX ADMIN — LIDOCAINE HYDROCHLORIDE 1 PATCH: 30 CREAM TOPICAL at 12:43

## 2025-02-13 RX ADMIN — Medication 1: at 17:03

## 2025-02-13 RX ADMIN — Medication 100 MILLIGRAM(S): at 08:05

## 2025-02-13 RX ADMIN — Medication 2: at 12:46

## 2025-02-13 RX ADMIN — Medication 3.12 MILLIGRAM(S): at 17:03

## 2025-02-13 RX ADMIN — Medication 3.12 MILLIGRAM(S): at 08:05

## 2025-02-13 NOTE — PROGRESS NOTE ADULT - NS ATTEND AMEND GEN_ALL_CORE FT
59 year old F with HTN, IDDM, ?asthma who presents with L sided chest pressure.    1) L sided CP, atypical in nature, not related to exertion  2) HTN  3) IDDM  - EKG showed sinus rhythm without ischemic changes  - Continue home ASA 81 and atorvastatin 20mg daily. Lipids well controlled  - TTE showed normal LV and RV function without significant valvular disease  - Nuclear stress test 2/13/25 without ischemia  - Patient can f/u with Dr. Cash at discharge

## 2025-02-13 NOTE — PROGRESS NOTE ADULT - SUBJECTIVE AND OBJECTIVE BOX
PRESENTING CC:    OVERNIGHT EVENTS:    SUBJECTIVE:         ------------------------  PMH:   PAST MEDICAL & SURGICAL HISTORY:  H/O: hypertension    H/O diabetes mellitus        Allergies    contrast media (iodine-based) (Other)    Intolerances        MEDICATIONS  (STANDING):  atorvastatin 20 milliGRAM(s) Oral at bedtime  carvedilol 3.125 milliGRAM(s) Oral every 12 hours  dextrose 5%. 1000 milliLiter(s) (50 mL/Hr) IV Continuous <Continuous>  dextrose 50% Injectable 25 Gram(s) IV Push once  glucagon  Injectable 1 milliGRAM(s) IntraMuscular once  heparin   Injectable 5000 Unit(s) SubCutaneous every 8 hours  hydrochlorothiazide 12.5 milliGRAM(s) Oral daily  insulin glargine Injectable (LANTUS) 22 Unit(s) SubCutaneous at bedtime  insulin lispro (ADMELOG) corrective regimen sliding scale   SubCutaneous three times a day before meals  insulin lispro (ADMELOG) corrective regimen sliding scale   SubCutaneous at bedtime  lidocaine   4% Patch 1 Patch Transdermal daily  losartan 100 milliGRAM(s) Oral daily    MEDICATIONS  (PRN):  acetaminophen     Tablet .. 650 milliGRAM(s) Oral every 6 hours PRN Temp greater or equal to 38C (100.4F), Mild Pain (1 - 3)  aluminum hydroxide/magnesium hydroxide/simethicone Suspension 30 milliLiter(s) Oral every 4 hours PRN Dyspepsia  benzocaine/menthol Lozenge 1 Lozenge Oral three times a day PRN cough  dextrose Oral Gel 15 Gram(s) Oral once PRN Blood Glucose LESS THAN 70 milliGRAM(s)/deciliter  melatonin 3 milliGRAM(s) Oral at bedtime PRN Insomnia  ondansetron Injectable 4 milliGRAM(s) IV Push every 8 hours PRN Nausea and/or Vomiting      FAMILY HISTORY:  Family history of diabetes mellitus (DM)    FH: HTN (hypertension)    FH: sudden death (Father)      Reviewed; no change from my prior note    SOCIAL HISTORY:  Reviewed, no change from my prior note    REVIEW OF SYSTEMS:  Constitutional: [ ] fever, [ ]weight loss,  [ ]fatigue  Eyes: [ ] visual changes  Respiratory: [ ]shortness of breath;  [ ] cough, [ ]wheezing, [ ]chills, [ ]hemoptysis  Cardiovascular: [ ] chest pain, [ ]palpitations, [ ]dizziness,  [ ]leg swelling [ ]syncope  Gastrointestinal: [ ] abdominal pain, [ ]nausea, [ ]vomiting,  [ ]diarrhea   Genitourinary: [ ] dysuria, [ ] hematuria  Neurologic: [ ] headaches [ ] tremors [ ] weakness [ ] lightheadedness  Skin: [ ] itching, [ ]burning, [ ] rashes  Endocrine: [ ] heat or cold intolerance  Musculoskeletal: [ ] joint pain or swelling; [ ] muscle, back, or extremity pain  Psychiatric: [ ] depression, [ ]anxiety, [ ]mood swings, or [ ]difficulty sleeping  Hematologic: [ ] easy bruising, [ ] bleeding gums    [x] All remaining systems negative except as per above.   [  ] Unable to obtain    Vital Signs Last 24 Hrs  T(C): 36.5 (13 Feb 2025 07:55), Max: 36.9 (12 Feb 2025 15:51)  T(F): 97.7 (13 Feb 2025 07:55), Max: 98.5 (12 Feb 2025 15:51)  HR: 72 (13 Feb 2025 07:55) (70 - 91)  BP: 134/73 (13 Feb 2025 07:55) (120/63 - 137/79)  BP(mean): --  RR: 18 (13 Feb 2025 07:55) (15 - 18)  SpO2: 100% (13 Feb 2025 07:55) (98% - 100%)    Parameters below as of 13 Feb 2025 07:55  Patient On (Oxygen Delivery Method): room air      I&O's Summary      PHYSICAL EXAM:  General: No acute distress  HEENT: EOMI  Neck: No JVD  Lungs: Clear to auscultation bilaterally; No rales or wheezing  Heart: Regular rate and rhythm; No murmurs, rubs, or gallops  Abdomen: Soft, non tender, non distended   Extremities: Warm, no edema   Nervous system:  Alert & Oriented X3  Psychiatric: Normal affect  Skin: No rashes or lesions    LABS:  02-13    141  |  108  |  36[H]  ----------------------------<  168[H]  4.2   |  25  |  1.45[H]    Ca    9.4      13 Feb 2025 05:15  Phos  3.6     02-13  Mg     2.2     02-13    TPro  7.8  /  Alb  3.5  /  TBili  0.9  /  DBili  x   /  AST  15  /  ALT  21  /  AlkPhos  111  02-12    Creatinine Trend: 1.45<--, 1.42<--                        10.8   8.05  )-----------( 244      ( 13 Feb 2025 05:15 )             35.7       Lipid Panel:   Cardiac Enzymes:         RADIOLOGY:    ECG [my interpretation]:    TELEMETRY:    ECHO:    STRESS TEST:    CATHETERIZATION:               PRESENTING CC: left chest pain    OVERNIGHT EVENTS: No new events overnight. Still had chest discomfort early this morning      PMH:   PAST MEDICAL & SURGICAL HISTORY:  H/O: hypertension    H/O diabetes mellitus        Allergies    contrast media (iodine-based) (Other)    Intolerances        MEDICATIONS  (STANDING):  atorvastatin 20 milliGRAM(s) Oral at bedtime  carvedilol 3.125 milliGRAM(s) Oral every 12 hours  dextrose 5%. 1000 milliLiter(s) (50 mL/Hr) IV Continuous <Continuous>  dextrose 50% Injectable 25 Gram(s) IV Push once  glucagon  Injectable 1 milliGRAM(s) IntraMuscular once  heparin   Injectable 5000 Unit(s) SubCutaneous every 8 hours  hydrochlorothiazide 12.5 milliGRAM(s) Oral daily  insulin glargine Injectable (LANTUS) 22 Unit(s) SubCutaneous at bedtime  insulin lispro (ADMELOG) corrective regimen sliding scale   SubCutaneous three times a day before meals  insulin lispro (ADMELOG) corrective regimen sliding scale   SubCutaneous at bedtime  lidocaine   4% Patch 1 Patch Transdermal daily  losartan 100 milliGRAM(s) Oral daily    MEDICATIONS  (PRN):  acetaminophen     Tablet .. 650 milliGRAM(s) Oral every 6 hours PRN Temp greater or equal to 38C (100.4F), Mild Pain (1 - 3)  aluminum hydroxide/magnesium hydroxide/simethicone Suspension 30 milliLiter(s) Oral every 4 hours PRN Dyspepsia  benzocaine/menthol Lozenge 1 Lozenge Oral three times a day PRN cough  dextrose Oral Gel 15 Gram(s) Oral once PRN Blood Glucose LESS THAN 70 milliGRAM(s)/deciliter  melatonin 3 milliGRAM(s) Oral at bedtime PRN Insomnia  ondansetron Injectable 4 milliGRAM(s) IV Push every 8 hours PRN Nausea and/or Vomiting      FAMILY HISTORY:  Family history of diabetes mellitus (DM)    FH: HTN (hypertension)    FH: sudden death (Father)      Reviewed; no change from my prior note    SOCIAL HISTORY:  Reviewed, no change from my prior note    REVIEW OF SYSTEMS:  Constitutional: [ ] fever, [ ]weight loss,  [ ]fatigue  Eyes: [ ] visual changes  Respiratory: [ ]shortness of breath;  [ ] cough, [ ]wheezing, [ ]chills, [ ]hemoptysis  Cardiovascular: [ x] chest pain, [ ]palpitations, [ ]dizziness,  [ ]leg swelling [ ]syncope  Gastrointestinal: [ ] abdominal pain, [ ]nausea, [ ]vomiting,  [ ]diarrhea   Genitourinary: [ ] dysuria, [ ] hematuria  Neurologic: [ ] headaches [ ] tremors [ ] weakness [ ] lightheadedness  Skin: [ ] itching, [ ]burning, [ ] rashes  Endocrine: [ ] heat or cold intolerance  Musculoskeletal: [ ] joint pain or swelling; [ ] muscle, back, or extremity pain  Psychiatric: [ ] depression, [ ]anxiety, [ ]mood swings, or [ ]difficulty sleeping  Hematologic: [ ] easy bruising, [ ] bleeding gums    [x] All remaining systems negative except as per above.   [  ] Unable to obtain    Vital Signs Last 24 Hrs  T(C): 36.5 (13 Feb 2025 07:55), Max: 36.9 (12 Feb 2025 15:51)  T(F): 97.7 (13 Feb 2025 07:55), Max: 98.5 (12 Feb 2025 15:51)  HR: 72 (13 Feb 2025 07:55) (70 - 91)  BP: 134/73 (13 Feb 2025 07:55) (120/63 - 137/79)  BP(mean): --  RR: 18 (13 Feb 2025 07:55) (15 - 18)  SpO2: 100% (13 Feb 2025 07:55) (98% - 100%)    Parameters below as of 13 Feb 2025 07:55  Patient On (Oxygen Delivery Method): room air      I&O's Summary      PHYSICAL EXAM:  General: No acute distress  HEENT: EOMI  Neck: No JVD  Lungs: Clear to auscultation bilaterally; No rales or wheezing  Heart: Regular rate and rhythm; No murmurs, rubs, or gallops  Abdomen: Soft, non tender, non distended   Extremities: Warm, no edema   Nervous system:  Alert & Oriented X3  Psychiatric: Normal affect  Skin: No rashes or lesions    LABS:  02-13    141  |  108  |  36[H]  ----------------------------<  168[H]  4.2   |  25  |  1.45[H]    Ca    9.4      13 Feb 2025 05:15  Phos  3.6     02-13  Mg     2.2     02-13    TPro  7.8  /  Alb  3.5  /  TBili  0.9  /  DBili  x   /  AST  15  /  ALT  21  /  AlkPhos  111  02-12    Creatinine Trend: 1.45<--, 1.42<--                        10.8   8.05  )-----------( 244      ( 13 Feb 2025 05:15 )             35.7       Lipid Panel:   Cardiac Enzymes:         RADIOLOGY:   < from: Xray Chest 2 Views PA/Lat (02.12.25 @ 06:07) >  IMPRESSION: No focal infiltrate or congestion. Heart is within normal   limits in its transthoracic diameter. Regional osseous structures   appropriate for age    --- End of Report ---    < end of copied text >    ECG: sinus rhythm without significant abnormalities      TELEMETRY: NSR HR range 60-80    ECHO:   < from: TTE W or WO Ultrasound Enhancing Agent (02.13.25 @ 07:52) >     CONCLUSIONS:      1. Left ventricular systolic function is normal with an ejection fraction of 58 % by Alves's method of disks.   2. Normal left ventricular diastolic function.   3. Normal right ventricular systolic function.   4. Trace mitral regurgitation.   5. Trace pulmonic regurgitation.   6. No pericardial effusion seen.   7. No prior echocardiogram is available for comparison.    ________________________________________________________________________________________  FINDINGS:    < end of copied text >      < from: Nuclear Stress Test-Pharmacologic.. (02.13.25 @ 09:02) >  Normal myocardial perfusion scan, with no evidence of infarction or inducible ischemia.     Quantitative Imaging Findings    +----------------+---------+  |LVEF Post Stress|TID Ratio|  +----------------+---------+  |      86 %      |  1.11   |  +----------------+---------+       Left Ventricular Motion: Normal left ventricular regional wall motion.    < end of copied text >      Assessment and Recommendation:   · Assessment	  Assessment and Recommendations:  59 year old F with HTN, IDDM, ?asthma who presents with L sided chest pressure.  Cardiology was consulted    1) L sided CP, atypical in nature, not related to exertion  2) HTN-stable  3) IDDM  - EKG showed sinus rhythm without ischemic changes  - TTE -EF 58%, no wall motion abnormalities  -nuclear stress test- no evidence of infarction or inducible ischemia.  -continue home BP med. She is on Coreg 3.125mg PO BID. Losartan 10mg PO daily, HCTZ 12.5mf PO daily  -no need for any other impatient cardiac testing     PRESENTING CC: left chest pain    OVERNIGHT EVENTS: No new events overnight. Still had chest discomfort early this morning      PMH:   PAST MEDICAL & SURGICAL HISTORY:  H/O: hypertension    H/O diabetes mellitus        Allergies    contrast media (iodine-based) (Other)    Intolerances        MEDICATIONS  (STANDING):  atorvastatin 20 milliGRAM(s) Oral at bedtime  carvedilol 3.125 milliGRAM(s) Oral every 12 hours  dextrose 5%. 1000 milliLiter(s) (50 mL/Hr) IV Continuous <Continuous>  dextrose 50% Injectable 25 Gram(s) IV Push once  glucagon  Injectable 1 milliGRAM(s) IntraMuscular once  heparin   Injectable 5000 Unit(s) SubCutaneous every 8 hours  hydrochlorothiazide 12.5 milliGRAM(s) Oral daily  insulin glargine Injectable (LANTUS) 22 Unit(s) SubCutaneous at bedtime  insulin lispro (ADMELOG) corrective regimen sliding scale   SubCutaneous three times a day before meals  insulin lispro (ADMELOG) corrective regimen sliding scale   SubCutaneous at bedtime  lidocaine   4% Patch 1 Patch Transdermal daily  losartan 100 milliGRAM(s) Oral daily    MEDICATIONS  (PRN):  acetaminophen     Tablet .. 650 milliGRAM(s) Oral every 6 hours PRN Temp greater or equal to 38C (100.4F), Mild Pain (1 - 3)  aluminum hydroxide/magnesium hydroxide/simethicone Suspension 30 milliLiter(s) Oral every 4 hours PRN Dyspepsia  benzocaine/menthol Lozenge 1 Lozenge Oral three times a day PRN cough  dextrose Oral Gel 15 Gram(s) Oral once PRN Blood Glucose LESS THAN 70 milliGRAM(s)/deciliter  melatonin 3 milliGRAM(s) Oral at bedtime PRN Insomnia  ondansetron Injectable 4 milliGRAM(s) IV Push every 8 hours PRN Nausea and/or Vomiting    FAMILY HISTORY:  Family history of diabetes mellitus (DM)    FH: HTN (hypertension)    FH: sudden death (Father)    Reviewed; no change from my prior note    SOCIAL HISTORY:  Reviewed, no change from my prior note    REVIEW OF SYSTEMS:  Constitutional: [ ] fever, [ ]weight loss,  [ ]fatigue  Eyes: [ ] visual changes  Respiratory: [ ]shortness of breath;  [ ] cough, [ ]wheezing, [ ]chills, [ ]hemoptysis  Cardiovascular: [ x] chest pain, [ ]palpitations, [ ]dizziness,  [ ]leg swelling [ ]syncope  Gastrointestinal: [ ] abdominal pain, [ ]nausea, [ ]vomiting,  [ ]diarrhea   Genitourinary: [ ] dysuria, [ ] hematuria  Neurologic: [ ] headaches [ ] tremors [ ] weakness [ ] lightheadedness  Skin: [ ] itching, [ ]burning, [ ] rashes  Endocrine: [ ] heat or cold intolerance  Musculoskeletal: [ ] joint pain or swelling; [ ] muscle, back, or extremity pain  Psychiatric: [ ] depression, [ ]anxiety, [ ]mood swings, or [ ]difficulty sleeping  Hematologic: [ ] easy bruising, [ ] bleeding gums    [x] All remaining systems negative except as per above.   [  ] Unable to obtain    Vital Signs Last 24 Hrs  T(C): 36.5 (13 Feb 2025 07:55), Max: 36.9 (12 Feb 2025 15:51)  T(F): 97.7 (13 Feb 2025 07:55), Max: 98.5 (12 Feb 2025 15:51)  HR: 72 (13 Feb 2025 07:55) (70 - 91)  BP: 134/73 (13 Feb 2025 07:55) (120/63 - 137/79)  BP(mean): --  RR: 18 (13 Feb 2025 07:55) (15 - 18)  SpO2: 100% (13 Feb 2025 07:55) (98% - 100%)    Parameters below as of 13 Feb 2025 07:55  Patient On (Oxygen Delivery Method): room air      I&O's Summary      PHYSICAL EXAM:  General: No acute distress  HEENT: EOMI  Neck: No JVD  Lungs: Clear to auscultation bilaterally; No rales or wheezing  Heart: Regular rate and rhythm; No murmurs, rubs, or gallops  Abdomen: Soft, non tender, non distended   Extremities: Warm, no edema   Nervous system:  Alert & Oriented X3  Psychiatric: Normal affect  Skin: No rashes or lesions    LABS:  02-13    141  |  108  |  36[H]  ----------------------------<  168[H]  4.2   |  25  |  1.45[H]    Ca    9.4      13 Feb 2025 05:15  Phos  3.6     02-13  Mg     2.2     02-13    TPro  7.8  /  Alb  3.5  /  TBili  0.9  /  DBili  x   /  AST  15  /  ALT  21  /  AlkPhos  111  02-12    Creatinine Trend: 1.45<--, 1.42<--                        10.8   8.05  )-----------( 244      ( 13 Feb 2025 05:15 )             35.7       Lipid Panel:   Cardiac Enzymes:         RADIOLOGY:   < from: Xray Chest 2 Views PA/Lat (02.12.25 @ 06:07) >  IMPRESSION: No focal infiltrate or congestion. Heart is within normal   limits in its transthoracic diameter. Regional osseous structures   appropriate for age    --- End of Report ---    < end of copied text >    ECG: sinus rhythm without significant abnormalities      TELEMETRY: NSR HR range 60-80    ECHO:   < from: TTE W or WO Ultrasound Enhancing Agent (02.13.25 @ 07:52) >     CONCLUSIONS:      1. Left ventricular systolic function is normal with an ejection fraction of 58 % by Alves's method of disks.   2. Normal left ventricular diastolic function.   3. Normal right ventricular systolic function.   4. Trace mitral regurgitation.   5. Trace pulmonic regurgitation.   6. No pericardial effusion seen.   7. No prior echocardiogram is available for comparison.    ________________________________________________________________________________________  FINDINGS:    < end of copied text >      < from: Nuclear Stress Test-Pharmacologic.. (02.13.25 @ 09:02) >  Normal myocardial perfusion scan, with no evidence of infarction or inducible ischemia.     Quantitative Imaging Findings    +----------------+---------+  |LVEF Post Stress|TID Ratio|  +----------------+---------+  |      86 %      |  1.11   |  +----------------+---------+       Left Ventricular Motion: Normal left ventricular regional wall motion.    < end of copied text >

## 2025-02-13 NOTE — PROGRESS NOTE ADULT - PROBLEM SELECTOR PLAN 5
Pt takes atorvastatin 20 mg at home  Lipid panel 11/2024: chol 166, tg 144, hdl 72, ldl 65    - c/w home med  - f/u lipid profile AM   - Dash Diet

## 2025-02-13 NOTE — DISCHARGE NOTE PROVIDER - PROVIDER TOKENS
FREE:[LAST:[Beth],FIRST:[Bridgette],PHONE:[(   )    -],FAX:[(   )    -],ADDRESS:[[facility]],SCHEDULEDAPPT:[02/26/2025],SCHEDULEDAPPTTIME:[04:20 PM],ESTABLISHEDPATIENT:[T]],PROVIDER:[TOKEN:[[id number]]]

## 2025-02-13 NOTE — DISCHARGE NOTE PROVIDER - CARE PROVIDER_API CALL
Bridgette Campos  Endocrinology  [facility]  Phone: (   )    -  Fax: (   )    -  Established Patient  Scheduled Appointment: 02/26/2025 04:20 PM    Cristian Cash  Cardiovascular Disease  [address], [address] [address]  Phone: [phone]  Fax: [phone]  Follow Up Time:

## 2025-02-13 NOTE — PROGRESS NOTE ADULT - PROBLEM SELECTOR PLAN 4
h/o DM on lantus 38 units, SSI, farxiga and mounjaro (last injection Monday)  A1c 8.6 in Nov    will  hold oral dm meds while inpatient   c/w lantus 20u + sliding scale  Adjust insulin as indicated  FS ACHS  DM diet   f/u A1c AM

## 2025-02-13 NOTE — DISCHARGE NOTE PROVIDER - NSDCCPCAREPLAN_GEN_ALL_CORE_FT
PRINCIPAL DISCHARGE DIAGNOSIS  Diagnosis: Chest pressure  Assessment and Plan of Treatment: You presented to the ED with a chief complaint of chest pain and pressure for the past 10 days, which can be concerning for acute coronary syndrome. An ECG performed in the ED showed no abnormalities. Blood work perfored in the ED repeatedly showed normal levels of cardiac enzymes. You were admitted to the medical service and a Cardiologist was consulted, who recommended a TTE and nuclear stress test. The ultrasound of the heart (TTE) was performed and normal. The stress test showed normal blood flow to your heart. You chest pressure was likely musculosceletal and not caused by your heart.  Please continue to take your home medication and follow up with your PCP within one week after discharge.      SECONDARY DISCHARGE DIAGNOSES  Diagnosis: ACS (acute coronary syndrome)  Assessment and Plan of Treatment:     Diagnosis: DM (diabetes mellitus)  Assessment and Plan of Treatment:     Diagnosis: HTN (hypertension)  Assessment and Plan of Treatment:      PRINCIPAL DISCHARGE DIAGNOSIS  Diagnosis: Chest pressure  Assessment and Plan of Treatment: You presented to the ED with a chief complaint of chest pain and pressure for the past 10 days, which can be concerning for acute coronary syndrome. An ECG performed in the ED showed no abnormalities. Blood work perfored in the ED repeatedly showed normal levels of cardiac enzymes. You were admitted to the medical service and a Cardiologist was consulted, who recommended a TTE and nuclear stress test. The ultrasound of the heart (TTE) was performed and normal. The stress test showed normal blood flow to your heart. You chest pressure was likely musculosceletal and not caused by your heart.  Please continue to take your home medication and follow up with your PCP within one week after discharge.      SECONDARY DISCHARGE DIAGNOSES  Diagnosis: DM (diabetes mellitus)  Assessment and Plan of Treatment: You have a history of diabetes, for which you are taking Insulin, farxiga and Mounjaro. Your A1c (long term blood glucose value) was elevated to 8.9%, indicating poor control of your diabetes.   IT IS VERY IMPORTANT THAT YOU FOLLOW UP WITH YOUR OUTPATIENT ENDOCRINOLOGIST DR. GRIDER TO FURTHER OPTIMIZE YOUR DIABETES MEDICATION. Please continue to take all your medications as prescribed and follow up with your PCP within one week after discharge.    Diagnosis: HTN (hypertension)  Assessment and Plan of Treatment: You have a history of Hypertension. For which you are taking Carvedilol and hydrochlorothiazide and lisinopril at home.  On this admission, your Blood Pressure was adequately controlled with your home medication.  Your blood pressure target is 130/90, maintain healthy lifestyle, low salt diet, avoid fatty food, weight loss, exercise regularly or stay active as tolerated 30 mins X 3 times per week.  Notify your doctor if you have any of the following symptoms:   (Dizziness, Lightheadedness, Blurry vision, Headache, Chest pain, Shortness of breath.)  Please continue taking your home medications and follow-up with your PCP in 1 week from discharge to adjust medications as needed.    Diagnosis: HLD (hyperlipidemia)  Assessment and Plan of Treatment: You have history of Hyperlipidemia. You are taking Atorvastatin 20mg at home.   Please take your medication as prescribed. Maintain healthy lifestyle, low fat diet, exercise regularly and check your lipid levels routinely.   Please follow up with your PCP in 1 week from discharge.    Diagnosis: Neuropathy  Assessment and Plan of Treatment: You have a history of neuropathy, you are not on any medication at home. Please continue to take your other medicaiton as prescribed and follow up with your PCP within one week after discharge.

## 2025-02-13 NOTE — PROGRESS NOTE ADULT - PROBLEM SELECTOR PLAN 3
h/o HTN on hctz, [id number], coreg 3.125 bid  Monitor BP  c/w home meds w/ holding parameters   DASH diet

## 2025-02-13 NOTE — DISCHARGE NOTE NURSING/CASE MANAGEMENT/SOCIAL WORK - PATIENT PORTAL LINK FT
You can access the FollowMyHealth Patient Portal offered by [facility] by registering at the following website: [email or web address]. By joining "[employer]’s FollowMyHealth portal, you will also be able to view your health information using other applications (apps) compatible with our system.

## 2025-02-13 NOTE — DISCHARGE NOTE NURSING/CASE MANAGEMENT/SOCIAL WORK - FINANCIAL ASSISTANCE
[facility] provides services at a reduced cost to those who are determined to be eligible through [facility]’s financial assistance program. Information regarding [facility]’s financial assistance program can be found by going to [email or web address] or by calling [phone].

## 2025-02-13 NOTE — DISCHARGE NOTE NURSING/CASE MANAGEMENT/SOCIAL WORK - NSDCPEFALRISK_GEN_ALL_CORE
For information on Fall & Injury Prevention, visit: [email or web address] OR  [email or web address] OR  https://www.cdc.gov/steadi/patient.html

## 2025-02-13 NOTE — PROGRESS NOTE ADULT - PROBLEM SELECTOR PLAN 1
P/w 10 days of left-sided chest pressure  EKG showing nsr with low-voltage QRS complexes  trop neg x2  HEART score 3 points  s/p 2 asa at home    -telemetry  -TTE  -f/u lipids  -f/u A1c  -cardiology consultws - Dr. Norris  stress test in AM

## 2025-02-13 NOTE — PROGRESS NOTE ADULT - PROBLEM SELECTOR PLAN 6
Hx of chronic intermittent LUE numbness with numbness of BL palms   Has been worked up as PO in the past     c/w monitor for now

## 2025-02-13 NOTE — PROGRESS NOTE ADULT - ASSESSMENT
PATIENT HISTORY:    Steve Morgan is a 61 year old male who presents to clinic for lesion to left foot. Is diabetic and wants to make sure it is not an ulcer. No drainage to area. Has been there for a month or so. Denies injury.  Denies fever, chills, nausa.     Review of Systems:  Patient denies fever, chills, rash, wound, stiffness, limping, weakness, heart burn, blood in stool, chest pain with activity, calf pain when walking, shortness of breath with activity, chronic cough, easy bleeding/bruising, swelling of ankles, excessive thirst, fatigue, depression, anxiety.  Patient admits to [facility].     PAST MEDICAL HISTORY:   Past Medical History:   Diagnosis Date     Cellulitis and abscess of trunk 6/27/2017     Depression      Hyperlipidemia LDL goal <100 10/31/2010     Hypertension goal BP (blood pressure) < 140/90 3/17/2011     Hypothyroidism 1/12/2010     Morbid obesity due to excess calories (H) 1/12/2010     Neuropathy in diabetes (H) 1/12/2010     Obesity 1/12/2010     Sleep apnea 1/12/2010    CPAP     Type 2 diabetes, HbA1C goal < 8% (H) 3/8/2011        PAST SURGICAL HISTORY:   Past Surgical History:   Procedure Laterality Date     COLONOSCOPY       GENITOURINARY SURGERY      surg for undescended testicle     REPAIR HAMMER TOE BILATERAL  5/16/2013    Procedure: REPAIR HAMMER TOE BILATERAL;  Flexor Tenotomy Toes 2,3,4,5 Bilateral Feet;  Surgeon: Saad Bangura DPM;  Location:  OR        MEDICATIONS:   Current Outpatient Medications:      ammonium lactate (LAC-HYDRIN) 12 % cream, Apply topically 2 times daily as needed for dry skin, Disp: 385 g, Rfl: 3     ASPIRIN 81 MG OR TABS, ONE DAILY, Disp: 100, Rfl: 3     atenolol (TENORMIN) 25 MG tablet, Take 1 tablet (25 mg) by mouth daily, Disp: 90 tablet, Rfl: 0     atovaquone-proguanil (MALARONE) 250-100 MG tablet, Take 1 tablet by mouth daily Start 2 days before travel and continue 7 days after return., Disp: 16 tablet, Rfl: 0     azithromycin (ZITHROMAX) 
500 MG tablet, Take 1 tablet (500 mg) by mouth daily for 3 days For traveler's diarrhea., Disp: 3 tablet, Rfl: 0     blood glucose (ONE TOUCH VERIO IQ) test strip, Use to test blood sugars 2 times daily or as directed., Disp: 100 strip, Rfl: 0     blood glucose monitoring (KERLINE CONTOUR NEXT) test strip, Use to test blood sugar 3 times daily or as directed., Disp: 100 strip, Rfl: 11     blood glucose monitoring (KERLINE MICROLET) lancets, Use to test blood sugar 3 times daily or as directed., Disp: 100 each, Rfl: 11     blood glucose monitoring (ONE TOUCH DELICA) lancets, Use to test blood sugars 2 times daily or as directed., Disp: 1 Box, Rfl: prn     buPROPion (WELLBUTRIN XL) 300 MG 24 hr tablet, Take 1 tablet (300 mg) by mouth every morning, Disp: 90 tablet, Rfl: 1     Calcium Carb-Cholecalciferol (CALCIUM 600 + D PO), Take 1 tablet by mouth daily, Disp: , Rfl:      citalopram (CELEXA) 20 MG tablet, TAKE 1 TABLET (20 MG) BY MOUTH DAILY, Disp: 90 tablet, Rfl: 1     Continuous Blood Gluc  (FREESTYLE GIULIANA 14 DAY READER) SIENA, 1 each continuous, Disp: 1 Device, Rfl: 0     Continuous Blood Gluc Sensor (FREESTYLE GIULIANA 14 DAY SENSOR) MISC, 1 each every 14 days, Disp: 2 each, Rfl: 11     Cyanocobalamin (VITAMIN B 12 PO), Take 250 mcg by mouth daily, Disp: , Rfl:      diclofenac (VOLTAREN) 1 % topical gel, Apply 4 grams to knees or 2 grams to hands four times daily using enclosed dosing card., Disp: 100 g, Rfl: 3     ferrous sulfate 325 (65 FE) MG tablet, Take 1 tablet by mouth daily (with breakfast)., Disp: , Rfl:      finasteride (PROSCAR) 5 MG tablet, Take 1 tablet (5 mg) by mouth daily, Disp: 90 tablet, Rfl: 0     glipiZIDE (GLUCOTROL XL) 10 MG 24 hr tablet, Take 2 tablets (20 mg) by mouth every morning, Disp: 180 tablet, Rfl: 1     hydrocortisone (WESTCORT) 0.2 % cream, Apply topically 2 times daily as needed Apply sparingly to affected area, BID., Disp: 45 g, Rfl: 1     insulin glargine (LANTUS SOLOSTAR PEN) 
100 UNIT/ML pen, 30 units qd.  Increase as directed to max dose of 45 units qd., Disp: 45 mL, Rfl: 1     insulin glargine (LANTUS SOLOSTAR) 100 UNIT/ML pen, Inject 26 Units Subcutaneous daily, Disp: 15 mL, Rfl: 2     insulin pen needle (B-D U/F) 31G X 5 MM miscellaneous, USE 1 DAILY OR AS DIRECTED., Disp: 100 each, Rfl: 2     levothyroxine (LEVOXYL) 125 MCG tablet, Take 1 tablet (125 mcg) by mouth daily Dispense name brand Levoxyl, no generics, Disp: 30 tablet, Rfl: 11     losartan (COZAAR) 50 MG tablet, Take 1 tablet (50 mg) by mouth daily, Disp: 90 tablet, Rfl: 3     metFORMIN (GLUCOPHAGE) 1000 MG tablet, TAKE 1 TABLET (1,000 MG) BY MOUTH 2 TIMES DAILY (WITH MEALS), Disp: 180 tablet, Rfl: 3     MULTI-VITAMIN OR TABS, 1 TABLET DAILY, Disp: 30, Rfl: 0     omeprazole-sodium bicarbonate (ZEGERID)  MG per capsule, Take 1 capsule by mouth every morning (before breakfast), Disp: , Rfl:      order for DME, Equipment being ordered: Please dispense up to 3 pairs of knee high compression stockings at 20-30 mmHg and one donning device if needed., Disp: 4 Device, Rfl: 0     order for DME, Equipment being ordered: Lt wrist splint, Disp: 1 Device, Rfl: 0     ORDER FOR DME, Equipment being ordered: four pronged cane, Disp: 1 Units, Rfl: 0     ORDER FOR DME, Equipment being ordered: single cane with rubber foot, Disp: 1 Units, Rfl: 0     simvastatin (ZOCOR) 20 MG tablet, Take 1 tablet (20 mg) by mouth At Bedtime, Disp: 90 tablet, Rfl: 3     UNABLE TO FIND, MEDICATION NAME: Prednisolone Acetate, Gatifloxacin and Bromfenac 1/0.5/0.075% - instill one drop in the surgery eye three times daily beginning 2 days before surgery, Disp: , Rfl:      VITAMIN D, CHOLECALCIFEROL, PO, Take 1,000 Units by mouth daily., Disp: , Rfl:      ALLERGIES:  No Known Allergies     SOCIAL HISTORY:   Social History     Socioeconomic History     Marital status:      Spouse name: Sri     Number of children: 2     Years of education: Not on file 
"    Highest education level: Not on file   Occupational History     Occupation:      Employer: NONE      Comment: [employer]   Social Needs     Financial resource strain: Not on file     Food insecurity:     Worry: Not on file     Inability: Not on file     Transportation needs:     Medical: Not on file     Non-medical: Not on file   Tobacco Use     Smoking status: Never Smoker     Smokeless tobacco: Never Used   Substance and Sexual Activity     Alcohol use: Yes     Comment: occ     Drug use: No     Sexual activity: Not Currently     Partners: Female   Lifestyle     Physical activity:     Days per week: Not on file     Minutes per session: Not on file     Stress: Not on file   Relationships     Social connections:     Talks on phone: Not on file     Gets together: Not on file     Attends [employer] service: Not on file     Active member of club or organization: Not on file     Attends meetings of clubs or organizations: Not on file     Relationship status: Not on file     Intimate partner violence:     Fear of current or ex partner: Not on file     Emotionally abused: Not on file     Physically abused: Not on file     Forced sexual activity: Not on file   Other Topics Concern     Parent/sibling w/ CABG, MI or angioplasty before 65F 55M? No   Social History Narrative     Not on file        FAMILY HISTORY:   Family History   Problem Relation Age of Onset     Breast Cancer Mother      Hypertension Mother      Thyroid Disease Mother      Depression Mother      Cancer - colorectal Father      Thyroid Disease Father      Depression Father      Heart Disease Maternal Grandmother         CHF     Circulatory Paternal Grandmother      Cancer Paternal Grandfather      Diabetes Brother      Diabetes Brother         EXAM:Vitals: /78   Ht 1.651 m (5' 5\")   Wt 101.2 kg (223 lb)   BMI 37.11 kg/m      A1C: 11.8 (12/2019)    General appearance: Patient is alert and fully cooperative with history & exam.  No sign "
of distress is noted during the visit.     Psychiatric: Affect is pleasant & appropriate.  Patient appears motivated to improve health.     Respiratory: Breathing is regular & unlabored while sitting.     HEENT: Hearing is intact to spoken word.  Speech is clear.  No gross evidence of visual impairment that would impact ambulation.     Dermatologic: pre ulcerative hyperkeratotic lesion to plantar medial left 1st metatarsal phalangeal joint. No open lesions or signs of infection.      Vascular: DP & PT pulses are intact & regular bilaterally.  No significant edema or varicosities noted.  CFT and skin temperature is normal to both lower extremities.     Neurologic: Lower extremity sensation is diminished to both feet.      Musculoskeletal: Patient is ambulatory without assistive device or brace.  Decrease arch height. Minimal pain on palpation to the sinus tarsi left ankle. Muscle strength is 5/5 without pain to both feet.     ASSESSMENT:    Diabetic polyneuropathy associated with type 2 diabetes mellitus (H)  Pre-ulcerative calluses     PLAN:  Reviewed patient's chart in epic. Talked about diabetes and her feet. Discussed causes of keratomas.  They are due to areas of increase friction.  Hammertoes can create these as they put more pressure to the metatarsal head.  Discussed treatments such as using foot file, pumice stone, metatarsal pads, orthotics, and not walking barefoot.     We discussed the cost structure of callus care if they were to come back and have it treated in the clinic if insurance does not cover it and explained that they would be billed. They were also provided information on places to get the callus treatment.    Discussed these could lead to ulcers. Recommend lotion feet daily and using pumice stone.      Tena Mathis DPM, Podiatry/Foot and Ankle Surgery    Weight management plan: Patient was referred to their PCP to discuss a diet and exercise plan.    Recommended to Steve Morgan to follow 
up with Primary Care provider regarding elevated blood pressure.      
Assessment and Recommendations:  59 year old F with HTN, IDDM, ?asthma who presents with L sided chest pressure.  Cardiology was consulted    1) L sided CP, atypical in nature, not related to exertion  2) HTN-stable  3) IDDM  - EKG showed sinus rhythm without ischemic changes  - TTE -EF 58%, no wall motion abnormalities  -nuclear stress test- no evidence of infarction or inducible ischemia.  -continue home BP med. She is on Coreg 3.125mg PO BID. Losartan 10mg PO daily, HCTZ 12.5mg PO daily  -no need for any other inpatient cardiac testing
59-year-old female, from home, ambulates independently, history of hypertension, insulin-dependent diabetes, asthma, chronic intermittent left upper arm numbness presenting with chief complaint of left-sided chest pressure and tightness x10 days.  EKG nsr with low-voltage QRS complexes. Trops neg x2. Admitted to telemetry for atypical chest pain workup, scheduled for stress test in AM

## 2025-02-13 NOTE — DISCHARGE NOTE PROVIDER - ATTENDING DISCHARGE PHYSICAL EXAMINATION:
I, Chadd Snyder, am the last provider completing and signing this document after my resident or NP has started the document. I have personally seen and examined the patient. I have collaborated with and supervised the midlevel practitioner on the discharge service for the patient. I agree with everything as documented by the midlevel practitioner. I have reviewed and made amendments to the documentation where necessary.    I was present with the Resident during the key portions of the history and exam. I discussed the case with the Resident and agree with the findings and plan as documented in the Resident 's note, unless noted below.    My physical exam on day of discharge:  Alert, answering qs appropriately, following commands  no murmurs heard  breathing comfortably  abdomen NT/ND BS+

## 2025-02-13 NOTE — DISCHARGE NOTE PROVIDER - NSDCMRMEDTOKEN_GEN_ALL_CORE_FT
atorvastatin 20 mg oral tablet: 1 orally once a day  Basaglar KwikPen 100 units/mL subcutaneous solution: 38 unit(s) subcutaneous once a day (at bedtime)  Coreg 3.125 mg oral tablet: 1 tab(s) orally 2 times a day  Farxiga 10 mg oral tablet: 1 orally once a day  hydroCHLOROthiazide 12.5 mg oral capsule: 1 cap(s) orally once a day  Lancets: Lancets (covered by patient&#x27;s insurance)  Lancets: Lancets (covered by patient&#x27;s insurance)  losartan 100 mg oral tablet: 1 tab(s) orally once a day  Mounjaro 10 mg/0.5 mL subcutaneous solution: 10 milligram(s) subcutaneously once a week last dose was on  Monday  NovoLOG FlexPen 100 units/mL injectable solution: 4 unit(s) injectable 3 times a day 4 U in morning, 6 U at lunch, 8 U at dinner based on finger stick

## 2025-02-13 NOTE — PROGRESS NOTE ADULT - SUBJECTIVE AND OBJECTIVE BOX
PGY-1 Progress Note discussed with attending    Allen Samuels via Teams TILL 5:00 PM  PLEASE CONTACT ON CALL TEAM:  - On Call Team (Please refer to Gene) FROM 5:00 PM - 8:30PM  - Nightfloat Team FROM 8:30 -7:30 AM    CHIEF COMPLAINT & BRIEF HOSPITAL COURSE:    INTERVAL HPI/OVERNIGHT EVENTS:       REVIEW OF SYSTEMS:  CONSTITUTIONAL: No fever, weight loss, or fatigue  RESPIRATORY: No cough, wheezing, chills or hemoptysis; No shortness of breath  CARDIOVASCULAR: No chest pain, palpitations, dizziness, or leg swelling  GASTROINTESTINAL: No abdominal pain. No nausea, vomiting, or hematemesis; No diarrhea or constipation. No melena or hematochezia.  GENITOURINARY: No dysuria or hematuria, urinary frequency  NEUROLOGICAL: No headaches, memory loss, loss of strength, numbness, or tremors  SKIN: No itching, burning, rashes, or lesions     Vital Signs Last 24 Hrs  T(C): 36.5 (13 Feb 2025 07:55), Max: 36.9 (12 Feb 2025 15:51)  T(F): 97.7 (13 Feb 2025 07:55), Max: 98.5 (12 Feb 2025 15:51)  HR: 72 (13 Feb 2025 07:55) (70 - 91)  BP: 134/73 (13 Feb 2025 07:55) (120/63 - 137/79)  BP(mean): --  RR: 18 (13 Feb 2025 07:55) (15 - 19)  SpO2: 100% (13 Feb 2025 07:55) (98% - 100%)    Parameters below as of 13 Feb 2025 07:55  Patient On (Oxygen Delivery Method): room air        PHYSICAL EXAMINATION:  GENERAL: NAD, well built  HEAD:  Atraumatic, Normocephalic  EYES:  conjunctiva and sclera clear  NECK: Supple, No JVD, Normal thyroid  CHEST/LUNG: Clear to auscultation. Clear to percussion bilaterally; No rales, rhonchi, wheezing, or rubs  HEART: Regular rate and rhythm; No murmurs, rubs, or gallops  ABDOMEN: Soft, Nontender, Nondistended; Bowel sounds present  NERVOUS SYSTEM:  Alert & Oriented X3,    EXTREMITIES:  2+ Peripheral Pulses, No clubbing, cyanosis, or edema  SKIN: warm dry                          10.8   8.05  )-----------( 244      ( 13 Feb 2025 05:15 )             35.7     02-13    141  |  108  |  36[H]  ----------------------------<  168[H]  4.2   |  25  |  1.45[H]    Ca    9.4      13 Feb 2025 05:15  Phos  3.6     02-13  Mg     2.2     02-13    TPro  7.8  /  Alb  3.5  /  TBili  0.9  /  DBili  x   /  AST  15  /  ALT  21  /  AlkPhos  111  02-12    LIVER FUNCTIONS - ( 12 Feb 2025 06:26 )  Alb: 3.5 g/dL / Pro: 7.8 g/dL / ALK PHOS: 111 U/L / ALT: 21 U/L DA / AST: 15 U/L / GGT: x                   CAPILLARY BLOOD GLUCOSE      RADIOLOGY & ADDITIONAL TESTS:

## 2025-02-13 NOTE — DISCHARGE NOTE PROVIDER - HOSPITAL COURSE
59-year-old female, from home, ambulates independently, history of hypertension, insulin-dependent diabetes, asthma, chronic intermittent left upper arm numbness presenting with chief complaint of left-sided chest pressure and tightness x10 days.    In the ED, hypertensive at 149/92 mmHg, all other vitals wnl (HR 92, RR 18, T 98F, SpO2 96%). Lab workup significant for elevated serum creatinine of 1.42 and hyperglycemia of 260. Troponins negative x2. RVP negative. ECG with normal sinus rhythm. CXR within normal limits. Patient admitted for ACS rule out.    After admission, Cardiology consulted. TTE within normal, lipid panel normal, TSH normal. A1c elevated to 8.9%, curbside consult endocrinology patient to follow up with outpatient endocrinologist at [facility]. Nuclear stress test negative.    Patient hemodynamically stable and ready for discharge.

## 2025-08-23 ENCOUNTER — EMERGENCY (EMERGENCY)
Facility: HOSPITAL | Age: 60
LOS: 1 days | End: 2025-08-23
Attending: STUDENT IN AN ORGANIZED HEALTH CARE EDUCATION/TRAINING PROGRAM
Payer: COMMERCIAL

## 2025-08-23 VITALS
TEMPERATURE: 98 F | DIASTOLIC BLOOD PRESSURE: 85 MMHG | OXYGEN SATURATION: 99 % | HEIGHT: 64 IN | HEART RATE: 75 BPM | SYSTOLIC BLOOD PRESSURE: 144 MMHG | WEIGHT: 138.89 LBS | RESPIRATION RATE: 18 BRPM

## 2025-08-23 VITALS
SYSTOLIC BLOOD PRESSURE: 142 MMHG | RESPIRATION RATE: 18 BRPM | TEMPERATURE: 98 F | DIASTOLIC BLOOD PRESSURE: 85 MMHG | OXYGEN SATURATION: 97 % | HEART RATE: 77 BPM

## 2025-08-23 LAB
ALBUMIN SERPL ELPH-MCNC: 3.4 G/DL — LOW (ref 3.5–5)
ALP SERPL-CCNC: 108 U/L — SIGNIFICANT CHANGE UP (ref 40–120)
ALT FLD-CCNC: 20 U/L DA — SIGNIFICANT CHANGE UP (ref 10–60)
ANION GAP SERPL CALC-SCNC: 5 MMOL/L — SIGNIFICANT CHANGE UP (ref 5–17)
ANISOCYTOSIS BLD QL: SIGNIFICANT CHANGE UP
AST SERPL-CCNC: 11 U/L — SIGNIFICANT CHANGE UP (ref 10–40)
BASOPHILS # BLD AUTO: 0.03 K/UL — SIGNIFICANT CHANGE UP (ref 0–0.2)
BASOPHILS NFR BLD AUTO: 0.4 % — SIGNIFICANT CHANGE UP (ref 0–2)
BILIRUB SERPL-MCNC: 1 MG/DL — SIGNIFICANT CHANGE UP (ref 0.2–1.2)
BUN SERPL-MCNC: 27 MG/DL — HIGH (ref 7–18)
CALCIUM SERPL-MCNC: 9 MG/DL — SIGNIFICANT CHANGE UP (ref 8.4–10.5)
CHLORIDE SERPL-SCNC: 107 MMOL/L — SIGNIFICANT CHANGE UP (ref 96–108)
CO2 SERPL-SCNC: 27 MMOL/L — SIGNIFICANT CHANGE UP (ref 22–31)
CREAT SERPL-MCNC: 1.5 MG/DL — HIGH (ref 0.5–1.3)
EGFR: 40 ML/MIN/1.73M2 — LOW
EGFR: 40 ML/MIN/1.73M2 — LOW
EOSINOPHIL # BLD AUTO: 0.11 K/UL — SIGNIFICANT CHANGE UP (ref 0–0.5)
EOSINOPHIL NFR BLD AUTO: 1.4 % — SIGNIFICANT CHANGE UP (ref 0–6)
GLUCOSE SERPL-MCNC: 130 MG/DL — HIGH (ref 70–99)
HCT VFR BLD CALC: 36.8 % — SIGNIFICANT CHANGE UP (ref 34.5–45)
HGB BLD-MCNC: 11.4 G/DL — LOW (ref 11.5–15.5)
HYPOCHROMIA BLD QL: SLIGHT — SIGNIFICANT CHANGE UP
IMM GRANULOCYTES NFR BLD AUTO: 0.3 % — SIGNIFICANT CHANGE UP (ref 0–0.9)
LYMPHOCYTES # BLD AUTO: 1.68 K/UL — SIGNIFICANT CHANGE UP (ref 1–3.3)
LYMPHOCYTES # BLD AUTO: 21.2 % — SIGNIFICANT CHANGE UP (ref 13–44)
MAGNESIUM SERPL-MCNC: 2.2 MG/DL — SIGNIFICANT CHANGE UP (ref 1.6–2.6)
MANUAL SMEAR VERIFICATION: SIGNIFICANT CHANGE UP
MCHC RBC-ENTMCNC: 18.4 PG — LOW (ref 27–34)
MCHC RBC-ENTMCNC: 31 G/DL — LOW (ref 32–36)
MCV RBC AUTO: 59.4 FL — LOW (ref 80–100)
MICROCYTES BLD QL: SLIGHT — SIGNIFICANT CHANGE UP
MONOCYTES # BLD AUTO: 0.49 K/UL — SIGNIFICANT CHANGE UP (ref 0–0.9)
MONOCYTES NFR BLD AUTO: 6.2 % — SIGNIFICANT CHANGE UP (ref 2–14)
NEUTROPHILS # BLD AUTO: 5.61 K/UL — SIGNIFICANT CHANGE UP (ref 1.8–7.4)
NEUTROPHILS NFR BLD AUTO: 70.5 % — SIGNIFICANT CHANGE UP (ref 43–77)
NRBC BLD AUTO-RTO: 0 /100 WBCS — SIGNIFICANT CHANGE UP (ref 0–0)
OVALOCYTES BLD QL SMEAR: SLIGHT — SIGNIFICANT CHANGE UP
PHOSPHATE SERPL-MCNC: 3.8 MG/DL — SIGNIFICANT CHANGE UP (ref 2.5–4.5)
PLAT MORPH BLD: NORMAL — SIGNIFICANT CHANGE UP
PLATELET # BLD AUTO: 252 K/UL — SIGNIFICANT CHANGE UP (ref 150–400)
POIKILOCYTOSIS BLD QL AUTO: SIGNIFICANT CHANGE UP
POTASSIUM SERPL-MCNC: 3.8 MMOL/L — SIGNIFICANT CHANGE UP (ref 3.5–5.3)
POTASSIUM SERPL-SCNC: 3.8 MMOL/L — SIGNIFICANT CHANGE UP (ref 3.5–5.3)
PROT SERPL-MCNC: 7.7 G/DL — SIGNIFICANT CHANGE UP (ref 6–8.3)
RBC # BLD: 6.2 M/UL — HIGH (ref 3.8–5.2)
RBC # FLD: 19 % — HIGH (ref 10.3–14.5)
RBC BLD AUTO: ABNORMAL
SCHISTOCYTES BLD QL AUTO: SLIGHT — SIGNIFICANT CHANGE UP
SODIUM SERPL-SCNC: 139 MMOL/L — SIGNIFICANT CHANGE UP (ref 135–145)
WBC # BLD: 7.94 K/UL — SIGNIFICANT CHANGE UP (ref 3.8–10.5)
WBC # FLD AUTO: 7.94 K/UL — SIGNIFICANT CHANGE UP (ref 3.8–10.5)

## 2025-08-23 PROCEDURE — 36415 COLL VENOUS BLD VENIPUNCTURE: CPT

## 2025-08-23 PROCEDURE — 99284 EMERGENCY DEPT VISIT MOD MDM: CPT | Mod: 25

## 2025-08-23 PROCEDURE — 93005 ELECTROCARDIOGRAM TRACING: CPT

## 2025-08-23 PROCEDURE — 96374 THER/PROPH/DIAG INJ IV PUSH: CPT

## 2025-08-23 PROCEDURE — 80053 COMPREHEN METABOLIC PANEL: CPT

## 2025-08-23 PROCEDURE — 96375 TX/PRO/DX INJ NEW DRUG ADDON: CPT

## 2025-08-23 PROCEDURE — 99285 EMERGENCY DEPT VISIT HI MDM: CPT

## 2025-08-23 PROCEDURE — 70450 CT HEAD/BRAIN W/O DYE: CPT

## 2025-08-23 PROCEDURE — 82962 GLUCOSE BLOOD TEST: CPT

## 2025-08-23 PROCEDURE — 83735 ASSAY OF MAGNESIUM: CPT

## 2025-08-23 PROCEDURE — 70450 CT HEAD/BRAIN W/O DYE: CPT | Mod: 26

## 2025-08-23 PROCEDURE — 84100 ASSAY OF PHOSPHORUS: CPT

## 2025-08-23 PROCEDURE — 85025 COMPLETE CBC W/AUTO DIFF WBC: CPT

## 2025-08-23 RX ORDER — KETOROLAC TROMETHAMINE 30 MG/ML
15 INJECTION, SOLUTION INTRAMUSCULAR; INTRAVENOUS ONCE
Refills: 0 | Status: DISCONTINUED | OUTPATIENT
Start: 2025-08-23 | End: 2025-08-23

## 2025-08-23 RX ORDER — ACETAMINOPHEN 500 MG/5ML
1000 LIQUID (ML) ORAL ONCE
Refills: 0 | Status: COMPLETED | OUTPATIENT
Start: 2025-08-23 | End: 2025-08-23

## 2025-08-23 RX ORDER — METOCLOPRAMIDE HCL 10 MG
10 TABLET ORAL ONCE
Refills: 0 | Status: COMPLETED | OUTPATIENT
Start: 2025-08-23 | End: 2025-08-23

## 2025-08-23 RX ADMIN — KETOROLAC TROMETHAMINE 15 MILLIGRAM(S): 30 INJECTION, SOLUTION INTRAMUSCULAR; INTRAVENOUS at 10:25

## 2025-08-23 RX ADMIN — Medication 400 MILLIGRAM(S): at 10:23

## 2025-08-23 RX ADMIN — Medication 1000 MILLILITER(S): at 10:23

## 2025-08-23 RX ADMIN — Medication 10 MILLIGRAM(S): at 10:24
